# Patient Record
Sex: MALE | Race: WHITE | NOT HISPANIC OR LATINO | URBAN - METROPOLITAN AREA
[De-identification: names, ages, dates, MRNs, and addresses within clinical notes are randomized per-mention and may not be internally consistent; named-entity substitution may affect disease eponyms.]

---

## 2017-02-11 ENCOUNTER — INPATIENT (INPATIENT)
Facility: HOSPITAL | Age: 62
LOS: 3 days | Discharge: HOME | End: 2017-02-15
Attending: INTERNAL MEDICINE

## 2017-03-28 ENCOUNTER — OUTPATIENT (OUTPATIENT)
Dept: OUTPATIENT SERVICES | Facility: HOSPITAL | Age: 62
LOS: 1 days | Discharge: HOME | End: 2017-03-28

## 2017-03-28 ENCOUNTER — APPOINTMENT (OUTPATIENT)
Dept: PULMONOLOGY | Facility: CLINIC | Age: 62
End: 2017-03-28

## 2017-03-28 VITALS — DIASTOLIC BLOOD PRESSURE: 70 MMHG | OXYGEN SATURATION: 94 % | HEART RATE: 100 BPM | SYSTOLIC BLOOD PRESSURE: 140 MMHG

## 2017-03-28 DIAGNOSIS — I10 ESSENTIAL (PRIMARY) HYPERTENSION: ICD-10-CM

## 2017-03-28 DIAGNOSIS — E78.5 HYPERLIPIDEMIA, UNSPECIFIED: ICD-10-CM

## 2017-03-28 DIAGNOSIS — Z86.39 PERSONAL HISTORY OF OTHER ENDOCRINE, NUTRITIONAL AND METABOLIC DISEASE: ICD-10-CM

## 2017-03-28 DIAGNOSIS — I25.10 ATHEROSCLEROTIC HEART DISEASE OF NATIVE CORONARY ARTERY W/OUT ANGINA PECTORIS: ICD-10-CM

## 2017-03-28 DIAGNOSIS — Z95.5 ATHEROSCLEROTIC HEART DISEASE OF NATIVE CORONARY ARTERY W/OUT ANGINA PECTORIS: ICD-10-CM

## 2017-03-28 DIAGNOSIS — F17.200 NICOTINE DEPENDENCE, UNSPECIFIED, UNCOMPLICATED: ICD-10-CM

## 2017-06-27 ENCOUNTER — APPOINTMENT (OUTPATIENT)
Dept: PULMONOLOGY | Facility: CLINIC | Age: 62
End: 2017-06-27

## 2017-06-27 ENCOUNTER — OUTPATIENT (OUTPATIENT)
Dept: OUTPATIENT SERVICES | Facility: HOSPITAL | Age: 62
LOS: 1 days | Discharge: HOME | End: 2017-06-27

## 2017-06-27 VITALS
HEART RATE: 95 BPM | DIASTOLIC BLOOD PRESSURE: 70 MMHG | HEIGHT: 70 IN | BODY MASS INDEX: 26.48 KG/M2 | WEIGHT: 185 LBS | SYSTOLIC BLOOD PRESSURE: 138 MMHG | OXYGEN SATURATION: 96 %

## 2017-06-27 DIAGNOSIS — J18.9 PNEUMONIA, UNSPECIFIED ORGANISM: ICD-10-CM

## 2017-06-27 DIAGNOSIS — R91.8 OTHER NONSPECIFIC ABNORMAL FINDING OF LUNG FIELD: ICD-10-CM

## 2017-06-27 DIAGNOSIS — F17.200 NICOTINE DEPENDENCE, UNSPECIFIED, UNCOMPLICATED: ICD-10-CM

## 2017-06-27 DIAGNOSIS — Z80.1 FAMILY HISTORY OF MALIGNANT NEOPLASM OF TRACHEA, BRONCHUS AND LUNG: ICD-10-CM

## 2017-06-27 DIAGNOSIS — R93.8 ABNORMAL FINDINGS ON DIAGNOSTIC IMAGING OF OTHER SPECIFIED BODY STRUCTURES: ICD-10-CM

## 2017-06-27 DIAGNOSIS — R74.8 ABNORMAL LEVELS OF OTHER SERUM ENZYMES: ICD-10-CM

## 2017-06-27 DIAGNOSIS — Z87.898 PERSONAL HISTORY OF OTHER SPECIFIED CONDITIONS: ICD-10-CM

## 2017-11-24 ENCOUNTER — OUTPATIENT (OUTPATIENT)
Dept: OUTPATIENT SERVICES | Facility: HOSPITAL | Age: 62
LOS: 1 days | Discharge: HOME | End: 2017-11-24

## 2017-11-24 DIAGNOSIS — R74.8 ABNORMAL LEVELS OF OTHER SERUM ENZYMES: ICD-10-CM

## 2017-11-24 DIAGNOSIS — J18.9 PNEUMONIA, UNSPECIFIED ORGANISM: ICD-10-CM

## 2017-11-24 DIAGNOSIS — M54.5 LOW BACK PAIN: ICD-10-CM

## 2018-01-10 ENCOUNTER — INPATIENT (INPATIENT)
Facility: HOSPITAL | Age: 63
LOS: 0 days | Discharge: HOME | End: 2018-01-11
Attending: INTERNAL MEDICINE

## 2018-01-10 DIAGNOSIS — J18.9 PNEUMONIA, UNSPECIFIED ORGANISM: ICD-10-CM

## 2018-01-10 DIAGNOSIS — J11.1 INFLUENZA DUE TO UNIDENTIFIED INFLUENZA VIRUS WITH OTHER RESPIRATORY MANIFESTATIONS: ICD-10-CM

## 2018-01-10 DIAGNOSIS — R74.8 ABNORMAL LEVELS OF OTHER SERUM ENZYMES: ICD-10-CM

## 2018-01-16 DIAGNOSIS — R07.9 CHEST PAIN, UNSPECIFIED: ICD-10-CM

## 2018-01-16 DIAGNOSIS — D64.9 ANEMIA, UNSPECIFIED: ICD-10-CM

## 2018-01-16 DIAGNOSIS — E87.1 HYPO-OSMOLALITY AND HYPONATREMIA: ICD-10-CM

## 2018-01-16 DIAGNOSIS — I25.2 OLD MYOCARDIAL INFARCTION: ICD-10-CM

## 2018-01-16 DIAGNOSIS — M51.36 OTHER INTERVERTEBRAL DISC DEGENERATION, LUMBAR REGION: ICD-10-CM

## 2018-01-16 DIAGNOSIS — E11.9 TYPE 2 DIABETES MELLITUS WITHOUT COMPLICATIONS: ICD-10-CM

## 2018-01-16 DIAGNOSIS — J44.1 CHRONIC OBSTRUCTIVE PULMONARY DISEASE WITH (ACUTE) EXACERBATION: ICD-10-CM

## 2018-01-16 DIAGNOSIS — E78.5 HYPERLIPIDEMIA, UNSPECIFIED: ICD-10-CM

## 2018-01-16 DIAGNOSIS — J44.0 CHRONIC OBSTRUCTIVE PULMONARY DISEASE WITH (ACUTE) LOWER RESPIRATORY INFECTION: ICD-10-CM

## 2018-01-16 DIAGNOSIS — J09.X1 INFLUENZA DUE TO IDENTIFIED NOVEL INFLUENZA A VIRUS WITH PNEUMONIA: ICD-10-CM

## 2018-01-16 DIAGNOSIS — Z95.5 PRESENCE OF CORONARY ANGIOPLASTY IMPLANT AND GRAFT: ICD-10-CM

## 2018-01-16 DIAGNOSIS — I10 ESSENTIAL (PRIMARY) HYPERTENSION: ICD-10-CM

## 2018-01-16 DIAGNOSIS — E78.00 PURE HYPERCHOLESTEROLEMIA, UNSPECIFIED: ICD-10-CM

## 2018-01-16 DIAGNOSIS — E87.3 ALKALOSIS: ICD-10-CM

## 2018-01-16 DIAGNOSIS — F17.210 NICOTINE DEPENDENCE, CIGARETTES, UNCOMPLICATED: ICD-10-CM

## 2018-01-23 ENCOUNTER — APPOINTMENT (OUTPATIENT)
Dept: PULMONOLOGY | Facility: CLINIC | Age: 63
End: 2018-01-23

## 2018-03-06 DIAGNOSIS — R91.8 OTHER NONSPECIFIC ABNORMAL FINDING OF LUNG FIELD: ICD-10-CM

## 2018-03-06 DIAGNOSIS — E11.9 TYPE 2 DIABETES MELLITUS WITHOUT COMPLICATIONS: ICD-10-CM

## 2018-03-06 DIAGNOSIS — J18.9 PNEUMONIA, UNSPECIFIED ORGANISM: ICD-10-CM

## 2018-03-06 DIAGNOSIS — Z91.81 HISTORY OF FALLING: ICD-10-CM

## 2018-03-06 DIAGNOSIS — I25.2 OLD MYOCARDIAL INFARCTION: ICD-10-CM

## 2018-03-06 DIAGNOSIS — A41.9 SEPSIS, UNSPECIFIED ORGANISM: ICD-10-CM

## 2018-03-06 DIAGNOSIS — F17.210 NICOTINE DEPENDENCE, CIGARETTES, UNCOMPLICATED: ICD-10-CM

## 2018-03-06 DIAGNOSIS — I10 ESSENTIAL (PRIMARY) HYPERTENSION: ICD-10-CM

## 2018-03-06 DIAGNOSIS — Z95.5 PRESENCE OF CORONARY ANGIOPLASTY IMPLANT AND GRAFT: ICD-10-CM

## 2018-03-06 DIAGNOSIS — R63.4 ABNORMAL WEIGHT LOSS: ICD-10-CM

## 2018-03-06 DIAGNOSIS — R07.81 PLEURODYNIA: ICD-10-CM

## 2018-03-06 DIAGNOSIS — I25.10 ATHEROSCLEROTIC HEART DISEASE OF NATIVE CORONARY ARTERY WITHOUT ANGINA PECTORIS: ICD-10-CM

## 2018-03-06 DIAGNOSIS — J44.1 CHRONIC OBSTRUCTIVE PULMONARY DISEASE WITH (ACUTE) EXACERBATION: ICD-10-CM

## 2018-03-06 DIAGNOSIS — E87.1 HYPO-OSMOLALITY AND HYPONATREMIA: ICD-10-CM

## 2018-03-12 ENCOUNTER — OUTPATIENT (OUTPATIENT)
Dept: OUTPATIENT SERVICES | Facility: HOSPITAL | Age: 63
LOS: 1 days | Discharge: HOME | End: 2018-03-12

## 2018-03-13 DIAGNOSIS — I10 ESSENTIAL (PRIMARY) HYPERTENSION: ICD-10-CM

## 2018-03-13 DIAGNOSIS — E11.9 TYPE 2 DIABETES MELLITUS WITHOUT COMPLICATIONS: ICD-10-CM

## 2018-03-13 DIAGNOSIS — J44.9 CHRONIC OBSTRUCTIVE PULMONARY DISEASE, UNSPECIFIED: ICD-10-CM

## 2019-10-22 ENCOUNTER — INPATIENT (INPATIENT)
Facility: HOSPITAL | Age: 64
LOS: 6 days | Discharge: HOME HEALTH | End: 2019-10-29
Attending: SURGERY | Admitting: SURGERY
Payer: MEDICARE

## 2019-10-22 VITALS
OXYGEN SATURATION: 97 % | HEART RATE: 95 BPM | RESPIRATION RATE: 18 BRPM | DIASTOLIC BLOOD PRESSURE: 76 MMHG | TEMPERATURE: 96 F | SYSTOLIC BLOOD PRESSURE: 138 MMHG

## 2019-10-22 DIAGNOSIS — Z95.5 PRESENCE OF CORONARY ANGIOPLASTY IMPLANT AND GRAFT: Chronic | ICD-10-CM

## 2019-10-22 LAB
ALBUMIN SERPL ELPH-MCNC: 4.5 G/DL — SIGNIFICANT CHANGE UP (ref 3.5–5.2)
ALP SERPL-CCNC: 85 U/L — SIGNIFICANT CHANGE UP (ref 30–115)
ALT FLD-CCNC: 36 U/L — SIGNIFICANT CHANGE UP (ref 0–41)
ANION GAP SERPL CALC-SCNC: 13 MMOL/L — SIGNIFICANT CHANGE UP (ref 7–14)
APTT BLD: 30.9 SEC — SIGNIFICANT CHANGE UP (ref 27–39.2)
APTT BLD: 92.4 SEC — CRITICAL HIGH (ref 27–39.2)
APTT BLD: 95.9 SEC — CRITICAL HIGH (ref 27–39.2)
AST SERPL-CCNC: 28 U/L — SIGNIFICANT CHANGE UP (ref 0–41)
BASOPHILS # BLD AUTO: 0.09 K/UL — SIGNIFICANT CHANGE UP (ref 0–0.2)
BASOPHILS NFR BLD AUTO: 0.9 % — SIGNIFICANT CHANGE UP (ref 0–1)
BILIRUB SERPL-MCNC: 0.8 MG/DL — SIGNIFICANT CHANGE UP (ref 0.2–1.2)
BUN SERPL-MCNC: 15 MG/DL — SIGNIFICANT CHANGE UP (ref 10–20)
CALCIUM SERPL-MCNC: 9.6 MG/DL — SIGNIFICANT CHANGE UP (ref 8.5–10.1)
CHLORIDE SERPL-SCNC: 94 MMOL/L — LOW (ref 98–110)
CO2 SERPL-SCNC: 29 MMOL/L — SIGNIFICANT CHANGE UP (ref 17–32)
CREAT SERPL-MCNC: 0.7 MG/DL — SIGNIFICANT CHANGE UP (ref 0.7–1.5)
EOSINOPHIL # BLD AUTO: 0.05 K/UL — SIGNIFICANT CHANGE UP (ref 0–0.7)
EOSINOPHIL NFR BLD AUTO: 0.5 % — SIGNIFICANT CHANGE UP (ref 0–8)
GLUCOSE BLDC GLUCOMTR-MCNC: 235 MG/DL — HIGH (ref 70–99)
GLUCOSE BLDC GLUCOMTR-MCNC: 284 MG/DL — HIGH (ref 70–99)
GLUCOSE SERPL-MCNC: 266 MG/DL — HIGH (ref 70–99)
HCT VFR BLD CALC: 41.6 % — LOW (ref 42–52)
HGB BLD-MCNC: 13 G/DL — LOW (ref 14–18)
IMM GRANULOCYTES NFR BLD AUTO: 0.2 % — SIGNIFICANT CHANGE UP (ref 0.1–0.3)
INR BLD: 1.12 RATIO — SIGNIFICANT CHANGE UP (ref 0.65–1.3)
LACTATE SERPL-SCNC: 2.1 MMOL/L — SIGNIFICANT CHANGE UP (ref 0.5–2.2)
LYMPHOCYTES # BLD AUTO: 2.13 K/UL — SIGNIFICANT CHANGE UP (ref 1.2–3.4)
LYMPHOCYTES # BLD AUTO: 22.1 % — SIGNIFICANT CHANGE UP (ref 20.5–51.1)
MCHC RBC-ENTMCNC: 19.9 PG — LOW (ref 27–31)
MCHC RBC-ENTMCNC: 31.3 G/DL — LOW (ref 32–37)
MCV RBC AUTO: 63.7 FL — LOW (ref 80–94)
MONOCYTES # BLD AUTO: 1.13 K/UL — HIGH (ref 0.1–0.6)
MONOCYTES NFR BLD AUTO: 11.7 % — HIGH (ref 1.7–9.3)
NEUTROPHILS # BLD AUTO: 6.22 K/UL — SIGNIFICANT CHANGE UP (ref 1.4–6.5)
NEUTROPHILS NFR BLD AUTO: 64.6 % — SIGNIFICANT CHANGE UP (ref 42.2–75.2)
NRBC # BLD: 0 /100 WBCS — SIGNIFICANT CHANGE UP (ref 0–0)
PLATELET # BLD AUTO: 180 K/UL — SIGNIFICANT CHANGE UP (ref 130–400)
POTASSIUM SERPL-MCNC: 4 MMOL/L — SIGNIFICANT CHANGE UP (ref 3.5–5)
POTASSIUM SERPL-SCNC: 4 MMOL/L — SIGNIFICANT CHANGE UP (ref 3.5–5)
PROT SERPL-MCNC: 7.1 G/DL — SIGNIFICANT CHANGE UP (ref 6–8)
PROTHROM AB SERPL-ACNC: 12.9 SEC — HIGH (ref 9.95–12.87)
RBC # BLD: 6.53 M/UL — HIGH (ref 4.7–6.1)
RBC # FLD: 15.1 % — HIGH (ref 11.5–14.5)
SODIUM SERPL-SCNC: 136 MMOL/L — SIGNIFICANT CHANGE UP (ref 135–146)
WBC # BLD: 9.64 K/UL — SIGNIFICANT CHANGE UP (ref 4.8–10.8)
WBC # FLD AUTO: 9.64 K/UL — SIGNIFICANT CHANGE UP (ref 4.8–10.8)

## 2019-10-22 PROCEDURE — 93971 EXTREMITY STUDY: CPT | Mod: 26,LT

## 2019-10-22 PROCEDURE — 93880 EXTRACRANIAL BILAT STUDY: CPT | Mod: 26

## 2019-10-22 PROCEDURE — 93306 TTE W/DOPPLER COMPLETE: CPT | Mod: 26

## 2019-10-22 PROCEDURE — 93926 LOWER EXTREMITY STUDY: CPT | Mod: 26,LT

## 2019-10-22 PROCEDURE — 75635 CT ANGIO ABDOMINAL ARTERIES: CPT | Mod: 26

## 2019-10-22 PROCEDURE — 99285 EMERGENCY DEPT VISIT HI MDM: CPT

## 2019-10-22 PROCEDURE — 93010 ELECTROCARDIOGRAM REPORT: CPT

## 2019-10-22 PROCEDURE — 99222 1ST HOSP IP/OBS MODERATE 55: CPT

## 2019-10-22 RX ORDER — HEPARIN SODIUM 5000 [USP'U]/ML
INJECTION INTRAVENOUS; SUBCUTANEOUS
Qty: 25000 | Refills: 0 | Status: DISCONTINUED | OUTPATIENT
Start: 2019-10-22 | End: 2019-10-22

## 2019-10-22 RX ORDER — DEXTROSE 50 % IN WATER 50 %
25 SYRINGE (ML) INTRAVENOUS ONCE
Refills: 0 | Status: DISCONTINUED | OUTPATIENT
Start: 2019-10-22 | End: 2019-10-25

## 2019-10-22 RX ORDER — HEPARIN SODIUM 5000 [USP'U]/ML
INJECTION INTRAVENOUS; SUBCUTANEOUS
Qty: 25000 | Refills: 0 | Status: DISCONTINUED | OUTPATIENT
Start: 2019-10-22 | End: 2019-10-23

## 2019-10-22 RX ORDER — HEPARIN SODIUM 5000 [USP'U]/ML
3000 INJECTION INTRAVENOUS; SUBCUTANEOUS EVERY 6 HOURS
Refills: 0 | Status: DISCONTINUED | OUTPATIENT
Start: 2019-10-22 | End: 2019-10-25

## 2019-10-22 RX ORDER — ATORVASTATIN CALCIUM 80 MG/1
80 TABLET, FILM COATED ORAL AT BEDTIME
Refills: 0 | Status: DISCONTINUED | OUTPATIENT
Start: 2019-10-22 | End: 2019-10-25

## 2019-10-22 RX ORDER — GLUCAGON INJECTION, SOLUTION 0.5 MG/.1ML
1 INJECTION, SOLUTION SUBCUTANEOUS ONCE
Refills: 0 | Status: DISCONTINUED | OUTPATIENT
Start: 2019-10-22 | End: 2019-10-25

## 2019-10-22 RX ORDER — ASPIRIN/CALCIUM CARB/MAGNESIUM 324 MG
81 TABLET ORAL DAILY
Refills: 0 | Status: DISCONTINUED | OUTPATIENT
Start: 2019-10-22 | End: 2019-10-25

## 2019-10-22 RX ORDER — HEPARIN SODIUM 5000 [USP'U]/ML
6500 INJECTION INTRAVENOUS; SUBCUTANEOUS EVERY 6 HOURS
Refills: 0 | Status: DISCONTINUED | OUTPATIENT
Start: 2019-10-22 | End: 2019-10-25

## 2019-10-22 RX ORDER — METOPROLOL TARTRATE 50 MG
50 TABLET ORAL
Refills: 0 | Status: DISCONTINUED | OUTPATIENT
Start: 2019-10-22 | End: 2019-10-25

## 2019-10-22 RX ORDER — DEXTROSE 50 % IN WATER 50 %
12.5 SYRINGE (ML) INTRAVENOUS ONCE
Refills: 0 | Status: DISCONTINUED | OUTPATIENT
Start: 2019-10-22 | End: 2019-10-25

## 2019-10-22 RX ORDER — CHLORHEXIDINE GLUCONATE 213 G/1000ML
1 SOLUTION TOPICAL
Refills: 0 | Status: DISCONTINUED | OUTPATIENT
Start: 2019-10-22 | End: 2019-10-25

## 2019-10-22 RX ORDER — HEPARIN SODIUM 5000 [USP'U]/ML
6500 INJECTION INTRAVENOUS; SUBCUTANEOUS ONCE
Refills: 0 | Status: COMPLETED | OUTPATIENT
Start: 2019-10-22 | End: 2019-10-22

## 2019-10-22 RX ORDER — INSULIN LISPRO 100/ML
6 VIAL (ML) SUBCUTANEOUS
Refills: 0 | Status: DISCONTINUED | OUTPATIENT
Start: 2019-10-22 | End: 2019-10-25

## 2019-10-22 RX ORDER — LISINOPRIL 2.5 MG/1
20 TABLET ORAL DAILY
Refills: 0 | Status: DISCONTINUED | OUTPATIENT
Start: 2019-10-22 | End: 2019-10-25

## 2019-10-22 RX ORDER — ATORVASTATIN CALCIUM 80 MG/1
20 TABLET, FILM COATED ORAL AT BEDTIME
Refills: 0 | Status: DISCONTINUED | OUTPATIENT
Start: 2019-10-22 | End: 2019-10-22

## 2019-10-22 RX ORDER — SODIUM CHLORIDE 9 MG/ML
1000 INJECTION, SOLUTION INTRAVENOUS
Refills: 0 | Status: DISCONTINUED | OUTPATIENT
Start: 2019-10-22 | End: 2019-10-25

## 2019-10-22 RX ORDER — INSULIN LISPRO 100/ML
VIAL (ML) SUBCUTANEOUS
Refills: 0 | Status: DISCONTINUED | OUTPATIENT
Start: 2019-10-22 | End: 2019-10-25

## 2019-10-22 RX ORDER — DEXTROSE 50 % IN WATER 50 %
15 SYRINGE (ML) INTRAVENOUS ONCE
Refills: 0 | Status: DISCONTINUED | OUTPATIENT
Start: 2019-10-22 | End: 2019-10-25

## 2019-10-22 RX ORDER — INSULIN GLARGINE 100 [IU]/ML
18 INJECTION, SOLUTION SUBCUTANEOUS AT BEDTIME
Refills: 0 | Status: DISCONTINUED | OUTPATIENT
Start: 2019-10-22 | End: 2019-10-25

## 2019-10-22 RX ADMIN — HEPARIN SODIUM 1500 UNIT(S)/HR: 5000 INJECTION INTRAVENOUS; SUBCUTANEOUS at 12:15

## 2019-10-22 RX ADMIN — HEPARIN SODIUM 6500 UNIT(S): 5000 INJECTION INTRAVENOUS; SUBCUTANEOUS at 12:10

## 2019-10-22 RX ADMIN — ATORVASTATIN CALCIUM 80 MILLIGRAM(S): 80 TABLET, FILM COATED ORAL at 21:39

## 2019-10-22 RX ADMIN — INSULIN GLARGINE 18 UNIT(S): 100 INJECTION, SOLUTION SUBCUTANEOUS at 21:40

## 2019-10-22 NOTE — ED PROVIDER NOTE - NS ED ROS FT
Constitutional: See HPI.  Eyes: No visual changes, eye pain or discharge.  ENMT: No hearing changes, pain, discharge or infections. No neck pain or stiffness.  Cardiac: No chest pain, SOB or edema. No chest pain with exertion.  Respiratory: No cough or respiratory distress.   GI: No nausea, vomiting, diarrhea or abdominal pain.  : No dysuria, frequency or burning.  MS: +left foot numbness and pain; No myalgia, muscle weakness, joint pain or back pain.  Neuro: No headache or weakness. No LOC.  Skin: No skin rash.  Endo: + hx of DM, No thyroid disease  Except as documented in HPI, all other review of systems is negative

## 2019-10-22 NOTE — CONSULT NOTE ADULT - ASSESSMENT
Left lower extremity arterial insufficiency.        Duplex demonstrates occlusion of left superficial femoral artery with diminished flow distally and high grade stenosis of left profunda femoris.     CAD - hx of stents without recurrence of angina.  Pt has been non compliant with medicaitons such as ASA, statins.     Hx of DM,     severe COPD.    - He is on heparin drip   - Carotid duplex     -If pt requires urgent or emergent vascular procedure (endovascular or surgical) for limb salvage may proceed without further cardiac testing at present time and is at elevated risk.  Would benefit from ASA, Statins.   If surgery is elective will consider further testing.  Echo for LV and valve function.  EKG pre- and post procedure.

## 2019-10-22 NOTE — ED PROVIDER NOTE - OBJECTIVE STATEMENT
63 y/o male with pmhx of CAD s/p 2 stents, DM, DLD, HTN presents with left foot numbness. Patient states he has been having these symptoms over the past 4 days, admits to left toes numbness with pain to plantar surface when walking and erythema over toes. Patient denies headache, vision changes, dizziness, sob, chest pain, n/v/d, abdominal pain, weakness, inability to ambulate. + smoker. Admits to having appt with podiatrist on 10/24.

## 2019-10-22 NOTE — CONSULT NOTE ADULT - ASSESSMENT
65 y/o male with pmhx of CAD s/p 2 stents, DM, DLD, HTN presents with worsening left foot numbness and discolouration for the past 3-4 days. He has not had these symptoms before. Pain is intermittent, described as sharp and located on the lateral plantar surface. It is associated with a discolouration of the digits, numbness, and tingling. On exam, left lower extremity pulses are dopplerable but not palpable. Right sided pulses are palpable.     Plan:   - discuss with attending 63 y/o male with pmhx of CAD s/p 2 stents, DM, DLD, HTN presents with worsening left foot numbness and discolouration for the past 3-4 days. He has not had these symptoms before. Pain is intermittent, described as sharp and located on the lateral plantar surface. It is associated with a discolouration of the digits, numbness, and tingling. On exam, left lower extremity pulses are dopplerable but not palpable. Right sided pulses are palpable. Duplex demonstrates occlusion of left superficial femoral artery with diminished flow distally and high grade stenosis of left profunda femoris.     Plan:   - CTA abdomen pelvis with lower extremity run off   - heparin drip   - Carotid duplex   - cardiology   - recommend admission to medicine 63 y/o male with pmhx of CAD s/p 2 stents, DM, DLD, HTN presents with worsening left foot numbness, discolouration, and pain for the past 3-4 days. He has not had these symptoms before. Pain is intermittent, described as sharp and located on the lateral plantar surface. It is associated with a discolouration of the digits, numbness, and tingling. On exam, left lower extremity pulses are dopplerable but not palpable. Right sided pulses are palpable. Duplex demonstrates occlusion of left superficial femoral artery with diminished flow distally and high grade stenosis of left profunda femoris.     Plan:   - CTA abdomen pelvis with lower extremity run off   - heparin drip   - Carotid duplex   - cardiology   - recommend admission to medicine

## 2019-10-22 NOTE — H&P ADULT - NSHPLABSRESULTS_GEN_ALL_CORE
< from: [facility] Duplex Lower Ext Vein Scan, Left (10.22.19 @ 09:10) >    Impression:    No evidence of deep venous thrombosis or superficial thrombophlebitis in   left lower extremity.      < end of copied text >      < from: [facility] Duplex Low Ext Arterial, Ltd, Left (10.22.19 @ 09:08) >    Impression:    Acute occlusion of left superficial femoral artery with severely   diminished flow distally. High-grade stenosis left profunda femoris   artery.      < end of copied text >

## 2019-10-22 NOTE — H&P ADULT - HISTORY OF PRESENT ILLNESS
63 y/o male with pmhx of CAD s/p 2 stents, DM, DLD, HTN presents with worsening left foot numbness, discolouration, and pain for the past 3-4 days. He has not had these symptoms before. Pain is intermittent, described as sharp and located on the lateral plantar surface. It is associated with a discolouration of the digits, numbness, and tingling. The pain is exacerbated by walking and nothing relives it. There is no radiation of pain/numbess. No pain in hip, thighs, or calfs with walking. Currently the patient has no chest pain, shortness of breath, nausea, vomiting, change in bowel habit, or abdominal pain. He has not seen a vascular surgeon in the past. Patient is a current smoker with a 30 pack year history. No family history of thrombosis. 65 y/o male with pmhx of CAD s/p PCI 10yrs ago, DM, DLD, HTN presents with worsening left foot numbness, reddish discoloration, coldness and pain for the past 2 weeks which has been worsening in the last 4days. Pain is intermittent, described as sharp and located on the lateral plantar surface, exacerbated by walking and nothing relieves it. There is no radiation of pain, weakness in lower extremities. No pain in hip, thighs, or calves with walking. No c/o chest pain, shortness of breath, nausea, vomiting, change in bowel habit, or abdominal pain.  No similar complaints in the past. He has not seen a vascular surgeon in the past. Patient is a current smoker with a 40 pack year history.   In ED, /76mm Hg, HR 95, Afebrile, Saturating at 97% on room air. Vascular arterial duplex showed Acute occlusion of left superficial femoral artery with severely diminished flow distally. High-grade stenosis left profunda femoris artery.

## 2019-10-22 NOTE — CONSULT NOTE ADULT - SUBJECTIVE AND OBJECTIVE BOX
Date of Admission:    Evaluated by Dr. Torsten Reyes MD contact no. [phone]    CHIEF COMPLAINT:  Left lower extremity numbness and cold.    HISTORY OF PRESENT ILLNESS:     63 y/o male with hx of CAD s/p PCI 10yrs ago, DM, DLD, HTN presents with worsening left foot numbness, reddish discoloration, coldness and pain for the past 2 weeks which has been worsening in the last 4days. Pain is intermittent, described as sharp and located on the lateral plantar surface, exacerbated by walking and nothing relieves it. There is no radiation of pain, weakness in lower extremities. No pain in hip, thighs, or calves with walking. No c/o chest pain, shortness of breath, nausea, vomiting, change in bowel habit, or abdominal pain.  No similar complaints in the past. He has not seen a vascular surgeon in the past. Patient is a current smoker with a 40 pack year history.   In ED, /76mm Hg, HR 95, Afebrile, Saturating at 97% on room air. Vascular arterial duplex showed Acute occlusion of left superficial femoral artery with severely diminished flow distally. High-grade stenosis left profunda femoris artery. (22 Oct 2019 13:36)  He stopped all cardiac medications because of side effects.  He does not take ASA, STatins.      PAST MEDICAL & SURGICAL HISTORY:  CAD (coronary artery disease)  Dyslipidemia  Diabetes  HTN (hypertension)  History of coronary artery stent placement: x 2 stents      FAMILY HISTORY:    Allergies    No Known Allergies    Intolerances    	  Home Medications:  atorvastatin 20 mg oral tablet: 1 tab(s) orally once a day (22 Oct 2019 14:06)  lisinopril 20 mg oral tablet: 1 tab(s) orally once a day (22 Oct 2019 14:06)  metFORMIN 850 mg oral tablet: orally every 12 hours (22 Oct 2019 14:06)  metoprolol: 50 milligram(s) orally 2 times a day (22 Oct 2019 14:06)    MEDICATIONS  (STANDING):  aspirin enteric coated 81 milliGRAM(s) Oral daily  atorvastatin 80 milliGRAM(s) Oral at bedtime  chlorhexidine 4% Liquid 1 Application(s) Topical <User Schedule>  dextrose 5%. 1000 milliLiter(s) (50 mL/Hr) IV Continuous <Continuous>  dextrose 50% Injectable 12.5 Gram(s) IV Push once  dextrose 50% Injectable 25 Gram(s) IV Push once  dextrose 50% Injectable 25 Gram(s) IV Push once  heparin  Infusion.  Unit(s)/Hr (15 mL/Hr) IV Continuous <Continuous>  insulin glargine Injectable (LANTUS) 18 Unit(s) SubCutaneous at bedtime  insulin lispro (HumaLOG) corrective regimen sliding scale   SubCutaneous three times a day before meals  insulin lispro Injectable (HumaLOG) 6 Unit(s) SubCutaneous three times a day before meals  lisinopril 20 milliGRAM(s) Oral daily  metoprolol tartrate 50 milliGRAM(s) Oral two times a day    MEDICATIONS  (PRN):  dextrose 40% Gel 15 Gram(s) Oral once PRN Blood Glucose LESS THAN 70 milliGRAM(s)/deciliter  glucagon  Injectable 1 milliGRAM(s) IntraMuscular once PRN Glucose LESS THAN 70 milligrams/deciliter  heparin  Injectable 6500 Unit(s) IV Push every 6 hours PRN For aPTT less than 40  heparin  Injectable 3000 Unit(s) IV Push every 6 hours PRN For aPTT between 40 - 57      SOCIAL HISTORY:      [x] current Smoker  [ ] Alcohol    REVIEW OF SYSTEMS:  CONSTITUTIONAL: No fever, chills, or fatigue  CARDIOLOGY: Patient denies chest pain, shortness of breath, palpitations or syncopal episodes.   RESPIRATORY: denies shortness of breath, wheezing.   NEUROLOGICAL: Generalized weakness, no focal deficits to report.  ENDOCRINOLOGICAL: no recent change in diabetic medications.   GI: no melena/hematochezia, no Nausea, Vomiting, diarrhea.    PSYCHIATRY: normal mood and affect  HEENT: no epistaxis, headaches.  SKIN: no ecchymosis, no lesions.  MUSCULOSKELETAL: he does not walk much due to back pain.    PHYSICAL EXAM:  T(C): 36.4 (10-22-19 @ 15:51), Max: 36.4 (10-22-19 @ 15:51)  HR: 90 (10-22-19 @ 15:51) (83 - 95)  BP: 133/76 (10-22-19 @ 15:51) (132/74 - 138/76)  RR: 18 (10-22-19 @ 15:51) (16 - 18)  SpO2: 97% (10-22-19 @ 15:51) (97% - 98%)  Wt(kg): --  I&O's Summary    Daily Height in cm: 177.8 (22 Oct 2019 10:29)    Daily     General Appearance: Well developed, M in no distress	  Cardiovascular:  S1 S2 soft intensity, No JVD, No murmurs or gallops.  No peripheral edema  Respiratory: Lungs with severely diminished air entry, diffuse wheezing.  No rales.  Psychiatry: A & O x 3, Mood & affect appropriate  Gastrointestinal:  Soft, Non-tender, no bruits.  Skin: No rashes, No ecchymoses, No cyanosis	  Neurologic: No focal motor deficits.  Extremities: No clubbing, cyanosis or edema  Left foot cool but not cold.    Vascular: Peripheral pulses palpable 2+ on right.  Absent pulses popliteal, DP, PT    LABS:	 	                          13.0   9.64  )-----------( 180      ( 22 Oct 2019 07:52 )             41.6     10-22    136  |  94<L>  |  15  ----------------------------<  266<H>  4.0   |  29  |  0.7    Ca    9.6      22 Oct 2019 07:52    TPro  7.1  /  Alb  4.5  /  TBili  0.8  /  DBili  x   /  AST  28  /  ALT  36  /  AlkPhos  85  10-22        PT/INR - ( 22 Oct 2019 07:52 )   PT: 12.90 sec;   INR: 1.12 ratio         PTT - ( 22 Oct 2019 16:13 )  PTT:95.9 sec     	    PREVIOUS DIAGNOSTIC TESTING:    [ ] Echocardiogram:  [ ]  Catheterization:  [ ] Stress Test:  	  	  ASSESSMENT/PLAN: Date of Admission:    Evaluated by Dr. Torsten Reyes MD contact no. [phone]    CHIEF COMPLAINT:  Left lower extremity numbness and cold.    HISTORY OF PRESENT ILLNESS:     63 y/o male with hx of CAD s/p PCI 10yrs ago, DM, DLD, HTN presents with worsening left foot numbness, reddish discoloration, coldness and pain for the past 2 weeks which has been worsening in the last 4days. Pain is intermittent, described as sharp and located on the lateral plantar surface, exacerbated by walking and nothing relieves it. There is no radiation of pain, weakness in lower extremities. No pain in hip, thighs, or calves with walking. No c/o chest pain, shortness of breath, nausea, vomiting, change in bowel habit, or abdominal pain.  No similar complaints in the past. He has not seen a vascular surgeon in the past. Patient is a current smoker with a 40 pack year history.   In ED, /76mm Hg, HR 95, Afebrile, Saturating at 97% on room air. Vascular arterial duplex showed Acute occlusion of left superficial femoral artery with severely diminished flow distally. High-grade stenosis left profunda femoris artery. (22 Oct 2019 13:36)  He stopped all cardiac medications because of side effects.  He does not take ASA, STatins.      PAST MEDICAL & SURGICAL HISTORY:  CAD (coronary artery disease)  Dyslipidemia  Diabetes  HTN (hypertension)  History of coronary artery stent placement: x 2 stents      FAMILY HISTORY:    Allergies    No Known Allergies    Intolerances    	  Home Medications:  atorvastatin 20 mg oral tablet: 1 tab(s) orally once a day (22 Oct 2019 14:06)  lisinopril 20 mg oral tablet: 1 tab(s) orally once a day (22 Oct 2019 14:06)  metFORMIN 850 mg oral tablet: orally every 12 hours (22 Oct 2019 14:06)  metoprolol: 50 milligram(s) orally 2 times a day (22 Oct 2019 14:06)    MEDICATIONS  (STANDING):  aspirin enteric coated 81 milliGRAM(s) Oral daily  atorvastatin 80 milliGRAM(s) Oral at bedtime  chlorhexidine 4% Liquid 1 Application(s) Topical <User Schedule>  dextrose 5%. 1000 milliLiter(s) (50 mL/Hr) IV Continuous <Continuous>  dextrose 50% Injectable 12.5 Gram(s) IV Push once  dextrose 50% Injectable 25 Gram(s) IV Push once  dextrose 50% Injectable 25 Gram(s) IV Push once  heparin  Infusion.  Unit(s)/Hr (15 mL/Hr) IV Continuous <Continuous>  insulin glargine Injectable (LANTUS) 18 Unit(s) SubCutaneous at bedtime  insulin lispro (HumaLOG) corrective regimen sliding scale   SubCutaneous three times a day before meals  insulin lispro Injectable (HumaLOG) 6 Unit(s) SubCutaneous three times a day before meals  lisinopril 20 milliGRAM(s) Oral daily  metoprolol tartrate 50 milliGRAM(s) Oral two times a day    MEDICATIONS  (PRN):  dextrose 40% Gel 15 Gram(s) Oral once PRN Blood Glucose LESS THAN 70 milliGRAM(s)/deciliter  glucagon  Injectable 1 milliGRAM(s) IntraMuscular once PRN Glucose LESS THAN 70 milligrams/deciliter  heparin  Injectable 6500 Unit(s) IV Push every 6 hours PRN For aPTT less than 40  heparin  Injectable 3000 Unit(s) IV Push every 6 hours PRN For aPTT between 40 - 57      SOCIAL HISTORY:      [x] current Smoker  [ ] Alcohol    REVIEW OF SYSTEMS:  CONSTITUTIONAL: No fever, chills, or fatigue  CARDIOLOGY: Patient denies chest pain, shortness of breath, palpitations or syncopal episodes.   RESPIRATORY: denies shortness of breath, wheezing.   NEUROLOGICAL: Generalized weakness, no focal deficits to report.  ENDOCRINOLOGICAL: no recent change in diabetic medications.   GI: no melena/hematochezia, no Nausea, Vomiting, diarrhea.    PSYCHIATRY: normal mood and affect  HEENT: no epistaxis, headaches.  SKIN: no ecchymosis, no lesions.  MUSCULOSKELETAL: he does not walk much due to back pain.    PHYSICAL EXAM:  T(C): 36.4 (10-22-19 @ 15:51), Max: 36.4 (10-22-19 @ 15:51)  HR: 90 (10-22-19 @ 15:51) (83 - 95)  BP: 133/76 (10-22-19 @ 15:51) (132/74 - 138/76)  RR: 18 (10-22-19 @ 15:51) (16 - 18)  SpO2: 97% (10-22-19 @ 15:51) (97% - 98%)  Wt(kg): --  I&O's Summary    Daily Height in cm: 177.8 (22 Oct 2019 10:29)    Daily     General Appearance: Well developed, M in no distress	  Cardiovascular:  S1 S2 soft intensity, No JVD, No murmurs or gallops.  No peripheral edema  Respiratory: Lungs with severely diminished air entry, diffuse wheezing.  No rales.  Psychiatry: A & O x 3, Mood & affect appropriate  Gastrointestinal:  Soft, Non-tender, no bruits.  Skin: No rashes, No ecchymoses, No cyanosis	  Neurologic: No focal motor deficits.  Extremities: No clubbing, cyanosis or edema  Left foot cool but not cold.    Vascular: Peripheral pulses palpable 2+ on right.  Absent pulses popliteal, DP, PT    LABS:	 	                          13.0   9.64  )-----------( 180      ( 22 Oct 2019 07:52 )             41.6     10-22    136  |  94<L>  |  15  ----------------------------<  266<H>  4.0   |  29  |  0.7    Ca    9.6      22 Oct 2019 07:52    TPro  7.1  /  Alb  4.5  /  TBili  0.8  /  DBili  x   /  AST  28  /  ALT  36  /  AlkPhos  85  10-22        PT/INR - ( 22 Oct 2019 07:52 )   PT: 12.90 sec;   INR: 1.12 ratio         PTT - ( 22 Oct 2019 16:13 )  PTT:95.9 sec     	    PREVIOUS DIAGNOSTIC TESTING:    [x ] Echocardiogram: normal LV systolic function, LVH with diastolic dysfunction. No sig valve lesions.  [ ]  Catheterization:  [ ] Stress Test:  	  	  ASSESSMENT/PLAN:

## 2019-10-22 NOTE — CONSULT NOTE ADULT - ATTENDING COMMENTS
64 year old with CAD and 3 days of left foot pain and numbness    Pt denies prior history of PVD. There are no ulcerations. On exam he has doppler signals only on the affected limb. Dupex shows proximal SFA and profunda occlusion with collateral flow. This is suggestive of embolic vs thrombotic event. Will obtain CTA for eval.

## 2019-10-22 NOTE — ED ADULT NURSE NOTE - CHPI ED NUR SYMPTOMS NEG
no nausea/no weakness/no change in level of consciousness/no vomiting/no fever/no dizziness/no loss of consciousness/no blurred vision/no confusion

## 2019-10-22 NOTE — ED PROVIDER NOTE - PHYSICAL EXAMINATION
CONSTITUTIONAL: Well-developed; well-nourished; in no acute distress.   SKIN: warm, dry  HEAD: Normocephalic; atraumatic.  EYES: no conj injection  ENT: No nasal discharge; airway clear.  NECK: Supple; non tender.  CARD: S1, S2 normal; no murmurs, gallops, or rubs. Regular rate and rhythm.   RESP: No wheezes, rales or rhonchi.  ABD: soft ntnd  EXT: +left calf tenderness, no swelling or erythema; Normal ROM.  No clubbing, cyanosis or edema.   NEURO: Alert, oriented, grossly unremarkable; 5/5 motor strength left foot; + decreased sensation over dorsal aspect of left 2nd-5th toes; + dopplerable DP and PT left foot; +erythema over left toes; gait normal  PSYCH: Cooperative, appropriate.

## 2019-10-22 NOTE — ED PROVIDER NOTE - CLINICAL SUMMARY MEDICAL DECISION MAKING FREE TEXT BOX
Labs ok, duplex with acute L femoral arterial thrombus. Vascular consulted, rec CTA, carotid duplex, heparin, admit.

## 2019-10-22 NOTE — H&P ADULT - ASSESSMENT
65 y/o male with pmhx of CAD s/p PCI 10yrs ago, DM, DLD, HTN presents with worsening left foot numbness, reddish discoloration, coldness and pain for the past 2 weeks which has been worsening in the last 4days. In ED, vascular arterial duplex showed acute occlusion of left superficial femoral artery with severely diminished flow distally. High-grade stenosis left profunda femoris artery.     ASSESSMENT AND PLAN:    #Peripheral vascular disease Embolic vs thrombotic  -Presented with left lower extremity numbness, coldness   -Pulses not palpable on right side, but present on doppler at bed side as per vascular  -Arterial duplex showed Occlusion of left SFA and profunda femoris on left side with collaterals  -Started the patient on heparin drip. f/u PTT 4pm, 8pm, am. Goal PTT 60-90  -Vascular following  -Ordered CT angio abdomen & pelvis with lower extremity run off  -Occlusion can be embolic vs thrombotic from atherosclerosis considering the history of CAD, chronic smoking  -Carotid duplex, ECHO w/ bubble study ordered  -Started on aspirin and high dose statin    #Diabetes Mellitus  -Started on insulin glargine and lispro  -Monitor FS and adjust the dose accordingly    #Hypertension  -c/w metoprolol and lisinopril    #DLD  -c/w statin    #Diet: NPO currently for possible OR after CT angio  #DVT ppx: heparin gtt  #GI ppx: not indicated  #Dispo: from home. Acute

## 2019-10-22 NOTE — CONSULT NOTE ADULT - SUBJECTIVE AND OBJECTIVE BOX
PORFIRIO HERRERA [id number]  64y Male      HPI:  65 y/o male with pmhx of CAD s/p 2 stents, DM, DLD, HTN presents with worsening left foot numbness and discolouration for the past 3-4 days. He has not had these symptoms before. Pain is intermittent, described as sharp and located on the lateral plantar surface. It is associated with a discolouration of the digits, numbness, and tingling. The pain is exacerbated by walking and nothing relives it. There is no radiation of pain/numbess. Currently the patient has no chest pain, shortenss of breath, nausea, vomiting, change in bowel habit, or abdominal pain. He has not seen a vascular surgeon in the past. Patient is a current smoker with a 30 pack year history.     PAST MEDICAL & SURGICAL HISTORY:  CAD (coronary artery disease)  Dyslipidemia  Diabetes  HTN (hypertension)  History of coronary artery stent placement: x 2 stents        MEDICATIONS  (STANDING):    MEDICATIONS  (PRN):      Allergies    No Known Allergies    Intolerances        REVIEW OF SYSTEMS    [x A ten-point review of systems was otherwise negative except as noted.  [ ] Due to altered mental status/intubation, subjective information were not able to be obtained from the patient. History was obtained, to the extent possible, from review of the chart and collateral sources of information.      Vital Signs Last 24 Hrs  T(C): 35.8 (22 Oct 2019 07:25), Max: 35.8 (22 Oct 2019 07:25)  T(F): 96.4 (22 Oct 2019 07:25), Max: 96.4 (22 Oct 2019 07:25)  HR: 95 (22 Oct 2019 07:25) (95 - 95)  BP: 138/76 (22 Oct 2019 07:25) (138/76 - 138/76)  BP(mean): --  RR: 18 (22 Oct 2019 07:25) (18 - 18)  SpO2: 97% (22 Oct 2019 07:25) (97% - 97%)    PHYSICAL EXAM:  GENERAL: NAD, well-appearing  CHEST/LUNG: Clear to auscultation bilaterally  HEART: Regular rate and rhythm  ABDOMEN: Soft, Nontender, Nondistended;   EXTREMITIES: Left foot erythematous, b/l feet are cool but equal in temperature, good range of motion b/l, sensation is reduced on left  Pulses:   DP - right palpable, left dopplerable   PT - right palpable, left dopplerable   Popliteal: dopplerable b/l     LABS:  Labs:  CAPILLARY BLOOD GLUCOSE                              13.0   9.64  )-----------( 180      ( 22 Oct 2019 07:52 )             41.6       Auto Neutrophil %: 64.6 % (10-22-19 @ 07:52)  Auto Immature Granulocyte %: 0.2 % (10-22-19 @ 07:52)    10-22    136  |  94<L>  |  15  ----------------------------<  266<H>  4.0   |  29  |  0.7      Calcium, Total Serum: 9.6 mg/dL (10-22-19 @ 07:52)      LFTs:             7.1  | 0.8  | 28       ------------------[85      ( 22 Oct 2019 07:52 )  4.5  | x    | 36          Lipase:x      Amylase:x         Lactate, Blood: 2.1 mmol/L (10-22-19 @ 07:52)      Coags:     12.90  ----< 1.12    ( 22 Oct 2019 07:52 )     30.9          RADIOLOGY & ADDITIONAL STUDIES:  f/u read of duplex scans PORFIRIO HERRERA [id number]  64y Male      HPI:  65 y/o male with pmhx of CAD s/p 2 stents, DM, DLD, HTN presents with worsening left foot numbness and discolouration for the past 3-4 days. He has not had these symptoms before. Pain is intermittent, described as sharp and located on the lateral plantar surface. It is associated with a discolouration of the digits, numbness, and tingling. The pain is exacerbated by walking and nothing relives it. There is no radiation of pain/numbess. No pain in hip, thighs, or calfs with walking. Currently the patient has no chest pain, shortness of breath, nausea, vomiting, change in bowel habit, or abdominal pain. He has not seen a vascular surgeon in the past. Patient is a current smoker with a 30 pack year history. No family history of thrombosis.     PAST MEDICAL & SURGICAL HISTORY:  CAD (coronary artery disease)  Dyslipidemia  Diabetes  HTN (hypertension)  History of coronary artery stent placement: x 2 stents        MEDICATIONS  (STANDING):    MEDICATIONS  (PRN):      Allergies    No Known Allergies    Intolerances        REVIEW OF SYSTEMS    [x A ten-point review of systems was otherwise negative except as noted.  [ ] Due to altered mental status/intubation, subjective information were not able to be obtained from the patient. History was obtained, to the extent possible, from review of the chart and collateral sources of information.      Vital Signs Last 24 Hrs  T(C): 35.8 (22 Oct 2019 07:25), Max: 35.8 (22 Oct 2019 07:25)  T(F): 96.4 (22 Oct 2019 07:25), Max: 96.4 (22 Oct 2019 07:25)  HR: 95 (22 Oct 2019 07:25) (95 - 95)  BP: 138/76 (22 Oct 2019 07:25) (138/76 - 138/76)  BP(mean): --  RR: 18 (22 Oct 2019 07:25) (18 - 18)  SpO2: 97% (22 Oct 2019 07:25) (97% - 97%)    PHYSICAL EXAM:  GENERAL: NAD, well-appearing  CHEST/LUNG: Clear to auscultation bilaterally  HEART: Regular rate and rhythm  ABDOMEN: Soft, Nontender, Nondistended;   EXTREMITIES: Left foot erythematous, b/l feet are cool but L is cooler than right in temperature, good range of motion b/l, sensation is reduced on left, inability to feel on left plantar aspect of foot     Pulses:   DP - right palpable, left dopplerable   PT - right palpable, left dopplerable   Popliteal: dopplerable b/l     LABS:  Labs:  CAPILLARY BLOOD GLUCOSE                              13.0   9.64  )-----------( 180      ( 22 Oct 2019 07:52 )             41.6       Auto Neutrophil %: 64.6 % (10-22-19 @ 07:52)  Auto Immature Granulocyte %: 0.2 % (10-22-19 @ 07:52)    10-22    136  |  94<L>  |  15  ----------------------------<  266<H>  4.0   |  29  |  0.7      Calcium, Total Serum: 9.6 mg/dL (10-22-19 @ 07:52)      LFTs:             7.1  | 0.8  | 28       ------------------[85      ( 22 Oct 2019 07:52 )  4.5  | x    | 36          Lipase:x      Amylase:x         Lactate, Blood: 2.1 mmol/L (10-22-19 @ 07:52)      Coags:     12.90  ----< 1.12    ( 22 Oct 2019 07:52 )     30.9          RADIOLOGY & ADDITIONAL STUDIES:    Acute occlusion of left superficial femoral artery with severely diminished   flow distally. High-grade stenosis left profunda femoris artery. PORFIRIO HERRERA [id number]  64y Male      HPI:  65 y/o male with pmhx of CAD s/p 2 stents, DM, DLD, HTN presents with worsening left foot numbness, discolouration, and pain for the past 3-4 days. He has not had these symptoms before. Pain is intermittent, described as sharp and located on the lateral plantar surface. It is associated with a discolouration of the digits, numbness, and tingling. The pain is exacerbated by walking and nothing relives it. There is no radiation of pain/numbess. No pain in hip, thighs, or calfs with walking. Currently the patient has no chest pain, shortness of breath, nausea, vomiting, change in bowel habit, or abdominal pain. He has not seen a vascular surgeon in the past. Patient is a current smoker with a 30 pack year history. No family history of thrombosis.     PAST MEDICAL & SURGICAL HISTORY:  CAD (coronary artery disease)  Dyslipidemia  Diabetes  HTN (hypertension)  History of coronary artery stent placement: x 2 stents        MEDICATIONS  (STANDING):    MEDICATIONS  (PRN):      Allergies    No Known Allergies    Intolerances        REVIEW OF SYSTEMS    [x A ten-point review of systems was otherwise negative except as noted.  [ ] Due to altered mental status/intubation, subjective information were not able to be obtained from the patient. History was obtained, to the extent possible, from review of the chart and collateral sources of information.      Vital Signs Last 24 Hrs  T(C): 35.8 (22 Oct 2019 07:25), Max: 35.8 (22 Oct 2019 07:25)  T(F): 96.4 (22 Oct 2019 07:25), Max: 96.4 (22 Oct 2019 07:25)  HR: 95 (22 Oct 2019 07:25) (95 - 95)  BP: 138/76 (22 Oct 2019 07:25) (138/76 - 138/76)  BP(mean): --  RR: 18 (22 Oct 2019 07:25) (18 - 18)  SpO2: 97% (22 Oct 2019 07:25) (97% - 97%)    PHYSICAL EXAM:  GENERAL: NAD, well-appearing  CHEST/LUNG: Clear to auscultation bilaterally  HEART: Regular rate and rhythm  ABDOMEN: Soft, Nontender, Nondistended;   EXTREMITIES: Left foot erythematous, b/l feet are cool but L is cooler than right in temperature, good range of motion b/l, sensation is reduced on left, inability to feel on left plantar aspect of foot     Pulses:   DP - right palpable, left dopplerable   PT - right palpable, left dopplerable   Popliteal: dopplerable b/l     LABS:  Labs:  CAPILLARY BLOOD GLUCOSE                              13.0   9.64  )-----------( 180      ( 22 Oct 2019 07:52 )             41.6       Auto Neutrophil %: 64.6 % (10-22-19 @ 07:52)  Auto Immature Granulocyte %: 0.2 % (10-22-19 @ 07:52)    10-22    136  |  94<L>  |  15  ----------------------------<  266<H>  4.0   |  29  |  0.7      Calcium, Total Serum: 9.6 mg/dL (10-22-19 @ 07:52)      LFTs:             7.1  | 0.8  | 28       ------------------[85      ( 22 Oct 2019 07:52 )  4.5  | x    | 36          Lipase:x      Amylase:x         Lactate, Blood: 2.1 mmol/L (10-22-19 @ 07:52)      Coags:     12.90  ----< 1.12    ( 22 Oct 2019 07:52 )     30.9          RADIOLOGY & ADDITIONAL STUDIES:    Acute occlusion of left superficial femoral artery with severely diminished   flow distally. High-grade stenosis left profunda femoris artery.

## 2019-10-22 NOTE — ED ADULT NURSE NOTE - PMH
Diabetes    HTN (hypertension) CAD (coronary artery disease)    Diabetes    Dyslipidemia    HTN (hypertension)

## 2019-10-22 NOTE — H&P ADULT - NSICDXPASTMEDICALHX_GEN_ALL_CORE_FT
PAST MEDICAL HISTORY:  CAD (coronary artery disease)     Diabetes     Dyslipidemia     HTN (hypertension)

## 2019-10-22 NOTE — ED PROVIDER NOTE - ATTENDING CONTRIBUTION TO CARE
63yo man h/o CAD s/p 2 stents, not on ASA currently c/o numbness and pain to his L foot x 4 days, progressively worse. Denies prior episodes, chest pain, HA, SOB, nausea, vomiting. On exam he is very well appearing, lungs CTA, CVS1S2 RRR abd soft, NT. LLE hyperemic at the distal toes, decreased pulse (but dopplerable) on the L foot; will do labs and send for arterial duplex to assess for arterial occlusion.

## 2019-10-23 LAB
ALBUMIN SERPL ELPH-MCNC: 4.3 G/DL — SIGNIFICANT CHANGE UP (ref 3.5–5.2)
ALP SERPL-CCNC: 69 U/L — SIGNIFICANT CHANGE UP (ref 30–115)
ALT FLD-CCNC: 35 U/L — SIGNIFICANT CHANGE UP (ref 0–41)
ANION GAP SERPL CALC-SCNC: 12 MMOL/L — SIGNIFICANT CHANGE UP (ref 7–14)
APTT BLD: 101.5 SEC — CRITICAL HIGH (ref 27–39.2)
APTT BLD: 111.3 SEC — CRITICAL HIGH (ref 27–39.2)
APTT BLD: 74.2 SEC — CRITICAL HIGH (ref 27–39.2)
APTT BLD: 77.8 SEC — CRITICAL HIGH (ref 27–39.2)
AST SERPL-CCNC: 27 U/L — SIGNIFICANT CHANGE UP (ref 0–41)
BILIRUB SERPL-MCNC: 0.8 MG/DL — SIGNIFICANT CHANGE UP (ref 0.2–1.2)
BUN SERPL-MCNC: 13 MG/DL — SIGNIFICANT CHANGE UP (ref 10–20)
CALCIUM SERPL-MCNC: 9.3 MG/DL — SIGNIFICANT CHANGE UP (ref 8.5–10.1)
CHLORIDE SERPL-SCNC: 99 MMOL/L — SIGNIFICANT CHANGE UP (ref 98–110)
CHOLEST SERPL-MCNC: 129 MG/DL — SIGNIFICANT CHANGE UP (ref 100–200)
CO2 SERPL-SCNC: 30 MMOL/L — SIGNIFICANT CHANGE UP (ref 17–32)
CREAT SERPL-MCNC: 0.7 MG/DL — SIGNIFICANT CHANGE UP (ref 0.7–1.5)
ESTIMATED AVERAGE GLUCOSE: 200 MG/DL — HIGH (ref 68–114)
GLUCOSE BLDC GLUCOMTR-MCNC: 100 MG/DL — HIGH (ref 70–99)
GLUCOSE BLDC GLUCOMTR-MCNC: 165 MG/DL — HIGH (ref 70–99)
GLUCOSE BLDC GLUCOMTR-MCNC: 231 MG/DL — HIGH (ref 70–99)
GLUCOSE BLDC GLUCOMTR-MCNC: 314 MG/DL — HIGH (ref 70–99)
GLUCOSE SERPL-MCNC: 160 MG/DL — HIGH (ref 70–99)
HBA1C BLD-MCNC: 8.6 % — HIGH (ref 4–5.6)
HCT VFR BLD CALC: 41.6 % — LOW (ref 42–52)
HCV AB S/CO SERPL IA: 0.17 S/CO — SIGNIFICANT CHANGE UP (ref 0–0.99)
HCV AB SERPL-IMP: SIGNIFICANT CHANGE UP
HDLC SERPL-MCNC: 37 MG/DL — LOW
HGB BLD-MCNC: 13 G/DL — LOW (ref 14–18)
LIPID PNL WITH DIRECT LDL SERPL: 82 MG/DL — SIGNIFICANT CHANGE UP (ref 4–129)
MAGNESIUM SERPL-MCNC: 1.9 MG/DL — SIGNIFICANT CHANGE UP (ref 1.8–2.4)
MCHC RBC-ENTMCNC: 20 PG — LOW (ref 27–31)
MCHC RBC-ENTMCNC: 31.3 G/DL — LOW (ref 32–37)
MCV RBC AUTO: 64 FL — LOW (ref 80–94)
NRBC # BLD: 0 /100 WBCS — SIGNIFICANT CHANGE UP (ref 0–0)
PLATELET # BLD AUTO: 154 K/UL — SIGNIFICANT CHANGE UP (ref 130–400)
POTASSIUM SERPL-MCNC: 4.2 MMOL/L — SIGNIFICANT CHANGE UP (ref 3.5–5)
POTASSIUM SERPL-SCNC: 4.2 MMOL/L — SIGNIFICANT CHANGE UP (ref 3.5–5)
PROT SERPL-MCNC: 6.7 G/DL — SIGNIFICANT CHANGE UP (ref 6–8)
RBC # BLD: 6.5 M/UL — HIGH (ref 4.7–6.1)
RBC # FLD: 14.4 % — SIGNIFICANT CHANGE UP (ref 11.5–14.5)
SODIUM SERPL-SCNC: 141 MMOL/L — SIGNIFICANT CHANGE UP (ref 135–146)
TOTAL CHOLESTEROL/HDL RATIO MEASUREMENT: 3.5 RATIO — LOW (ref 4–5.5)
TRIGL SERPL-MCNC: 112 MG/DL — SIGNIFICANT CHANGE UP (ref 10–149)
WBC # BLD: 6.98 K/UL — SIGNIFICANT CHANGE UP (ref 4.8–10.8)
WBC # FLD AUTO: 6.98 K/UL — SIGNIFICANT CHANGE UP (ref 4.8–10.8)

## 2019-10-23 PROCEDURE — 93010 ELECTROCARDIOGRAM REPORT: CPT

## 2019-10-23 RX ORDER — HYDROXYZINE HCL 10 MG
25 TABLET ORAL ONCE
Refills: 0 | Status: COMPLETED | OUTPATIENT
Start: 2019-10-23 | End: 2019-10-23

## 2019-10-23 RX ORDER — HEPARIN SODIUM 5000 [USP'U]/ML
1300 INJECTION INTRAVENOUS; SUBCUTANEOUS
Qty: 25000 | Refills: 0 | Status: DISCONTINUED | OUTPATIENT
Start: 2019-10-23 | End: 2019-10-25

## 2019-10-23 RX ORDER — HEPARIN SODIUM 5000 [USP'U]/ML
1400 INJECTION INTRAVENOUS; SUBCUTANEOUS
Qty: 25000 | Refills: 0 | Status: DISCONTINUED | OUTPATIENT
Start: 2019-10-23 | End: 2019-10-23

## 2019-10-23 RX ORDER — ADENOSINE 3 MG/ML
60 INJECTION INTRAVENOUS ONCE
Refills: 0 | Status: COMPLETED | OUTPATIENT
Start: 2019-10-23 | End: 2019-10-24

## 2019-10-23 RX ADMIN — HEPARIN SODIUM 13 UNIT(S)/HR: 5000 INJECTION INTRAVENOUS; SUBCUTANEOUS at 08:19

## 2019-10-23 RX ADMIN — Medication 50 MILLIGRAM(S): at 18:04

## 2019-10-23 RX ADMIN — LISINOPRIL 20 MILLIGRAM(S): 2.5 TABLET ORAL at 05:22

## 2019-10-23 RX ADMIN — Medication 50 MILLIGRAM(S): at 05:21

## 2019-10-23 RX ADMIN — ATORVASTATIN CALCIUM 80 MILLIGRAM(S): 80 TABLET, FILM COATED ORAL at 22:34

## 2019-10-23 RX ADMIN — Medication 2: at 12:07

## 2019-10-23 RX ADMIN — Medication 81 MILLIGRAM(S): at 12:07

## 2019-10-23 RX ADMIN — Medication 25 MILLIGRAM(S): at 14:56

## 2019-10-23 RX ADMIN — HEPARIN SODIUM 1400 UNIT(S)/HR: 5000 INJECTION INTRAVENOUS; SUBCUTANEOUS at 04:02

## 2019-10-23 RX ADMIN — INSULIN GLARGINE 18 UNIT(S): 100 INJECTION, SOLUTION SUBCUTANEOUS at 22:34

## 2019-10-23 RX ADMIN — HEPARIN SODIUM 1500 UNIT(S)/HR: 5000 INJECTION INTRAVENOUS; SUBCUTANEOUS at 00:00

## 2019-10-23 RX ADMIN — Medication 6 UNIT(S): at 12:07

## 2019-10-23 NOTE — PROGRESS NOTE ADULT - SUBJECTIVE AND OBJECTIVE BOX
Patient's chart reviewed and patient examined by Torsten Reyes MD (contact:[phone])    SUBJ: left foot pain is better.    Events in last 24 hours: on IV Heparin.      MEDICATIONS  (STANDING):  aDENosine Injectable (ADENOSCAN) 60 milliGRAM(s) IV Bolus once  aspirin enteric coated 81 milliGRAM(s) Oral daily  atorvastatin 80 milliGRAM(s) Oral at bedtime  chlorhexidine 4% Liquid 1 Application(s) Topical <User Schedule>  dextrose 5%. 1000 milliLiter(s) (50 mL/Hr) IV Continuous <Continuous>  dextrose 50% Injectable 12.5 Gram(s) IV Push once  dextrose 50% Injectable 25 Gram(s) IV Push once  dextrose 50% Injectable 25 Gram(s) IV Push once  heparin  Infusion 1300 Unit(s)/Hr (13 mL/Hr) IV Continuous <Continuous>  insulin glargine Injectable (LANTUS) 18 Unit(s) SubCutaneous at bedtime  insulin lispro (HumaLOG) corrective regimen sliding scale   SubCutaneous three times a day before meals  insulin lispro Injectable (HumaLOG) 6 Unit(s) SubCutaneous three times a day before meals  lisinopril 20 milliGRAM(s) Oral daily  metoprolol tartrate 50 milliGRAM(s) Oral two times a day    MEDICATIONS  (PRN):  dextrose 40% Gel 15 Gram(s) Oral once PRN Blood Glucose LESS THAN 70 milliGRAM(s)/deciliter  glucagon  Injectable 1 milliGRAM(s) IntraMuscular once PRN Glucose LESS THAN 70 milligrams/deciliter  heparin  Injectable 6500 Unit(s) IV Push every 6 hours PRN For aPTT less than 40  heparin  Injectable 3000 Unit(s) IV Push every 6 hours PRN For aPTT between 40 - 57          Vital Signs Last 24 Hrs  T(C): 36.3 (23 Oct 2019 15:32), Max: 36.3 (23 Oct 2019 00:00)  T(F): 97.4 (23 Oct 2019 15:32), Max: 97.4 (23 Oct 2019 00:00)  HR: 90 (23 Oct 2019 15:32) (66 - 90)  BP: 109/64 (23 Oct 2019 15:32) (109/64 - 154/67)  BP(mean): --  RR: 18 (23 Oct 2019 15:32) (18 - 18)  SpO2: 97% (23 Oct 2019 05:19) (97% - 97%)     REVIEW OF SYSTEMS:  CONSTITUTIONAL: No fever, chills.  CARDIOLOGY: Denies chest pain, shortness of breath or palpitations.   RESPIRATORY: denies cough, shortness of breath, wheezing.   NEUROLOGICAL: Generalized weakness, no focal deficits to report.  GI: no melena/hematochezia, denies nausea, Vomiting or diarrhea.    PSYCHIATRY: normal mood and affect    PHYSICAL EXAM:  · CONSTITUTIONAL:	Well-developed,  M in no distress    ·RESPIRATORY:   breath sounds severely diminished;  diffuse wheezing  · CARDIOVASCULAR	S1 and S2soft intensity, no rub, gallop or murmur,    ABDOMEN: soft, non-tender, NABS  · EXTREMITIES: No cyanosis, clubbing or edema  NEURO: alert and oriented x 3, no focal deficits.  · VASCULAR: 	absent pulses left POp, DP, PT  	    LABS:                        13.0   6.98  )-----------( 154      ( 23 Oct 2019 05:25 )             41.6     10-23    141  |  99  |  13  ----------------------------<  160<H>  4.2   |  30  |  0.7    Ca    9.3      23 Oct 2019 05:25  Mg     1.9     10-23    TPro  6.7  /  Alb  4.3  /  TBili  0.8  /  DBili  x   /  AST  27  /  ALT  35  /  AlkPhos  69  10-23        PT/INR - ( 22 Oct 2019 07:52 )   PT: 12.90 sec;   INR: 1.12 ratio         PTT - ( 23 Oct 2019 11:33 )  PTT:77.8 sec    I&O's Summary    22 Oct 2019 07:01  -  23 Oct 2019 07:00  --------------------------------------------------------  IN: 175 mL / OUT: 0 mL / NET: 175 mL    23 Oct 2019 07:01  -  23 Oct 2019 23:06  --------------------------------------------------------  IN: 144 mL / OUT: 0 mL / NET: 144 mL      BNP  RADIOLOGY & ADDITIONAL STUDIES:    IMPRESSION AND PLAN:

## 2019-10-23 NOTE — CHART NOTE - NSCHARTNOTEFT_GEN_A_CORE
Dx: left leg rest pain, CFA/SFA occlusion with reconstitution    Plan: for left femoral to popliteal artery  bypass Friday  Please, confirm with cardiology risks assessment  echo noted  Pre-op labs, T&S, EKG, CXR tomorrow

## 2019-10-23 NOTE — PROVIDER CONTACT NOTE (OTHER) - BACKGROUND
Pt scheduled for bypass Friday, Cardiology Dr. Reyes has seen pt already and did not mention stress test to pt.

## 2019-10-24 LAB
ANION GAP SERPL CALC-SCNC: 11 MMOL/L — SIGNIFICANT CHANGE UP (ref 7–14)
APTT BLD: 65.8 SEC — HIGH (ref 27–39.2)
APTT BLD: 70.5 SEC — CRITICAL HIGH (ref 27–39.2)
BUN SERPL-MCNC: 12 MG/DL — SIGNIFICANT CHANGE UP (ref 10–20)
CALCIUM SERPL-MCNC: 9.7 MG/DL — SIGNIFICANT CHANGE UP (ref 8.5–10.1)
CHLORIDE SERPL-SCNC: 98 MMOL/L — SIGNIFICANT CHANGE UP (ref 98–110)
CO2 SERPL-SCNC: 31 MMOL/L — SIGNIFICANT CHANGE UP (ref 17–32)
CREAT SERPL-MCNC: 0.6 MG/DL — LOW (ref 0.7–1.5)
GLUCOSE BLDC GLUCOMTR-MCNC: 149 MG/DL — HIGH (ref 70–99)
GLUCOSE BLDC GLUCOMTR-MCNC: 197 MG/DL — HIGH (ref 70–99)
GLUCOSE BLDC GLUCOMTR-MCNC: 204 MG/DL — HIGH (ref 70–99)
GLUCOSE BLDC GLUCOMTR-MCNC: 213 MG/DL — HIGH (ref 70–99)
GLUCOSE BLDC GLUCOMTR-MCNC: 98 MG/DL — SIGNIFICANT CHANGE UP (ref 70–99)
GLUCOSE SERPL-MCNC: 188 MG/DL — HIGH (ref 70–99)
HCT VFR BLD CALC: 42.2 % — SIGNIFICANT CHANGE UP (ref 42–52)
HGB BLD-MCNC: 12.8 G/DL — LOW (ref 14–18)
INR BLD: 1.07 RATIO — SIGNIFICANT CHANGE UP (ref 0.65–1.3)
MCHC RBC-ENTMCNC: 19.5 PG — LOW (ref 27–31)
MCHC RBC-ENTMCNC: 30.3 G/DL — LOW (ref 32–37)
MCV RBC AUTO: 64.4 FL — LOW (ref 80–94)
NRBC # BLD: 0 /100 WBCS — SIGNIFICANT CHANGE UP (ref 0–0)
PLATELET # BLD AUTO: 175 K/UL — SIGNIFICANT CHANGE UP (ref 130–400)
POTASSIUM SERPL-MCNC: 4.1 MMOL/L — SIGNIFICANT CHANGE UP (ref 3.5–5)
POTASSIUM SERPL-SCNC: 4.1 MMOL/L — SIGNIFICANT CHANGE UP (ref 3.5–5)
PROTHROM AB SERPL-ACNC: 12.3 SEC — SIGNIFICANT CHANGE UP (ref 9.95–12.87)
RBC # BLD: 6.55 M/UL — HIGH (ref 4.7–6.1)
RBC # FLD: 15.7 % — HIGH (ref 11.5–14.5)
SODIUM SERPL-SCNC: 140 MMOL/L — SIGNIFICANT CHANGE UP (ref 135–146)
WBC # BLD: 10.31 K/UL — SIGNIFICANT CHANGE UP (ref 4.8–10.8)
WBC # FLD AUTO: 10.31 K/UL — SIGNIFICANT CHANGE UP (ref 4.8–10.8)

## 2019-10-24 PROCEDURE — 78452 HT MUSCLE IMAGE SPECT MULT: CPT | Mod: 26

## 2019-10-24 PROCEDURE — 93018 CV STRESS TEST I&R ONLY: CPT

## 2019-10-24 PROCEDURE — 93016 CV STRESS TEST SUPVJ ONLY: CPT

## 2019-10-24 PROCEDURE — 71045 X-RAY EXAM CHEST 1 VIEW: CPT | Mod: 26

## 2019-10-24 RX ORDER — SODIUM CHLORIDE 9 MG/ML
1000 INJECTION INTRAMUSCULAR; INTRAVENOUS; SUBCUTANEOUS
Refills: 0 | Status: DISCONTINUED | OUTPATIENT
Start: 2019-10-24 | End: 2019-10-25

## 2019-10-24 RX ADMIN — ADENOSINE 600 MILLIGRAM(S): 3 INJECTION INTRAVENOUS at 10:30

## 2019-10-24 RX ADMIN — Medication 81 MILLIGRAM(S): at 12:39

## 2019-10-24 RX ADMIN — HEPARIN SODIUM 13 UNIT(S)/HR: 5000 INJECTION INTRAVENOUS; SUBCUTANEOUS at 16:12

## 2019-10-24 RX ADMIN — Medication 50 MILLIGRAM(S): at 07:18

## 2019-10-24 RX ADMIN — Medication 1: at 12:40

## 2019-10-24 RX ADMIN — LISINOPRIL 20 MILLIGRAM(S): 2.5 TABLET ORAL at 07:18

## 2019-10-24 RX ADMIN — ATORVASTATIN CALCIUM 80 MILLIGRAM(S): 80 TABLET, FILM COATED ORAL at 21:48

## 2019-10-24 RX ADMIN — Medication 50 MILLIGRAM(S): at 17:23

## 2019-10-24 RX ADMIN — Medication 6 UNIT(S): at 12:40

## 2019-10-24 RX ADMIN — INSULIN GLARGINE 18 UNIT(S): 100 INJECTION, SOLUTION SUBCUTANEOUS at 21:49

## 2019-10-24 NOTE — CHART NOTE - NSCHARTNOTEFT_GEN_A_CORE
Patient: PORFIRIO HERRERA  MRN: [id number]  64y Male  Location: [facility] [id number] C  10-24-19 @ 16:08    Vitals:  T(F): 97.6 (10-24-19 @ 15:30), Max: 98 (10-24-19 @ 07:16)  HR: 86 (10-24-19 @ 15:30) (70 - 86)  BP: 120/58 (10-24-19 @ 15:30) (120/58 - 142/70)  RR: 18 (10-24-19 @ 15:30) (18 - 18)  SpO2: 94% (10-24-19 @ 07:00) (94% - 94%)    Procedure: Left femoral to popliteal bypass   Pre-Op Diagnosis: occlusion of SFA  Consent in Chart: [  ] Yes [  ] No  DIET:   Diet, DASH/TLC:   Sodium & Cholesterol Restricted  Consistent Carbohydrate {No Snacks}  Diet, NPO after Midnight:      NPO Start Date: 24-Oct-2019,   NPO Start Time: 23:59  Except Medications  With Ice Chips/Sips of Water    FLUIDS: NS at 100 when NPO       EK Lead ECG:   Ventricular Rate 78 BPM    Atrial Rate 78 BPM    P-R Interval 172 ms    QRS Duration 154 ms    Q-T Interval 428 ms    QTC Calculation(Bezet) 487 ms    P Axis 75 degrees    R Axis -78 degrees    T Axis 12 degrees    Diagnosis Line Sinus rhythm with Premature supraventricular complexes  Left axis deviation  Right bundle branch block  Minimal voltage criteria for LVH, may be normal variant  Septal infarct , age undetermined  Abnormal ECG    Confirmed by Darius Dalton ([id number]) on 10/23/2019 7:09:38 PM  (10-23-19 @ 16:04)    CXR:  Xray Chest 1 View- PORTABLE-Routine:   EXAM:  XR CHEST PORTABLE ROUTINE 1V            PROCEDURE DATE:  10/24/2019        INTERPRETATION:  Clinical History / Reason for exam: Preoperative   evaluation    Comparison : Chest radiograph from 1/10/2018.    Technique/Positioning: Single frontal view of chest.    Findings:    Support devices: None.    Cardiac/mediastinum/hilum: Unremarkable.    Lung parenchyma/Pleura: No focal consolidation, effusion or pneumothorax.    Skeleton/soft tissues: Unremarkable.    Impression:      No radiographic evidence of acute cardiopulmonary disease.        LAMBERTO DOCKERY M.D., RESIDENT RADIOLOGIST  This document has been electronically signed.  MARYANN DUMONT M.D., ATTENDING RADIOLOGIST  This document has been electronically signed. Oct 24 2019  2:22PM             (10-24-19 @ 06:43)    Pre-OP Labs:  CAPILLARY BLOOD GLUCOSE      POCT Blood Glucose.: 197 mg/dL (24 Oct 2019 12:38)  POCT Blood Glucose.: 204 mg/dL (24 Oct 2019 11:24)  POCT Blood Glucose.: 149 mg/dL (24 Oct 2019 08:19)  POCT Blood Glucose.: 314 mg/dL (23 Oct 2019 21:33)  POCT Blood Glucose.: 100 mg/dL (23 Oct 2019 16:59)                          12.8   10. )-----------( 175      ( 24 Oct 2019 12:29 )             42.2     10-24    140  |  98  |  12  ----------------------------<  188<H>  4.1   |  31  |  0.6<L>    Ca    9.7      24 Oct 2019 12:29  Mg     1.9     10-23    TPro  6.7  /  Alb  4.3  /  TBili  0.8  /  DBili  x   /  AST  27  /  ALT  35  /  AlkPhos  69  10-23    PT/INR - ( 24 Oct 2019 12:29 )   PT: 12.30 sec;   INR: 1.07 ratio         PTT - ( 24 Oct 2019 12:29 )  PTT:70.5 sec    Type & Screen: ABO RH Interpretation: O POS (10.24.19 @ 12:29)    Anticoagulation/Antiplatelet:  heparin  Infusion 1300 Unit(s)/Hr IV Continuous <Continuous>  heparin  Injectable 6500 Unit(s) IV Push every 6 hours PRN  heparin  Injectable 3000 Unit(s) IV Push every 6 hours PRN

## 2019-10-24 NOTE — PROGRESS NOTE ADULT - SUBJECTIVE AND OBJECTIVE BOX
Patient was seen and examined. Spoke with RN. Chart reviewed.    No events overnight.  Vital Signs Last 24 Hrs  T(F): 98 (24 Oct 2019 07:16), Max: 98 (24 Oct 2019 07:16)  HR: 78 (24 Oct 2019 07:16) (70 - 90)  BP: 140/66 (24 Oct 2019 07:16) (109/64 - 142/70)  SpO2: 94% (24 Oct 2019 07:00) (94% - 94%)  MEDICATIONS  (STANDING):  aDENosine Injectable (ADENOSCAN) 60 milliGRAM(s) IV Bolus once  aspirin enteric coated 81 milliGRAM(s) Oral daily  atorvastatin 80 milliGRAM(s) Oral at bedtime  chlorhexidine 4% Liquid 1 Application(s) Topical <User Schedule>  dextrose 5%. 1000 milliLiter(s) (50 mL/Hr) IV Continuous <Continuous>  dextrose 50% Injectable 12.5 Gram(s) IV Push once  dextrose 50% Injectable 25 Gram(s) IV Push once  dextrose 50% Injectable 25 Gram(s) IV Push once  heparin  Infusion 1300 Unit(s)/Hr (13 mL/Hr) IV Continuous <Continuous>  insulin glargine Injectable (LANTUS) 18 Unit(s) SubCutaneous at bedtime  insulin lispro (HumaLOG) corrective regimen sliding scale   SubCutaneous three times a day before meals  insulin lispro Injectable (HumaLOG) 6 Unit(s) SubCutaneous three times a day before meals  lisinopril 20 milliGRAM(s) Oral daily  metoprolol tartrate 50 milliGRAM(s) Oral two times a day    MEDICATIONS  (PRN):  dextrose 40% Gel 15 Gram(s) Oral once PRN Blood Glucose LESS THAN 70 milliGRAM(s)/deciliter  glucagon  Injectable 1 milliGRAM(s) IntraMuscular once PRN Glucose LESS THAN 70 milligrams/deciliter  heparin  Injectable 6500 Unit(s) IV Push every 6 hours PRN For aPTT less than 40  heparin  Injectable 3000 Unit(s) IV Push every 6 hours PRN For aPTT between 40 - 57    Labs:                        13.0   6.98  )-----------( 154      ( 23 Oct 2019 05:25 )             41.6     23 Oct 2019 05:25    141    |  99     |  13     ----------------------------<  160    4.2     |  30     |  0.7      Ca    9.3        23 Oct 2019 05:25  Mg     1.9       23 Oct 2019 05:25    TPro  6.7    /  Alb  4.3    /  TBili  0.8    /  DBili  x      /  AST  27     /  ALT  35     /  AlkPhos  69     23 Oct 2019 05:25    PT/INR - ( 24 Oct 2019 12:29 )   PT: 12.30 sec;   INR: 1.07 ratio         PTT - ( 23 Oct 2019 21:29 )  PTT:74.2 sec        Radiology:    General: comfortable, NAD  Neurology: A&Ox3, nonfocal  Head:  Normocephalic, atraumatic  ENT:  Mucosa moist, no ulcerations  Neck:  Supple, no JVD,   Skin: no breakdowns (as per RN)  Resp: CTA B/L  CV: RRR, S1S2,   GI: Soft, NT, bowel sounds  MS: decreased pulses, + peripheral pulses, FROM all 4 extremity

## 2019-10-24 NOTE — PROGRESS NOTE ADULT - SUBJECTIVE AND OBJECTIVE BOX
PORFIRIO HERRERA 64y Male  MRN#: [id number]     SUBJECTIVE  Patient is a 64y old Male who presents with a chief complaint of Left foot numbness and coldness (22 Oct 2019 21:00)  Currently admitted to medicine with the primary diagnosis of Vascular occlusion    Today is hospital day 2d, and this morning he reports numbness on his right lower extremity minimal pain, still the same as yesterday. He is anxious about OR.     OBJECTIVE  PAST MEDICAL & SURGICAL HISTORY  CAD (coronary artery disease)  Dyslipidemia  Diabetes  HTN (hypertension)  History of coronary artery stent placement: x 2 stents    ALLERGIES:  No Known Allergies    MEDICATIONS:  STANDING MEDICATIONS  aspirin enteric coated 81 milliGRAM(s) Oral daily  atorvastatin 80 milliGRAM(s) Oral at bedtime  chlorhexidine 4% Liquid 1 Application(s) Topical <User Schedule>  dextrose 5%. 1000 milliLiter(s) IV Continuous <Continuous>  dextrose 50% Injectable 12.5 Gram(s) IV Push once  dextrose 50% Injectable 25 Gram(s) IV Push once  dextrose 50% Injectable 25 Gram(s) IV Push once  heparin  Infusion. 1400 Unit(s)/Hr IV Continuous <Continuous>  insulin glargine Injectable (LANTUS) 18 Unit(s) SubCutaneous at bedtime  insulin lispro (HumaLOG) corrective regimen sliding scale   SubCutaneous three times a day before meals  insulin lispro Injectable (HumaLOG) 6 Unit(s) SubCutaneous three times a day before meals  lisinopril 20 milliGRAM(s) Oral daily  metoprolol tartrate 50 milliGRAM(s) Oral two times a day    PRN MEDICATIONS  dextrose 40% Gel 15 Gram(s) Oral once PRN  glucagon  Injectable 1 milliGRAM(s) IntraMuscular once PRN  heparin  Injectable 6500 Unit(s) IV Push every 6 hours PRN  heparin  Injectable 3000 Unit(s) IV Push every 6 hours PRN    VITAL SIGNS: Last 24 Hours  Vital Signs Last 24 Hrs  T(C): 36.2 (23 Oct 2019 07:51), Max: 36.4 (22 Oct 2019 15:51)  T(F): 97.1 (23 Oct 2019 07:51), Max: 97.6 (22 Oct 2019 15:51)  HR: 66 (23 Oct 2019 07:51) (66 - 90)  BP: 131/72 (23 Oct 2019 07:51) (129/78 - 154/67)  BP(mean): --  RR: 18 (23 Oct 2019 07:51) (16 - 18)  SpO2: 97% (23 Oct 2019 05:19) (97% - 98%)  LABS:                        13.0   6.98  )-----------( 154      ( 23 Oct 2019 05:25 )             41.6     10-23    141  |  99  |  13  ----------------------------<  160<H>  4.2   |  30  |  0.7    Ca    9.3      23 Oct 2019 05:25  Mg     1.9     10-23    TPro  6.7  /  Alb  4.3  /  TBili  0.8  /  DBili  x   /  AST  27  /  ALT  35  /  AlkPhos  69  10-23    PT/INR - ( 22 Oct 2019 07:52 )   PT: 12.90 sec;   INR: 1.12 ratio      PTT - ( 23 Oct 2019 05:25 )  PTT:101.5 sec    Lactate, Blood: 2.1 mmol/L (10-22-19 @ 07:52)    RADIOLOGY:  < from: CT Angio Abd Aorta w/run-off w/ IV Cont (10.22.19 @ 15:57) >  IMPRESSION:    1.  Mild abdominal aortic atherosclerotic disease. Mild narrowing at the   celiac artery ostium, with severe narrowing of proximal splenic artery.   Bilateral renal arteries are grossly patent.    2.  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 The distal right external iliac artery, common femoral artery,   SFA/profunda femoris arteries, and popliteal artery are patent. Intact   three-vessel runoff to the right ankle and foot.    2.  Left lower extremity: Severe tandem disease of the left internal   iliac artery. The left external iliac and common femoral artery are   patent. There is complete occlusion of the left superficial femoral   artery at the level below the femoral neck, extending to the level of the   mid thigh. There is subsequent reconstitution of flow. The left popliteal   artery is patent, with intact two-vessel runoff to the left ankle and   foot. Severely diseased anterior tibialartery at the level midshin down   to the ankle, dorsalis pedis artery is patent.    4.  Thickened bilateral adrenal glands, with fatty components concerning   for bilateral adrenal adenomas. Nonurgent laboratory workup is   recommended.    PHYSICAL EXAM:    GENERAL: NAD, well-developed, AAOx3  HEENT:  Atraumatic, Normocephalic. EOMI, PERRLA, conjunctiva and sclera clear, No JVD  PULMONARY: Clear to auscultation bilaterally; No wheeze  CARDIOVASCULAR: Regular rate and rhythm; No murmurs, rubs, or gallops  GASTROINTESTINAL: Soft, Nontender, Nondistended; Bowel sounds present  MUSCULOSKELETAL:  no pedal pulses appreciated. Left leg is cold, but no blue-arturo discoloration.   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     Vascular: complete occlusion of left superficial femoral artery (on Duplex and CT angio)  - Patient also has severe PVD on several other vessels.   - being seen by vascular surgery  - will need bypass - planning for Friday  - on Heparin drip with pTT q6hrs  - on aspirin    Cardio: (1) CAD s/p PCI  - on aspirin  - Echo: good ejection fraction, no valvular abnormalities  - following with cardio to consider further pre-op eval.    (2) HTN  - lisinopril and metoprolol   - BP well controlled  (4) DLD  - on statin     Endo: DMII  - on Lantus 18U and lispro 6U    DVT prophylaxis: heparin drip  Diet: DASH and NPO on thursday after midnight  Activity: as tolerated  Dispo: acute PORFIRIO HERRERA 64y Male  MRN#: [id number]     SUBJECTIVE  Patient is a 64y old Male who presents with a chief complaint of Left foot numbness and coldness (22 Oct 2019 21:00)  Currently admitted to medicine with the primary diagnosis of Vascular occlusion    Today is hospital day 2d, and this morning he reports numbness on his right lower extremity minimal pain, still the same as yesterday. He is anxious about OR.     OBJECTIVE  PAST MEDICAL & SURGICAL HISTORY  CAD (coronary artery disease)  Dyslipidemia  Diabetes  HTN (hypertension)  History of coronary artery stent placement: x 2 stents    ALLERGIES:  No Known Allergies    MEDICATIONS:  MEDICATIONS  (STANDING):  aDENosine Injectable (ADENOSCAN) 60 milliGRAM(s) IV Bolus once  aspirin enteric coated 81 milliGRAM(s) Oral daily  atorvastatin 80 milliGRAM(s) Oral at bedtime  chlorhexidine 4% Liquid 1 Application(s) Topical <User Schedule>  dextrose 5%. 1000 milliLiter(s) (50 mL/Hr) IV Continuous <Continuous>  dextrose 50% Injectable 12.5 Gram(s) IV Push once  dextrose 50% Injectable 25 Gram(s) IV Push once  dextrose 50% Injectable 25 Gram(s) IV Push once  heparin  Infusion 1300 Unit(s)/Hr (13 mL/Hr) IV Continuous <Continuous>  insulin glargine Injectable (LANTUS) 18 Unit(s) SubCutaneous at bedtime  insulin lispro (HumaLOG) corrective regimen sliding scale   SubCutaneous three times a day before meals  insulin lispro Injectable (HumaLOG) 6 Unit(s) SubCutaneous three times a day before meals  lisinopril 20 milliGRAM(s) Oral daily  metoprolol tartrate 50 milliGRAM(s) Oral two times a day    MEDICATIONS  (PRN):  dextrose 40% Gel 15 Gram(s) Oral once PRN Blood Glucose LESS THAN 70 milliGRAM(s)/deciliter  glucagon  Injectable 1 milliGRAM(s) IntraMuscular once PRN Glucose LESS THAN 70 milligrams/deciliter  heparin  Injectable 6500 Unit(s) IV Push every 6 hours PRN For aPTT less than 40  heparin  Injectable 3000 Unit(s) IV Push every 6 hours PRN For aPTT between 40 - 57      VITAL SIGNS: Last 24 Hours  Vital Signs Last 24 Hrs  T(C): 36.7 (24 Oct 2019 07:16), Max: 36.7 (24 Oct 2019 07:16)  T(F): 98 (24 Oct 2019 07:16), Max: 98 (24 Oct 2019 07:16)  HR: 78 (24 Oct 2019 07:16) (70 - 90)  BP: 140/66 (24 Oct 2019 07:16) (109/64 - 142/70)  BP(mean): --  RR: 18 (24 Oct 2019 07:16) (18 - 18)  SpO2: 94% (24 Oct 2019 07:00) (94% - 94%)  LABS:  PENDING                 RADIOLOGY:  < from: CT Angio Abd Aorta w/run-off w/ IV Cont (10.22.19 @ 15:57) >  IMPRESSION:    1.  Mild abdominal aortic atherosclerotic disease. Mild narrowing at the   celiac artery ostium, with severe narrowing of proximal splenic artery.   Bilateral renal arteries are grossly patent.    2.  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 The distal right external iliac artery, common femoral artery,   SFA/profunda femoris arteries, and popliteal artery are patent. Intact   three-vessel runoff to the right ankle and foot.    2.  Left lower extremity: Severe tandem disease of the left internal   iliac artery. The left external iliac and common femoral artery are   patent. There is complete occlusion of the left superficial femoral   artery at the level below the femoral neck, extending to the level of the   mid thigh. There is subsequent reconstitution of flow. The left popliteal   artery is patent, with intact two-vessel runoff to the left ankle and   foot. Severely diseased anterior tibialartery at the level midshin down   to the ankle, dorsalis pedis artery is patent.    4.  Thickened bilateral adrenal glands, with fatty components concerning   for bilateral adrenal adenomas. Nonurgent laboratory workup is   recommended.    PHYSICAL EXAM:    GENERAL: NAD, well-developed, AAOx3  HEENT:  Atraumatic, Normocephalic. EOMI, PERRLA, conjunctiva and sclera clear, No JVD  PULMONARY: Clear to auscultation bilaterally; No wheeze  CARDIOVASCULAR: Regular rate and rhythm; No murmurs, rubs, or gallops  GASTROINTESTINAL: Soft, Nontender, Nondistended; Bowel sounds present  MUSCULOSKELETAL:  no pedal pulses appreciated. Left leg is cold, but no blue-arturo discoloration.   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     Vascular: complete occlusion of left superficial femoral artery (on Duplex and CT angio)  - Patient also has severe PVD on several other vessels.   - being seen by vascular surgery  - will need bypass - planning for Friday  - on Heparin drip with pTT q12 hours as patient is currently theraputic for 2 reads.   - on aspirin    Cardio: (1) CAD s/p PCI  - on aspirin  - Echo: good ejection fraction, no valvular abnormalities  - pending results from pharmacological stress test.   (2) HTN  - lisinopril and metoprolol   - BP well controlled  (4) DLD  - on statin     Endo: DMII  - on Lantus 18U and lispro 6U    DVT prophylaxis: heparin drip  Diet: DASH and NPO on thursday after midnight  Activity: as tolerated  Dispo: acute

## 2019-10-24 NOTE — CHART NOTE - NSCHARTNOTEFT_GEN_A_CORE
Informed that nuclear medicine stress test was positive.   I informed cardiology attending.   I informed vascular surgery  Cardiology state that if vascular surgery is NOT limb saving they may consider doing cardiac cath pre-vascular bypass.   If surgery IS limb saving then vascular should proceed with LE bypass surgery.

## 2019-10-24 NOTE — PROGRESS NOTE ADULT - SUBJECTIVE AND OBJECTIVE BOX
GENERAL SURGERY PROGRESS NOTE     CORIN HERRERAO  [address] [place] [address] day :2d  POD:  Procedure:   Surgical Attending: Michael Waldron  Overnight events: no acute events overnight. NPO for stress test in am.    T(F): 97.5 (10-24-19 @ 00:16), Max: 97.5 (10-24-19 @ 00:16)  HR: 70 (10-24-19 @ 00:16) (66 - 90)  BP: 138/67 (10-24-19 @ 00:16) (109/64 - 138/67)  ABP: --  ABP(mean): --  RR: 18 (10-24-19 @ 00:16) (18 - 18)  SpO2: --      10-22-19 @ 07:01  -  10-23-19 @ 07:00  --------------------------------------------------------  IN:    heparin  Infusion.: 175 mL  Total IN: 175 mL    OUT:  Total OUT: 0 mL    Total NET: 175 mL      10-23-19 @ 07:01  -  10-24-19 @ 06:08  --------------------------------------------------------  IN:    heparin  Infusion.: 14 mL    heparin Infusion: 130 mL  Total IN: 144 mL    OUT:  Total OUT: 0 mL    Total NET: 144 mL        DIET/FLUIDS: dextrose 5%. 1000 milliLiter(s) IV Continuous <Continuous>     GI proph:    AC/ proph: aspirin enteric coated 81 milliGRAM(s) Oral daily  heparin  Infusion 1300 Unit(s)/Hr IV Continuous <Continuous>  heparin  Injectable 6500 Unit(s) IV Push every 6 hours PRN  heparin  Injectable 3000 Unit(s) IV Push every 6 hours PRN    ABx:     PHYSICAL EXAM:  GENERAL: NAD, well-appearing  CHEST/LUNG: Clear to auscultation bilaterally  HEART: Regular rate and rhythm  ABDOMEN: Soft, Nontender, Nondistended;   EXTREMITIES:  Pulses:   DP - right palpable, left dopplerable   PT - right palpable, left dopplerable       LABS  Labs:  CAPILLARY BLOOD GLUCOSE      POCT Blood Glucose.: 314 mg/dL (23 Oct 2019 21:33)  POCT Blood Glucose.: 100 mg/dL (23 Oct 2019 16:59)  POCT Blood Glucose.: 231 mg/dL (23 Oct 2019 12:06)  POCT Blood Glucose.: 165 mg/dL (23 Oct 2019 08:10)                          13.0   6.98  )-----------( 154      ( 23 Oct 2019 05:25 )             41.6         10-23    141  |  99  |  13  ----------------------------<  160<H>  4.2   |  30  |  0.7          LFTs:             6.7  | 0.8  | 27       ------------------[69      ( 23 Oct 2019 05:25 )  4.3  | x    | 35          Lipase:x      Amylase:x         Lactate, Blood: 2.1 mmol/L (10-22-19 @ 07:52)      Coags:     x      ----< x       ( 23 Oct 2019 21:29 )     74.2

## 2019-10-25 LAB
ANION GAP SERPL CALC-SCNC: 10 MMOL/L — SIGNIFICANT CHANGE UP (ref 7–14)
ANION GAP SERPL CALC-SCNC: 11 MMOL/L — SIGNIFICANT CHANGE UP (ref 7–14)
APTT BLD: 86.2 SEC — CRITICAL HIGH (ref 27–39.2)
BUN SERPL-MCNC: 12 MG/DL — SIGNIFICANT CHANGE UP (ref 10–20)
BUN SERPL-MCNC: 9 MG/DL — LOW (ref 10–20)
CALCIUM SERPL-MCNC: 8.7 MG/DL — SIGNIFICANT CHANGE UP (ref 8.5–10.1)
CALCIUM SERPL-MCNC: 9 MG/DL — SIGNIFICANT CHANGE UP (ref 8.5–10.1)
CHLORIDE SERPL-SCNC: 100 MMOL/L — SIGNIFICANT CHANGE UP (ref 98–110)
CHLORIDE SERPL-SCNC: 100 MMOL/L — SIGNIFICANT CHANGE UP (ref 98–110)
CK MB CFR SERPL CALC: 1.7 NG/ML — SIGNIFICANT CHANGE UP (ref 0.6–6.3)
CK SERPL-CCNC: 68 U/L — SIGNIFICANT CHANGE UP (ref 0–225)
CO2 SERPL-SCNC: 28 MMOL/L — SIGNIFICANT CHANGE UP (ref 17–32)
CO2 SERPL-SCNC: 29 MMOL/L — SIGNIFICANT CHANGE UP (ref 17–32)
CREAT SERPL-MCNC: 0.6 MG/DL — LOW (ref 0.7–1.5)
CREAT SERPL-MCNC: 0.7 MG/DL — SIGNIFICANT CHANGE UP (ref 0.7–1.5)
GLUCOSE BLDC GLUCOMTR-MCNC: 149 MG/DL — HIGH (ref 70–99)
GLUCOSE BLDC GLUCOMTR-MCNC: 155 MG/DL — HIGH (ref 70–99)
GLUCOSE SERPL-MCNC: 142 MG/DL — HIGH (ref 70–99)
GLUCOSE SERPL-MCNC: 144 MG/DL — HIGH (ref 70–99)
HCT VFR BLD CALC: 36.6 % — LOW (ref 42–52)
HCT VFR BLD CALC: 40 % — LOW (ref 42–52)
HGB BLD-MCNC: 11.5 G/DL — LOW (ref 14–18)
HGB BLD-MCNC: 12.1 G/DL — LOW (ref 14–18)
MAGNESIUM SERPL-MCNC: 1.7 MG/DL — LOW (ref 1.8–2.4)
MCHC RBC-ENTMCNC: 19.5 PG — LOW (ref 27–31)
MCHC RBC-ENTMCNC: 20 PG — LOW (ref 27–31)
MCHC RBC-ENTMCNC: 30.3 G/DL — LOW (ref 32–37)
MCHC RBC-ENTMCNC: 31.4 G/DL — LOW (ref 32–37)
MCV RBC AUTO: 63.8 FL — LOW (ref 80–94)
MCV RBC AUTO: 64.4 FL — LOW (ref 80–94)
NRBC # BLD: 0 /100 WBCS — SIGNIFICANT CHANGE UP (ref 0–0)
NRBC # BLD: 0 /100 WBCS — SIGNIFICANT CHANGE UP (ref 0–0)
PHOSPHATE SERPL-MCNC: 3.2 MG/DL — SIGNIFICANT CHANGE UP (ref 2.1–4.9)
PLATELET # BLD AUTO: 152 K/UL — SIGNIFICANT CHANGE UP (ref 130–400)
PLATELET # BLD AUTO: 159 K/UL — SIGNIFICANT CHANGE UP (ref 130–400)
POTASSIUM SERPL-MCNC: 3.9 MMOL/L — SIGNIFICANT CHANGE UP (ref 3.5–5)
POTASSIUM SERPL-MCNC: 4 MMOL/L — SIGNIFICANT CHANGE UP (ref 3.5–5)
POTASSIUM SERPL-SCNC: 3.9 MMOL/L — SIGNIFICANT CHANGE UP (ref 3.5–5)
POTASSIUM SERPL-SCNC: 4 MMOL/L — SIGNIFICANT CHANGE UP (ref 3.5–5)
RBC # BLD: 5.74 M/UL — SIGNIFICANT CHANGE UP (ref 4.7–6.1)
RBC # BLD: 6.21 M/UL — HIGH (ref 4.7–6.1)
RBC # FLD: 14.3 % — SIGNIFICANT CHANGE UP (ref 11.5–14.5)
RBC # FLD: 14.4 % — SIGNIFICANT CHANGE UP (ref 11.5–14.5)
SODIUM SERPL-SCNC: 138 MMOL/L — SIGNIFICANT CHANGE UP (ref 135–146)
SODIUM SERPL-SCNC: 140 MMOL/L — SIGNIFICANT CHANGE UP (ref 135–146)
TROPONIN T SERPL-MCNC: <0.01 NG/ML — SIGNIFICANT CHANGE UP
WBC # BLD: 7.53 K/UL — SIGNIFICANT CHANGE UP (ref 4.8–10.8)
WBC # BLD: 8.68 K/UL — SIGNIFICANT CHANGE UP (ref 4.8–10.8)
WBC # FLD AUTO: 7.53 K/UL — SIGNIFICANT CHANGE UP (ref 4.8–10.8)
WBC # FLD AUTO: 8.68 K/UL — SIGNIFICANT CHANGE UP (ref 4.8–10.8)

## 2019-10-25 PROCEDURE — 99222 1ST HOSP IP/OBS MODERATE 55: CPT

## 2019-10-25 PROCEDURE — 35656 BPG FEMORAL-POPLITEAL: CPT

## 2019-10-25 PROCEDURE — 71045 X-RAY EXAM CHEST 1 VIEW: CPT | Mod: 26

## 2019-10-25 RX ORDER — PANTOPRAZOLE SODIUM 20 MG/1
40 TABLET, DELAYED RELEASE ORAL
Refills: 0 | Status: DISCONTINUED | OUTPATIENT
Start: 2019-10-25 | End: 2019-10-29

## 2019-10-25 RX ORDER — INSULIN GLARGINE 100 [IU]/ML
18 INJECTION, SOLUTION SUBCUTANEOUS AT BEDTIME
Refills: 0 | Status: DISCONTINUED | OUTPATIENT
Start: 2019-10-25 | End: 2019-10-29

## 2019-10-25 RX ORDER — HEPARIN SODIUM 5000 [USP'U]/ML
5000 INJECTION INTRAVENOUS; SUBCUTANEOUS EVERY 8 HOURS
Refills: 0 | Status: DISCONTINUED | OUTPATIENT
Start: 2019-10-26 | End: 2019-10-29

## 2019-10-25 RX ORDER — ACETAMINOPHEN 500 MG
1000 TABLET ORAL ONCE
Refills: 0 | Status: DISCONTINUED | OUTPATIENT
Start: 2019-10-25 | End: 2019-10-26

## 2019-10-25 RX ORDER — LISINOPRIL 2.5 MG/1
20 TABLET ORAL DAILY
Refills: 0 | Status: DISCONTINUED | OUTPATIENT
Start: 2019-10-25 | End: 2019-10-29

## 2019-10-25 RX ORDER — HYDROMORPHONE HYDROCHLORIDE 2 MG/ML
1 INJECTION INTRAMUSCULAR; INTRAVENOUS; SUBCUTANEOUS
Refills: 0 | Status: DISCONTINUED | OUTPATIENT
Start: 2019-10-25 | End: 2019-10-25

## 2019-10-25 RX ORDER — INSULIN LISPRO 100/ML
VIAL (ML) SUBCUTANEOUS
Refills: 0 | Status: DISCONTINUED | OUTPATIENT
Start: 2019-10-25 | End: 2019-10-29

## 2019-10-25 RX ORDER — SODIUM CHLORIDE 9 MG/ML
1000 INJECTION, SOLUTION INTRAVENOUS
Refills: 0 | Status: DISCONTINUED | OUTPATIENT
Start: 2019-10-25 | End: 2019-10-25

## 2019-10-25 RX ORDER — ONDANSETRON 8 MG/1
4 TABLET, FILM COATED ORAL EVERY 6 HOURS
Refills: 0 | Status: DISCONTINUED | OUTPATIENT
Start: 2019-10-25 | End: 2019-10-29

## 2019-10-25 RX ORDER — CEFAZOLIN SODIUM 1 G
2000 VIAL (EA) INJECTION EVERY 8 HOURS
Refills: 0 | Status: COMPLETED | OUTPATIENT
Start: 2019-10-25 | End: 2019-10-26

## 2019-10-25 RX ORDER — SENNA PLUS 8.6 MG/1
2 TABLET ORAL DAILY
Refills: 0 | Status: DISCONTINUED | OUTPATIENT
Start: 2019-10-25 | End: 2019-10-29

## 2019-10-25 RX ORDER — METOPROLOL TARTRATE 50 MG
50 TABLET ORAL
Refills: 0 | Status: DISCONTINUED | OUTPATIENT
Start: 2019-10-25 | End: 2019-10-29

## 2019-10-25 RX ORDER — SODIUM CHLORIDE 9 MG/ML
1000 INJECTION INTRAMUSCULAR; INTRAVENOUS; SUBCUTANEOUS
Refills: 0 | Status: DISCONTINUED | OUTPATIENT
Start: 2019-10-25 | End: 2019-10-26

## 2019-10-25 RX ORDER — OXYCODONE HYDROCHLORIDE 5 MG/1
10 TABLET ORAL EVERY 6 HOURS
Refills: 0 | Status: DISCONTINUED | OUTPATIENT
Start: 2019-10-25 | End: 2019-10-29

## 2019-10-25 RX ORDER — CHLORHEXIDINE GLUCONATE 213 G/1000ML
1 SOLUTION TOPICAL
Refills: 0 | Status: DISCONTINUED | OUTPATIENT
Start: 2019-10-25 | End: 2019-10-29

## 2019-10-25 RX ORDER — ATORVASTATIN CALCIUM 80 MG/1
80 TABLET, FILM COATED ORAL AT BEDTIME
Refills: 0 | Status: DISCONTINUED | OUTPATIENT
Start: 2019-10-25 | End: 2019-10-29

## 2019-10-25 RX ORDER — SODIUM CHLORIDE 9 MG/ML
3 INJECTION INTRAMUSCULAR; INTRAVENOUS; SUBCUTANEOUS EVERY 8 HOURS
Refills: 0 | Status: DISCONTINUED | OUTPATIENT
Start: 2019-10-25 | End: 2019-10-29

## 2019-10-25 RX ORDER — ACETAMINOPHEN 500 MG
650 TABLET ORAL EVERY 6 HOURS
Refills: 0 | Status: DISCONTINUED | OUTPATIENT
Start: 2019-10-25 | End: 2019-10-26

## 2019-10-25 RX ORDER — OXYCODONE HYDROCHLORIDE 5 MG/1
5 TABLET ORAL EVERY 4 HOURS
Refills: 0 | Status: DISCONTINUED | OUTPATIENT
Start: 2019-10-25 | End: 2019-10-29

## 2019-10-25 RX ORDER — ONDANSETRON 8 MG/1
4 TABLET, FILM COATED ORAL ONCE
Refills: 0 | Status: DISCONTINUED | OUTPATIENT
Start: 2019-10-25 | End: 2019-10-26

## 2019-10-25 RX ORDER — HYDROMORPHONE HYDROCHLORIDE 2 MG/ML
0.5 INJECTION INTRAMUSCULAR; INTRAVENOUS; SUBCUTANEOUS
Refills: 0 | Status: DISCONTINUED | OUTPATIENT
Start: 2019-10-25 | End: 2019-10-25

## 2019-10-25 RX ADMIN — HYDROMORPHONE HYDROCHLORIDE 1 MILLIGRAM(S): 2 INJECTION INTRAMUSCULAR; INTRAVENOUS; SUBCUTANEOUS at 19:00

## 2019-10-25 RX ADMIN — Medication 50 MILLIGRAM(S): at 19:10

## 2019-10-25 RX ADMIN — Medication 50 MILLIGRAM(S): at 05:39

## 2019-10-25 RX ADMIN — HYDROMORPHONE HYDROCHLORIDE 1 MILLIGRAM(S): 2 INJECTION INTRAMUSCULAR; INTRAVENOUS; SUBCUTANEOUS at 18:32

## 2019-10-25 RX ADMIN — OXYCODONE HYDROCHLORIDE 5 MILLIGRAM(S): 5 TABLET ORAL at 20:30

## 2019-10-25 RX ADMIN — LISINOPRIL 20 MILLIGRAM(S): 2.5 TABLET ORAL at 05:39

## 2019-10-25 RX ADMIN — SODIUM CHLORIDE 3 MILLILITER(S): 9 INJECTION INTRAMUSCULAR; INTRAVENOUS; SUBCUTANEOUS at 22:07

## 2019-10-25 RX ADMIN — OXYCODONE HYDROCHLORIDE 5 MILLIGRAM(S): 5 TABLET ORAL at 21:00

## 2019-10-25 RX ADMIN — PANTOPRAZOLE SODIUM 40 MILLIGRAM(S): 20 TABLET, DELAYED RELEASE ORAL at 21:54

## 2019-10-25 RX ADMIN — SODIUM CHLORIDE 100 MILLILITER(S): 9 INJECTION INTRAMUSCULAR; INTRAVENOUS; SUBCUTANEOUS at 00:05

## 2019-10-25 RX ADMIN — ATORVASTATIN CALCIUM 80 MILLIGRAM(S): 80 TABLET, FILM COATED ORAL at 21:54

## 2019-10-25 RX ADMIN — Medication 100 MILLIGRAM(S): at 21:54

## 2019-10-25 RX ADMIN — INSULIN GLARGINE 18 UNIT(S): 100 INJECTION, SOLUTION SUBCUTANEOUS at 21:54

## 2019-10-25 RX ADMIN — SODIUM CHLORIDE 100 MILLILITER(S): 9 INJECTION INTRAMUSCULAR; INTRAVENOUS; SUBCUTANEOUS at 18:37

## 2019-10-25 NOTE — CONSULT NOTE ADULT - ASSESSMENT
Assessment & Plan    64y Male with PMHx/PSHx of CAD/PVD s/p PCI in 2009 x2 stents (f/u with Dr. Bucio), DM, HLD, HTN, COPD, anxiety, and smoker 50 pack years s/p s/p LLE femoral to above knee popliteal artery bypass with PTFE upgraded to SICU for q1 vascular checks.     NEURO:   Pain-controlled with     RESP:   RR: 13 (10-25) (12 - 18)  SpO2: 94% (10-25) (94% - 94%)      CARDS:       GI/NUTR:   Diet, Clear Liquid      /RENAL:   -Cr     HEME/ONC:   -H/H 40.0 % (10-25-19)  12.1 g/dL (10-25-19)      ID:   -Afebrile T(C): 36.9 (10-25 @ 17:35), Max: 36.9 (10-25 @ 17:35)  - WBC Count: 7.53 (10-25 @ 05:24)  WBC Count: 10.31 (10-24 @ 12:29)        ENDO:  -Glu 149 mg/dL (10-25 @ 07:52)  144 mg/dL (10-25 @ 05:24)      LINES/DRAINS: CEASAR, Claudia Luna     DISPO: SICU Assessment & Plan    64y Male with PMHx/PSHx of CAD/PVD s/p PCI in 2009 x2 stents (f/u with Dr. Bucio), DM, HLD, HTN, COPD, anxiety, and smoker 50 pack years s/p s/p LLE femoral to above knee popliteal artery bypass with PTFE upgraded to SICU for q1 vascular checks.     NEURO:   Pain-controlled with Tylenol 1g IV x1, Dilaudid PRN  Nausea: Zofran    RESP:   Hx of COPD, and 50 pack year smoking history  Satting >92% on NC    CARDS:   HTN: On Lisinopril 20 QD and metroprolol 50 BID  HLD: Atorvastatin 80 QHS  F/u CE x3  EKG unchanged from preop    GI/NUTR:   Diet, Clear Liquid  Bowel regimen: Senna  PPI    /RENAL:  NS @ 100 cc/hr (Stop once tolerating CLD)   Taylor in place  Cr 0.7 BUN 12  F/u BMP and lytes, replete as needed  No dx    HEME/ONC:   H/H stable at 12/40 > 11.5/36  HSQ    ID:   Afebrile. No leukocytosis  Periop Ancef 2 g intraop, 2 more doses left    ENDO:  DM on ISS w FS Q AC/HS  POCT 149    LINES/DRAINS: Cheryl MCDONOUGH Foley     DISPO: SICU Assessment & Plan    64y Male with PMHx/PSHx of CAD/PVD s/p PCI in 2009 x2 stents (f/u with Dr. Bucio), DM, HLD, HTN, COPD, anxiety, and smoker 50 pack years s/p s/p LLE femoral to above knee popliteal artery bypass with PTFE upgraded to SICU for q1 vascular checks.     NEURO:   Pain-controlled with Tylenol 1g IV x1, Oxy PRN, Tylenol PRN  Nausea: Zofran    RESP:   Hx of COPD, and 50 pack year smoking history  Satting >92% on NC    CARDS:   HTN: On Lisinopril 20 QD and metroprolol 50 BID  HLD: Atorvastatin 80 QHS  F/u CE x3  EKG unchanged from preop    GI/NUTR:   Diet, Clear Liquid  Bowel regimen: Senna  PPI    /RENAL:  NS @ 100 cc/hr (Stop once tolerating CLD)   Taylor in place  Cr 0.7 BUN 12  F/u BMP and lytes, replete as needed  No dx    HEME/ONC:   H/H stable at 12/40 > 11.5/36  HSQ    ID:   Afebrile. No leukocytosis  Periop Ancef 2 g intraop, 2 more doses left    ENDO:  DM on ISS w FS Q AC/HS  POCT 149    LINES/DRAINS: Cheryl MCDONOUGH Foley     DISPO: SICU

## 2019-10-25 NOTE — CONSULT NOTE ADULT - CONSULT REASON
s/p LLE femoral to above knee popliteal artery bypass with PTFE upgraded to SICU for q1 vascular checks

## 2019-10-25 NOTE — CONSULT NOTE ADULT - ATTENDING COMMENTS
I discussed this patient with the PA and resident over the phone and made all critical care decisions.  The next day I performed my own exam and confirmed the above findings.    the patient is comorbid and is at high risk of cardiovascular deterioration  he has post-operative pain that will be treated with oral agents

## 2019-10-25 NOTE — PROGRESS NOTE ADULT - SUBJECTIVE AND OBJECTIVE BOX
Patient was seen and examined. Spoke with RN. Chart reviewed.    No events overnight.  Vital Signs Last 24 Hrs  T(F): 97.5 (25 Oct 2019 09:46), Max: 97.6 (24 Oct 2019 15:30)  HR: 69 (25 Oct 2019 09:46) (69 - 86)  BP: 132/67 (25 Oct 2019 09:46) (120/58 - 140/69)  SpO2: --  MEDICATIONS  (STANDING):  aspirin enteric coated 81 milliGRAM(s) Oral daily  atorvastatin 80 milliGRAM(s) Oral at bedtime  chlorhexidine 4% Liquid 1 Application(s) Topical <User Schedule>  dextrose 5%. 1000 milliLiter(s) (50 mL/Hr) IV Continuous <Continuous>  dextrose 50% Injectable 12.5 Gram(s) IV Push once  dextrose 50% Injectable 25 Gram(s) IV Push once  dextrose 50% Injectable 25 Gram(s) IV Push once  heparin  Infusion 1300 Unit(s)/Hr (13 mL/Hr) IV Continuous <Continuous>  insulin glargine Injectable (LANTUS) 18 Unit(s) SubCutaneous at bedtime  insulin lispro (HumaLOG) corrective regimen sliding scale   SubCutaneous three times a day before meals  insulin lispro Injectable (HumaLOG) 6 Unit(s) SubCutaneous three times a day before meals  lisinopril 20 milliGRAM(s) Oral daily  metoprolol tartrate 50 milliGRAM(s) Oral two times a day  sodium chloride 0.9%. 1000 milliLiter(s) (100 mL/Hr) IV Continuous <Continuous>    MEDICATIONS  (PRN):  dextrose 40% Gel 15 Gram(s) Oral once PRN Blood Glucose LESS THAN 70 milliGRAM(s)/deciliter  glucagon  Injectable 1 milliGRAM(s) IntraMuscular once PRN Glucose LESS THAN 70 milligrams/deciliter  heparin  Injectable 6500 Unit(s) IV Push every 6 hours PRN For aPTT less than 40  heparin  Injectable 3000 Unit(s) IV Push every 6 hours PRN For aPTT between 40 - 57    Labs:                        12.1   7.53  )-----------( 159      ( 25 Oct 2019 05:24 )             40.0                         12.8   10.31 )-----------( 175      ( 24 Oct 2019 12:29 )             42.2     25 Oct 2019 05:24    140    |  100    |  12     ----------------------------<  144    3.9     |  29     |  0.7    24 Oct 2019 12:29    140    |  98     |  12     ----------------------------<  188    4.1     |  31     |  0.6      Ca    9.0        25 Oct 2019 05:24  Ca    9.7        24 Oct 2019 12:29      PT/INR - ( 24 Oct 2019 12:29 )   PT: 12.30 sec;   INR: 1.07 ratio         PTT - ( 25 Oct 2019 05:24 )  PTT:86.2 sec        Radiology:    General: comfortable, NAD  Neurology: A&Ox3, nonfocal  Head:  Normocephalic, atraumatic  ENT:  Mucosa moist, no ulcerations  Neck:  Supple, no JVD,   Skin: no breakdowns (as per RN)  Resp: CTA B/L  CV: RRR, S1S2,   GI: Soft, NT, bowel sounds  MS: No edema, + peripheral pulses, FROM all 4 extremity

## 2019-10-25 NOTE — PROGRESS NOTE ADULT - SUBJECTIVE AND OBJECTIVE BOX
SUBJ:  Patient seen and examined.  Spoke to vascular team and house staffs in detail regarding stress test results.    MEDICATIONS  (STANDING):  aspirin enteric coated 81 milliGRAM(s) Oral daily  atorvastatin 80 milliGRAM(s) Oral at bedtime  chlorhexidine 4% Liquid 1 Application(s) Topical <User Schedule>  dextrose 5%. 1000 milliLiter(s) (50 mL/Hr) IV Continuous <Continuous>  dextrose 50% Injectable 12.5 Gram(s) IV Push once  dextrose 50% Injectable 25 Gram(s) IV Push once  dextrose 50% Injectable 25 Gram(s) IV Push once  heparin  Infusion 1300 Unit(s)/Hr (13 mL/Hr) IV Continuous <Continuous>  insulin glargine Injectable (LANTUS) 18 Unit(s) SubCutaneous at bedtime  insulin lispro (HumaLOG) corrective regimen sliding scale   SubCutaneous three times a day before meals  insulin lispro Injectable (HumaLOG) 6 Unit(s) SubCutaneous three times a day before meals  lisinopril 20 milliGRAM(s) Oral daily  metoprolol tartrate 50 milliGRAM(s) Oral two times a day  sodium chloride 0.9%. 1000 milliLiter(s) (100 mL/Hr) IV Continuous <Continuous>    MEDICATIONS  (PRN):  dextrose 40% Gel 15 Gram(s) Oral once PRN Blood Glucose LESS THAN 70 milliGRAM(s)/deciliter  glucagon  Injectable 1 milliGRAM(s) IntraMuscular once PRN Glucose LESS THAN 70 milligrams/deciliter  heparin  Injectable 6500 Unit(s) IV Push every 6 hours PRN For aPTT less than 40  heparin  Injectable 3000 Unit(s) IV Push every 6 hours PRN For aPTT between 40 - 57            Vital Signs Last 24 Hrs  T(C): 36.4 (25 Oct 2019 09:46), Max: 36.4 (24 Oct 2019 15:30)  T(F): 97.5 (25 Oct 2019 09:46), Max: 97.6 (24 Oct 2019 15:30)  HR: 69 (25 Oct 2019 09:46) (69 - 86)  BP: 132/67 (25 Oct 2019 09:46) (120/58 - 140/69)  BP(mean): --  RR: 18 (25 Oct 2019 09:46) (18 - 18)  SpO2: --      PHYSICAL EXAM:  · CONSTITUTIONAL:	Well-developed, well nourished    BMI-  ·RESPIRATORY:   airway patent; breath sounds equal; good air movement; respirations non-labored; clear to auscultation bilaterally; no chest wall tenderness; no intercostal retractions; no rales, rhonchi or wheeze  · CARDIOVASCULAR	regular rate and rhythm  no rub  no murmur  normal PMI  · EXTREMITIES: poor distal pulses   · VASCULAR: 	Equal and normal pulses (carotid, femoral, dorsalis pedis)  	  TELEMETRY:    ECG:    TTE:    LABS:                        12.1   7.53  )-----------( 159      ( 25 Oct 2019 05:24 )             40.0     10-25    140  |  100  |  12  ----------------------------<  144<H>  3.9   |  29  |  0.7    Ca    9.0      25 Oct 2019 05:24          PT/INR - ( 24 Oct 2019 12:29 )   PT: 12.30 sec;   INR: 1.07 ratio         PTT - ( 25 Oct 2019 05:24 )  PTT:86.2 sec    I&O's Summary    24 Oct 2019 07:01  -  25 Oct 2019 07:00  --------------------------------------------------------  IN: 843 mL / OUT: 0 mL / NET: 843 mL      BNP  RADIOLOGY & ADDITIONAL STUDIES:    IMPRESSION AND PLAN:    1) Left lower extremity arterial insufficiency.        Duplex demonstrates occlusion of left superficial femoral artery with diminished flow distally and high grade stenosis of left profunda femoris.        F/U vascular regarding heparin drip post -op    2)CAD - hx of stents without recurrence of angina.  Pt has been non compliant with medications such as ASA, statins.      Stress test revealed mild to moderate  ischemia involving the lateral wall, he has chronic  total occlusion of LCX by previous cardiac cath, due to semiurgent   nature  of lower extremity ischemia he can proceed with vascular surgery at elevated risk.     Continue ASA, beta blocker and statin.     Post-op ICU monitoring, if he develop any symptoms obtain EKG, no need for routine troponin measurements post-op.    3)Type 2 DM,      Monitor blood sugar    4) COPD.

## 2019-10-25 NOTE — CHART NOTE - NSCHARTNOTEFT_GEN_A_CORE
PACU ANESTHESIA ADMISSION NOTE      Procedure:   Post op diagnosis:      ____  Intubated  TV:______       Rate: ______      FiO2: ______    __x__  Patent Airway    ___x_  Full return of protective reflexes    ____  Full recovery from anesthesia / back to baseline     Vitals:   T:  98.7F         R: 16                 BP: 129/58                 Sat:95                   P: 93      Mental Status:  __x__ Awake   _____ Alert   _____ Drowsy   _____ Sedated    Nausea/Vomiting:  __x__ NO  ______Yes,   See Post - Op Orders          Pain Scale (0-10):  ___0__    Treatment: ____ None    ____ See Post - Op/PCA Orders    Post - Operative Fluids:   ____ Oral   __x__ See Post - Op Orders    Plan: Discharge:   ____Home       _____Floor     __x___Critical Care    _____  Other:_________________    Comments: Pt awake, VS stable, no anesthesia complications. Report given to ICU TIESHA Albright.

## 2019-10-25 NOTE — PROGRESS NOTE ADULT - SUBJECTIVE AND OBJECTIVE BOX
GENERAL SURGERY PROGRESS NOTE     PORFIRIO HERRERA  64y  Male  Hospital day :3d  POD:  Procedure:   OVERNIGHT EVENTS: no acute events. Patient pre-op'd, heparin drip stopped at 6am. OR today.     T(F): 96.6 (10-24-19 @ 23:00), Max: 97.6 (10-24-19 @ 15:30)  HR: 77 (10-25-19 @ 04:39) (72 - 86)  BP: 140/69 (10-25-19 @ 04:39) (120/58 - 140/69)  ABP: --  ABP(mean): --  RR: 18 (10-25-19 @ 04:39) (18 - 18)  SpO2: --    DIET/FLUIDS: dextrose 5%. 1000 milliLiter(s) IV Continuous <Continuous>  sodium chloride 0.9%. 1000 milliLiter(s) IV Continuous <Continuous>       GI proph:    AC/ proph: aspirin enteric coated 81 milliGRAM(s) Oral daily  heparin  Infusion 1300 Unit(s)/Hr IV Continuous <Continuous>  heparin  Injectable 6500 Unit(s) IV Push every 6 hours PRN  heparin  Injectable 3000 Unit(s) IV Push every 6 hours PRN    ABx:     PHYSICAL EXAM:  GENERAL: NAD, well-appearing  CHEST/LUNG: Clear to auscultation bilaterally  HEART: Regular rate and rhythm  ABDOMEN: Soft, Nontender, Nondistended;   EXTREMITIES:  No clubbing, cyanosis, or edema      LABS  Labs:  CAPILLARY BLOOD GLUCOSE      POCT Blood Glucose.: 149 mg/dL (25 Oct 2019 07:52)  POCT Blood Glucose.: 213 mg/dL (24 Oct 2019 21:10)  POCT Blood Glucose.: 98 mg/dL (24 Oct 2019 17:01)  POCT Blood Glucose.: 197 mg/dL (24 Oct 2019 12:38)  POCT Blood Glucose.: 204 mg/dL (24 Oct 2019 11:24)                          12.1   7.53  )-----------( 159      ( 25 Oct 2019 05:24 )             40.0         10-25    140  |  100  |  12  ----------------------------<  144<H>  3.9   |  29  |  0.7      Calcium, Total Serum: 9.0 mg/dL (10-25-19 @ 05:24)           Coags:     x      ----< x       ( 25 Oct 2019 05:24 )     86.2          RADIOLOGY & ADDITIONAL TESTS:    < from: NM Nuclear Stress Pharmacologic Multiple (10.24.19 @ 11:12) >  Impression:   1. Moderate-size reversible defect in the inferolateral wall of the left   ventricle consistent with ischemia.    2. Normal left ventricular wall motion and wall thickening.    3. Calculated left ventricular ejection fraction of 48 % which is below   the lower limits of normal of 50%. Wall motion analysis suggests ejection   fraction of 50-55%.Left ventricular ejection fraction estimated from   echocardiogram dated 10/22/2019 was 55-60%.    < end of copied text >

## 2019-10-25 NOTE — CONSULT NOTE ADULT - SUBJECTIVE AND OBJECTIVE BOX
SICU Consultation Note  ======  64y Male with PMHx/PSHx of CAD/PVD s/p PCI in 2009 x2 stents (f/u with Dr. Bucio), DM, HLD, HTN, COPD, anxiety, and smoker 50 pack years s/p s/p LLE femoral to above knee popliteal artery bypass with PTFE upgraded to SICU for q1 vascular checks.  He was admitted through the ED 10/22 for worsening left foot numbness, dusky discoloration, coldness and pain x2 weeks which worsened in the last 4 days. He could only walk 1 block. CTA of abd/aorta w/runoff showed complete occlusion of L superficial femoral artery at level below the femoral neck, extending to level of the mid thigh.  The popliteal artery was patent with intact two vessel runoff to the left ankle and foot. There was severely diseased anterior tibial artery at the level midshin down to the ankle although DP artery is patent.  Patient was monitored on the floor between 10/22/ and 10/25 getting pre-op workup, placed on heparin gtt, and found to have positive stress test.     OR time: 3 hr     EBL: 50cc         IV Fluids: 2800      Blood Products:  None              UOP: 250cc    Procedure Findings-  · Operative Findings	Left lower limb Femoral to above knee popliteal artery bypass with PTFE	    < from: CT Angio Abd Aorta w/run-off w/ IV Cont (10.22.19 @ 15:57) >      IMPRESSION:    1.  Mild abdominal aortic atherosclerotic disease. Mild narrowing at the   celiac artery ostium, with severe narrowing of proximal splenic artery.   Bilateral renal arteries are grossly patent.    2.  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 The distal right external iliac artery, common femoral artery,   SFA/profunda femoris arteries, and popliteal artery are patent. Intact   three-vessel runoff to the right ankle and foot.    2.  Left lower extremity: Severe tandem disease of the left internal   iliac artery. The left external iliac and common femoral artery are   patent. There is complete occlusion of the left superficial femoral   artery at the level below the femoral neck, extending to the level of the   mid thigh. There is subsequent reconstitution of flow. The left popliteal   artery is patent, with intact two-vessel runoff to the left ankle and   foot. Severely diseased anterior tibialartery at the level midshin down   to the ankle, dorsalis pedis artery is patent.    4.  Thickened bilateral adrenal glands, with fatty components concerning   for bilateral adrenal adenomas. Nonurgent laboratory workup is   recommended.      Dr. Longsed findings with referring clinicians via telephone at   5:54pm with read-back.    < end of copied text >  shows.  On the floor he was placed on heparin gtt. Had positive stress test        PAST MEDICAL & SURGICAL HISTORY:  CAD (coronary artery disease)  Dyslipidemia  Diabetes  HTN (hypertension)  History of coronary artery stent placement: x 2 stents      Home Meds:   Home Medications:  atorvastatin 20 mg oral tablet: 1 tab(s) orally once a day (22 Oct 2019 14:06)  lisinopril 20 mg oral tablet: 1 tab(s) orally once a day (22 Oct 2019 14:06)  metFORMIN 850 mg oral tablet: orally every 12 hours (22 Oct 2019 14:06)  metoprolol: 50 milligram(s) orally 2 times a day (22 Oct 2019 14:06)      Allergies    No Known Allergies    Intolerances          Advanced Directives: Full Code      CURRENT MEDICATIONS:   --------------------------------------------------------------------------------------  Neurologic Medications  acetaminophen  IVPB .. 1000 milliGRAM(s) IV Intermittent once  HYDROmorphone  Injectable 0.5 milliGRAM(s) IV Push every 10 minutes PRN Moderate Pain (4 - 6)  HYDROmorphone  Injectable 1 milliGRAM(s) IV Push every 10 minutes PRN Severe Pain (7 - 10)  ondansetron Injectable 4 milliGRAM(s) IV Push every 6 hours PRN Nausea  ondansetron Injectable 4 milliGRAM(s) IV Push once PRN Nausea and/or Vomiting    Respiratory Medications    Cardiovascular Medications  lisinopril 20 milliGRAM(s) Oral daily  metoprolol tartrate 50 milliGRAM(s) Oral two times a day    Gastrointestinal Medications  senna 2 Tablet(s) Oral daily PRN Constipation  sodium chloride 0.9% lock flush 3 milliLiter(s) IV Push every 8 hours  sodium chloride 0.9%. 1000 milliLiter(s) IV Continuous <Continuous>    Genitourinary Medications    Hematologic/Oncologic Medications    Antimicrobial/Immunologic Medications  ceFAZolin   IVPB 2000 milliGRAM(s) IV Intermittent every 8 hours    Endocrine/Metabolic Medications  atorvastatin 80 milliGRAM(s) Oral at bedtime  insulin glargine Injectable (LANTUS) 18 Unit(s) SubCutaneous at bedtime  insulin lispro (HumaLOG) corrective regimen sliding scale   SubCutaneous three times a day before meals    Topical/Other Medications  chlorhexidine 4% Liquid 1 Application(s) Topical <User Schedule>          VITAL SIGNS, INS/OUTS (Last 24hours):    ICU Vital Signs Last 24 Hrs  T(C): 36.9 (25 Oct 2019 17:35), Max: 36.9 (25 Oct 2019 17:35)  T(F): 98.5 (25 Oct 2019 17:35), Max: 98.5 (25 Oct 2019 17:35)  HR: 84 (25 Oct 2019 18:05) (69 - 90)  BP: 132/67 (25 Oct 2019 12:59) (124/67 - 140/69)  BP(mean): --  ABP: 156/70 (25 Oct 2019 18:05) (126/62 - 156/70)  ABP(mean): 98 (25 Oct 2019 18:05) (86 - 98)  RR: 16 (25 Oct 2019 18:05) (12 - 18)  SpO2: 94% (25 Oct 2019 17:40) (94% - 94%)    I&O's Summary    24 Oct 2019 07:01  -  25 Oct 2019 07:00  --------------------------------------------------------  IN: 843 mL / OUT: 0 mL / NET: 843 mL          Height (cm): 177.8 (10-25-19)  Weight (kg): 83.9 (10-25-19)  BMI (kg/m2): 26.5 (10-25-19)  BSA (m2): 2.02 (10-25-19)    Physical Exam:  ---------------------------------------------------------------------------------------  NEURO:  Exam: A&Ox3, no focal deficits    RESPIRATORY:  Lungs clear to auscultation b/l, Normal expansion/effort  Barrel chested    CARDIOVASCULAR:  S1/S2.  RRR. Normotensive. Nontachycardic.  No peripheral edema    VASCULAR:  Wound: Left groin dressing c/d/i. No hematoma. Left medial knee dressing c/d/i.  Palpable DP/PT on RLE. Palpable PT on LLE, strongly dopplerable DP on LLE.     GASTROINTESTINAL:  Abdomen soft, non-tender, non-distended, no wounds or discoloration    MUSCULOSKELETAL:  Extremities warm, perfused.   Palpable DP/PT on RLE. Palpable PT on LLE, dopplerable DP on LLE.     DERM:  No skin breakdown     :   Taylor catheter in place.       Tubes/Lines/Drains   ----------------------------------------------------------------------------------------------------------  [x] Peripheral IV  [x] Arterial Line		   L         Radial    Date Placed: 10/25/19  [x] Urinary Catheter Taylor                                             Date Placed: 10/25/19    LABS  --------------------------------------------------------------------------------------  PT/INR - ( 24 Oct 2019 12:29 )   PT: 12.30 sec;   INR: 1.07 ratio         PTT - ( 25 Oct 2019 05:24 )  PTT:86.2 sec    CAPILLARY BLOOD GLUCOSE    POCT Blood Glucose.: 149 mg/dL (25 Oct 2019 07:52)  POCT Blood Glucose.: 213 mg/dL (24 Oct 2019 21:10)    CT/XRAY/ECHO/TCD/EEG  ----------------------------------------------------------------------------------------------  --------------------------------------------------------------------------------------  Admit Diagnosis: VASCULAR OCCLUSION      Critical Care Diagnoses: SICU Consultation Note  ======  64y Male with PMHx/PSHx of CAD/PVD s/p PCI in 2009 x2 stents (f/u with Dr. Bucio), DM, HLD, HTN, COPD, anxiety, and smoker 50 pack years s/p s/p LLE femoral to above knee popliteal artery bypass with PTFE upgraded to SICU for q1 vascular checks.  As as side note, patient had cervical herniated disc that he received cortisone injections for, with last dose in September. He was admitted through the ED 10/22 for worsening left foot numbness, dusky discoloration, coldness and pain x2 weeks which worsened in the last 4 days. He could only walk 1 block. CTA of abd/aorta w/runoff showed complete occlusion of L superficial femoral artery at level below the femoral neck, extending to level of the mid thigh.  The popliteal artery was patent with intact two vessel runoff to the left ankle and foot. There was severely diseased anterior tibial artery at the level midshin down to the ankle although DP artery is patent.  Patient was monitored on the floor between 10/22/ and 10/25 getting pre-op workup, placed on heparin gtt, and found to have positive stress test. Intraop, patient was given 400 mcg Fentanyl, 5000 u Heparin, not reversed and 2 g of Ancef, 4 of Decadron.  He was hypotensive intraop due to sedation and had a 1hr course of deshaun post induction. Patient was seen and evaluated at bedside in the SICU and found to be normotensive, nontachycardic, in NAD.     OR time: 3 hr     EBL: 50cc         IV Fluids: 2800      Blood Products:  None              UOP: 250cc    Procedure Findings-  · Operative Findings	Left lower limb Femoral to above knee popliteal artery bypass with PTFE	    < from: CT Angio Abd Aorta w/run-off w/ IV Cont (10.22.19 @ 15:57) >      IMPRESSION:    1.  Mild abdominal aortic atherosclerotic disease. Mild narrowing at the   celiac artery ostium, with severe narrowing of proximal splenic artery.   Bilateral renal arteries are grossly patent.    2.  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 The distal right external iliac artery, common femoral artery,   SFA/profunda femoris arteries, and popliteal artery are patent. Intact   three-vessel runoff to the right ankle and foot.    2.  Left lower extremity: Severe tandem disease of the left internal   iliac artery. The left external iliac and common femoral artery are   patent. There is complete occlusion of the left superficial femoral   artery at the level below the femoral neck, extending to the level of the   mid thigh. There is subsequent reconstitution of flow. The left popliteal   artery is patent, with intact two-vessel runoff to the left ankle and   foot. Severely diseased anterior tibialartery at the level midshin down   to the ankle, dorsalis pedis artery is patent.    4.  Thickened bilateral adrenal glands, with fatty components concerning   for bilateral adrenal adenomas. Nonurgent laboratory workup is   recommended.      Dr. Henrycussed findings with referring clinicians via telephone at   5:54pm with read-back.    < end of copied text >  shows.  On the floor he was placed on heparin gtt. Had positive stress test        PAST MEDICAL & SURGICAL HISTORY:  CAD (coronary artery disease)  Dyslipidemia  Diabetes  HTN (hypertension)  History of coronary artery stent placement: x 2 stents      Home Meds:   Home Medications:  atorvastatin 20 mg oral tablet: 1 tab(s) orally once a day (22 Oct 2019 14:06)  lisinopril 20 mg oral tablet: 1 tab(s) orally once a day (22 Oct 2019 14:06)  metFORMIN 850 mg oral tablet: orally every 12 hours (22 Oct 2019 14:06)  metoprolol: 50 milligram(s) orally 2 times a day (22 Oct 2019 14:06)    Allergies    No Known Allergies    Intolerances    Advanced Directives: Full Code    CURRENT MEDICATIONS:   --------------------------------------------------------------------------------------  Neurologic Medications  acetaminophen  IVPB .. 1000 milliGRAM(s) IV Intermittent once  HYDROmorphone  Injectable 0.5 milliGRAM(s) IV Push every 10 minutes PRN Moderate Pain (4 - 6)  HYDROmorphone  Injectable 1 milliGRAM(s) IV Push every 10 minutes PRN Severe Pain (7 - 10)  ondansetron Injectable 4 milliGRAM(s) IV Push every 6 hours PRN Nausea  ondansetron Injectable 4 milliGRAM(s) IV Push once PRN Nausea and/or Vomiting    Respiratory Medications    Cardiovascular Medications  lisinopril 20 milliGRAM(s) Oral daily  metoprolol tartrate 50 milliGRAM(s) Oral two times a day    Gastrointestinal Medications  senna 2 Tablet(s) Oral daily PRN Constipation  sodium chloride 0.9% lock flush 3 milliLiter(s) IV Push every 8 hours  sodium chloride 0.9%. 1000 milliLiter(s) IV Continuous <Continuous>    Genitourinary Medications    Hematologic/Oncologic Medications    Antimicrobial/Immunologic Medications  ceFAZolin   IVPB 2000 milliGRAM(s) IV Intermittent every 8 hours    Endocrine/Metabolic Medications  atorvastatin 80 milliGRAM(s) Oral at bedtime  insulin glargine Injectable (LANTUS) 18 Unit(s) SubCutaneous at bedtime  insulin lispro (HumaLOG) corrective regimen sliding scale   SubCutaneous three times a day before meals    Topical/Other Medications  chlorhexidine 4% Liquid 1 Application(s) Topical <User Schedule>    VITAL SIGNS, INS/OUTS (Last 24hours):    ICU Vital Signs Last 24 Hrs  T(C): 36.9 (25 Oct 2019 17:35), Max: 36.9 (25 Oct 2019 17:35)  T(F): 98.5 (25 Oct 2019 17:35), Max: 98.5 (25 Oct 2019 17:35)  HR: 84 (25 Oct 2019 18:05) (69 - 90)  BP: 132/67 (25 Oct 2019 12:59) (124/67 - 140/69)  BP(mean): --  ABP: 156/70 (25 Oct 2019 18:05) (126/62 - 156/70)  ABP(mean): 98 (25 Oct 2019 18:05) (86 - 98)  RR: 16 (25 Oct 2019 18:05) (12 - 18)  SpO2: 94% (25 Oct 2019 17:40) (94% - 94%)    I&O's Summary    24 Oct 2019 07:01  -  25 Oct 2019 07:00  --------------------------------------------------------  IN: 843 mL / OUT: 0 mL / NET: 843 mL    Height (cm): 177.8 (10-25-19)  Weight (kg): 83.9 (10-25-19)  BMI (kg/m2): 26.5 (10-25-19)  BSA (m2): 2.02 (10-25-19)    Physical Exam:  ---------------------------------------------------------------------------------------  NEURO:  Exam: A&Ox3, no focal deficits    RESPIRATORY:  Lungs clear to auscultation b/l, Normal expansion/effort  Barrel chested    CARDIOVASCULAR:  S1/S2.  RRR. Normotensive. Nontachycardic.  No peripheral edema    VASCULAR:  Wound: Left groin dressing c/d/i. No hematoma. Left medial knee dressing c/d/i.  Palpable DP/PT on RLE. Palpable PT on LLE, strongly dopplerable DP on LLE.     GASTROINTESTINAL:  Abdomen soft, non-tender, non-distended, no wounds or discoloration    MUSCULOSKELETAL:  Extremities warm, perfused.   Palpable DP/PT on RLE. Palpable PT on LLE, dopplerable DP on LLE.     DERM:  No skin breakdown     :   Taylor catheter in place.     Tubes/Lines/Drains   ----------------------------------------------------------------------------------------------------------  [x] Peripheral IV  [x] Arterial Line		   L         Radial    Date Placed: 10/25/19  [x] Urinary Catheter Taylor                                             Date Placed: 10/25/19    LABS  --------------------------------------------------------------------------------------  PT/INR - ( 24 Oct 2019 12:29 )   PT: 12.30 sec;   INR: 1.07 ratio       PTT - ( 25 Oct 2019 05:24 )  PTT:86.2 sec    CAPILLARY BLOOD GLUCOSE    POCT Blood Glucose.: 149 mg/dL (25 Oct 2019 07:52)  POCT Blood Glucose.: 213 mg/dL (24 Oct 2019 21:10)    CT/XRAY/ECHO/TCD/EEG  ----------------------------------------------------------------------------------------------  --------------------------------------------------------------------------------------  Admit Diagnosis: VASCULAR OCCLUSION      Critical Care Diagnoses:

## 2019-10-25 NOTE — PROGRESS NOTE ADULT - SUBJECTIVE AND OBJECTIVE BOX
PORFIRIO HERRERA 64y Male  MRN#: [id number]     SUBJECTIVE  Patient is a 64y old Male who presents with a chief complaint of Left foot numbness and coldness (22 Oct 2019 21:00)  Currently admitted to medicine with the primary diagnosis of Vascular occlusion    Today is hospital day 3d, and this morning he is still in pain in his left lower extremity. Planned for OR today    OBJECTIVE  PAST MEDICAL & SURGICAL HISTORY  CAD (coronary artery disease)  Dyslipidemia  Diabetes  HTN (hypertension)  History of coronary artery stent placement: x 2 stents    ALLERGIES:  No Known Allergies    MEDICATIONS:  MEDICATIONS  (STANDING):  aspirin enteric coated 81 milliGRAM(s) Oral daily  atorvastatin 80 milliGRAM(s) Oral at bedtime  chlorhexidine 4% Liquid 1 Application(s) Topical <User Schedule>  dextrose 5%. 1000 milliLiter(s) (50 mL/Hr) IV Continuous <Continuous>  dextrose 50% Injectable 12.5 Gram(s) IV Push once  dextrose 50% Injectable 25 Gram(s) IV Push once  dextrose 50% Injectable 25 Gram(s) IV Push once  heparin  Infusion 1300 Unit(s)/Hr (13 mL/Hr) IV Continuous <Continuous>  insulin glargine Injectable (LANTUS) 18 Unit(s) SubCutaneous at bedtime  insulin lispro (HumaLOG) corrective regimen sliding scale   SubCutaneous three times a day before meals  insulin lispro Injectable (HumaLOG) 6 Unit(s) SubCutaneous three times a day before meals  lisinopril 20 milliGRAM(s) Oral daily  metoprolol tartrate 50 milliGRAM(s) Oral two times a day  sodium chloride 0.9%. 1000 milliLiter(s) (100 mL/Hr) IV Continuous <Continuous>    MEDICATIONS  (PRN):  dextrose 40% Gel 15 Gram(s) Oral once PRN Blood Glucose LESS THAN 70 milliGRAM(s)/deciliter  glucagon  Injectable 1 milliGRAM(s) IntraMuscular once PRN Glucose LESS THAN 70 milligrams/deciliter  heparin  Injectable 6500 Unit(s) IV Push every 6 hours PRN For aPTT less than 40  heparin  Injectable 3000 Unit(s) IV Push every 6 hours PRN For aPTT between 40 - 57    VITAL SIGNS: Last 24 Hours  Vital Signs Last 24 Hrs  T(C): 36.4 (25 Oct 2019 12:59), Max: 36.4 (24 Oct 2019 15:30)  T(F): 97.5 (25 Oct 2019 09:46), Max: 97.6 (24 Oct 2019 15:30)  HR: 69 (25 Oct 2019 12:59) (69 - 86)  BP: 132/67 (25 Oct 2019 12:59) (120/58 - 140/69)  BP(mean): --  RR: 18 (25 Oct 2019 12:59) (18 - 18)  SpO2: --    LABS:                        12.1   7.53  )-----------( 159      ( 25 Oct 2019 05:24 )             40.0   10-25    140  |  100  |  12  ----------------------------<  144<H>  3.9   |  29  |  0.7    Ca    9.0      25 Oct 2019 05:24                 RADIOLOGY:  < from: CT Angio Abd Aorta w/run-off w/ IV Cont (10.22.19 @ 15:57) >  IMPRESSION:    1.  Mild abdominal aortic atherosclerotic disease. Mild narrowing at the   celiac artery ostium, with severe narrowing of proximal splenic artery.   Bilateral renal arteries are grossly patent.    2.  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 The distal right external iliac artery, common femoral artery,   SFA/profunda femoris arteries, and popliteal artery are patent. Intact   three-vessel runoff to the right ankle and foot.    2.  Left lower extremity: Severe tandem disease of the left internal   iliac artery. The left external iliac and common femoral artery are   patent. There is complete occlusion of the left superficial femoral   artery at the level below the femoral neck, extending to the level of the   mid thigh. There is subsequent reconstitution of flow. The left popliteal   artery is patent, with intact two-vessel runoff to the left ankle and   foot. Severely diseased anterior tibialartery at the level midshin down   to the ankle, dorsalis pedis artery is patent.    4.  Thickened bilateral adrenal glands, with fatty components concerning   for bilateral adrenal adenomas. Nonurgent laboratory workup is   recommended.    PHYSICAL EXAM:    GENERAL: NAD, well-developed, AAOx3  HEENT:  Atraumatic, Normocephalic. EOMI, PERRLA, conjunctiva and sclera clear, No JVD  PULMONARY: Clear to auscultation bilaterally; No wheeze  CARDIOVASCULAR: Regular rate and rhythm; No murmurs, rubs, or gallops  GASTROINTESTINAL: Soft, Nontender, Nondistended; Bowel sounds present  MUSCULOSKELETAL:  no pedal pulses appreciated. Left leg is cold, but no blue-arturo discoloration.   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     Vascular: complete occlusion of left superficial femoral artery (on Duplex and CT angio)  - Patient also has severe PVD on several other vessels.   - on heparin drip  - planned for OR today  - continue aspirin post OR. f/u with vascular for possible DAPT?    Cardio: (1) CAD s/p PCI  - on aspirin  - stress test positive. Cardio do not want to cath the patient  - no need to resume heparin drip post-OR from cardio prespective  - Echo: good ejection fraction, no valvular abnormalities  (2) HTN  - lisinopril and metoprolol   - BP well controlled  (4) DLD  - on statin     Endo: DMII  - on Lantus 18U and lispro 6U    DVT prophylaxis: heparin drip for now. Patient is ambulating well. Consider Lovenox post-OR  Diet: DASH post OR  Activity: as tolerated  Dispo: acute

## 2019-10-26 LAB
ANION GAP SERPL CALC-SCNC: 11 MMOL/L — SIGNIFICANT CHANGE UP (ref 7–14)
APTT BLD: 29.8 SEC — SIGNIFICANT CHANGE UP (ref 27–39.2)
BUN SERPL-MCNC: 8 MG/DL — LOW (ref 10–20)
CALCIUM SERPL-MCNC: 8.6 MG/DL — SIGNIFICANT CHANGE UP (ref 8.5–10.1)
CHLORIDE SERPL-SCNC: 97 MMOL/L — LOW (ref 98–110)
CK MB CFR SERPL CALC: 1.7 NG/ML — SIGNIFICANT CHANGE UP (ref 0.6–6.3)
CK MB CFR SERPL CALC: 1.7 NG/ML — SIGNIFICANT CHANGE UP (ref 0.6–6.3)
CK SERPL-CCNC: 86 U/L — SIGNIFICANT CHANGE UP (ref 0–225)
CO2 SERPL-SCNC: 27 MMOL/L — SIGNIFICANT CHANGE UP (ref 17–32)
CREAT SERPL-MCNC: 0.6 MG/DL — LOW (ref 0.7–1.5)
GLUCOSE BLDC GLUCOMTR-MCNC: 131 MG/DL — HIGH (ref 70–99)
GLUCOSE BLDC GLUCOMTR-MCNC: 192 MG/DL — HIGH (ref 70–99)
GLUCOSE BLDC GLUCOMTR-MCNC: 246 MG/DL — HIGH (ref 70–99)
GLUCOSE BLDC GLUCOMTR-MCNC: 253 MG/DL — HIGH (ref 70–99)
GLUCOSE SERPL-MCNC: 159 MG/DL — HIGH (ref 70–99)
HCT VFR BLD CALC: 35.4 % — LOW (ref 42–52)
HGB BLD-MCNC: 11 G/DL — LOW (ref 14–18)
INR BLD: 1.21 RATIO — SIGNIFICANT CHANGE UP (ref 0.65–1.3)
MAGNESIUM SERPL-MCNC: 1.6 MG/DL — LOW (ref 1.8–2.4)
MCHC RBC-ENTMCNC: 19.7 PG — LOW (ref 27–31)
MCHC RBC-ENTMCNC: 31.1 G/DL — LOW (ref 32–37)
MCV RBC AUTO: 63.4 FL — LOW (ref 80–94)
NRBC # BLD: 0 /100 WBCS — SIGNIFICANT CHANGE UP (ref 0–0)
PHOSPHATE SERPL-MCNC: 4 MG/DL — SIGNIFICANT CHANGE UP (ref 2.1–4.9)
PLATELET # BLD AUTO: 149 K/UL — SIGNIFICANT CHANGE UP (ref 130–400)
POTASSIUM SERPL-MCNC: 4 MMOL/L — SIGNIFICANT CHANGE UP (ref 3.5–5)
POTASSIUM SERPL-SCNC: 4 MMOL/L — SIGNIFICANT CHANGE UP (ref 3.5–5)
PROTHROM AB SERPL-ACNC: 13.9 SEC — HIGH (ref 9.95–12.87)
RBC # BLD: 5.58 M/UL — SIGNIFICANT CHANGE UP (ref 4.7–6.1)
RBC # FLD: 14.3 % — SIGNIFICANT CHANGE UP (ref 11.5–14.5)
SODIUM SERPL-SCNC: 135 MMOL/L — SIGNIFICANT CHANGE UP (ref 135–146)
TROPONIN T SERPL-MCNC: <0.01 NG/ML — SIGNIFICANT CHANGE UP
TROPONIN T SERPL-MCNC: <0.01 NG/ML — SIGNIFICANT CHANGE UP
WBC # BLD: 13.69 K/UL — HIGH (ref 4.8–10.8)
WBC # FLD AUTO: 13.69 K/UL — HIGH (ref 4.8–10.8)

## 2019-10-26 PROCEDURE — 71045 X-RAY EXAM CHEST 1 VIEW: CPT | Mod: 26

## 2019-10-26 PROCEDURE — 99231 SBSQ HOSP IP/OBS SF/LOW 25: CPT

## 2019-10-26 RX ORDER — MAGNESIUM SULFATE 500 MG/ML
2 VIAL (ML) INJECTION ONCE
Refills: 0 | Status: COMPLETED | OUTPATIENT
Start: 2019-10-26 | End: 2019-10-26

## 2019-10-26 RX ORDER — ACETAMINOPHEN 500 MG
650 TABLET ORAL EVERY 6 HOURS
Refills: 0 | Status: DISCONTINUED | OUTPATIENT
Start: 2019-10-26 | End: 2019-10-29

## 2019-10-26 RX ADMIN — OXYCODONE HYDROCHLORIDE 10 MILLIGRAM(S): 5 TABLET ORAL at 11:36

## 2019-10-26 RX ADMIN — PANTOPRAZOLE SODIUM 40 MILLIGRAM(S): 20 TABLET, DELAYED RELEASE ORAL at 06:26

## 2019-10-26 RX ADMIN — OXYCODONE HYDROCHLORIDE 5 MILLIGRAM(S): 5 TABLET ORAL at 08:55

## 2019-10-26 RX ADMIN — OXYCODONE HYDROCHLORIDE 5 MILLIGRAM(S): 5 TABLET ORAL at 08:25

## 2019-10-26 RX ADMIN — OXYCODONE HYDROCHLORIDE 5 MILLIGRAM(S): 5 TABLET ORAL at 02:39

## 2019-10-26 RX ADMIN — SODIUM CHLORIDE 3 MILLILITER(S): 9 INJECTION INTRAMUSCULAR; INTRAVENOUS; SUBCUTANEOUS at 06:26

## 2019-10-26 RX ADMIN — HEPARIN SODIUM 5000 UNIT(S): 5000 INJECTION INTRAVENOUS; SUBCUTANEOUS at 14:02

## 2019-10-26 RX ADMIN — Medication 100 MILLIGRAM(S): at 05:53

## 2019-10-26 RX ADMIN — SODIUM CHLORIDE 3 MILLILITER(S): 9 INJECTION INTRAMUSCULAR; INTRAVENOUS; SUBCUTANEOUS at 14:24

## 2019-10-26 RX ADMIN — OXYCODONE HYDROCHLORIDE 10 MILLIGRAM(S): 5 TABLET ORAL at 12:06

## 2019-10-26 RX ADMIN — Medication 25 GRAM(S): at 02:55

## 2019-10-26 RX ADMIN — Medication 50 MILLIGRAM(S): at 05:52

## 2019-10-26 RX ADMIN — HEPARIN SODIUM 5000 UNIT(S): 5000 INJECTION INTRAVENOUS; SUBCUTANEOUS at 05:53

## 2019-10-26 RX ADMIN — ATORVASTATIN CALCIUM 80 MILLIGRAM(S): 80 TABLET, FILM COATED ORAL at 22:08

## 2019-10-26 RX ADMIN — Medication 2: at 11:40

## 2019-10-26 RX ADMIN — OXYCODONE HYDROCHLORIDE 10 MILLIGRAM(S): 5 TABLET ORAL at 17:36

## 2019-10-26 RX ADMIN — Medication 650 MILLIGRAM(S): at 18:00

## 2019-10-26 RX ADMIN — Medication 50 MILLIGRAM(S): at 17:37

## 2019-10-26 RX ADMIN — Medication 100 MILLIGRAM(S): at 14:01

## 2019-10-26 RX ADMIN — LISINOPRIL 20 MILLIGRAM(S): 2.5 TABLET ORAL at 05:52

## 2019-10-26 NOTE — PROGRESS NOTE ADULT - SUBJECTIVE AND OBJECTIVE BOX
GENERAL SURGERY PROGRESS NOTE     PORFIRIO HERRERA  64y  Male  Hospital day :4d  POD:  Procedure: Femoral-popliteal bypass graft with non-vein    OVERNIGHT EVENTS:  POD1 from LLE femoral to popliteal bypass with PTFE. Was hypotensive intraoperatively requiring neomycin. Admitted to SICU for Q1H vascular checks.   Endorses severe pain in L graft site. Knee and groin dressing c/d/i. Palpable pulses b/l in DP and PT. Dopplerable popliteal pulses. No N/V. Wong in place.    T(F): 97.7 (10-25-19 @ 22:00), Max: 98.5 (10-25-19 @ 17:35)  HR: 61 (10-25-19 @ 23:00) (60 - 90)  BP: 136/65 (10-25-19 @ 22:00) (132/67 - 140/69)  ABP: 137/56 (10-25-19 @ 23:00) (126/62 - 160/70)  ABP(mean): 84 (10-25-19 @ 23:00) (77 - 98)  RR: 12 (10-25-19 @ 23:00) (12 - 19)  SpO2: 97% (10-25-19 @ 23:00) (94% - 98%)    DIET/FLUIDS: sodium chloride 0.9% lock flush 3 milliLiter(s) IV Push every 8 hours  sodium chloride 0.9%. 1000 milliLiter(s) IV Continuous <Continuous>    URINE:    Indwelling Urethral Catheter:     Connect To:  Straight Drainage/[place]    Indication:  Perioperative use for Selected Surgical Procedures    Additional Instructions:  REMOVE at 48 hours (10-25-19 @ 18:12)    GI proph:  pantoprazole    Tablet 40 milliGRAM(s) Oral before breakfast  AC/ proph: heparin  Injectable 5000 Unit(s) SubCutaneous every 8 hours  ABx: ceFAZolin   IVPB 2000 milliGRAM(s) IV Intermittent every 8 hours    PHYSICAL EXAM:  GENERAL: NAD, well-appearing  CHEST/LUNG: Clear to auscultation bilaterally  HEART: Regular rate and rhythm  ABDOMEN: Soft, Nontender, Nondistended; wong in place, L groin dressing c/d/i, no erythema, swelling, or pain on palpation  EXTREMITIES: L medial knee dressing c/d/i, TTP, no erythema/swelling    LABS  Labs:  CAPILLARY BLOOD GLUCOSE    POCT Blood Glucose.: 155 mg/dL (25 Oct 2019 21:34)  POCT Blood Glucose.: 149 mg/dL (25 Oct 2019 07:52)                        11.5   8.68  )-----------( 152      ( 25 Oct 2019 18:30 )             36.6       10-25    138  |  100  |  9<L>  ----------------------------<  142<H>  4.0   |  28  |  0.6<L>    Calcium, Total Serum: 8.7 mg/dL (10-25-19 @ 18:30)    Coags:  x      ----< x       ( 25 Oct 2019 05:24 )     86.2      CARDIAC MARKERS ( 25 Oct 2019 18:30 )  x     / <0.01 ng/mL / 68 U/L / x     / 1.7 ng/mL

## 2019-10-26 NOTE — PROGRESS NOTE ADULT - SUBJECTIVE AND OBJECTIVE BOX
Patient's chart reviewed and patient examined by Torsten Reyes MD (contact:[phone])    SUBJ: c/o Left leg kong 3/10    Events in last 24 hours: s/p Left Fem pop bypass surgery. No chest pain.    MEDICATIONS  (STANDING):  atorvastatin 80 milliGRAM(s) Oral at bedtime  chlorhexidine 4% Liquid 1 Application(s) Topical <User Schedule>  heparin  Injectable 5000 Unit(s) SubCutaneous every 8 hours  insulin glargine Injectable (LANTUS) 18 Unit(s) SubCutaneous at bedtime  insulin lispro (HumaLOG) corrective regimen sliding scale   SubCutaneous three times a day before meals  lisinopril 20 milliGRAM(s) Oral daily  metoprolol tartrate 50 milliGRAM(s) Oral two times a day  pantoprazole    Tablet 40 milliGRAM(s) Oral before breakfast  sodium chloride 0.9% lock flush 3 milliLiter(s) IV Push every 8 hours    MEDICATIONS  (PRN):  acetaminophen   Tablet .. 650 milliGRAM(s) Oral every 6 hours PRN Temp greater or equal to 38C (100.4F), Mild Pain (1 - 3)  ondansetron Injectable 4 milliGRAM(s) IV Push every 6 hours PRN Nausea  oxyCODONE    IR 5 milliGRAM(s) Oral every 4 hours PRN Moderate Pain (4 - 6)  oxyCODONE    IR 10 milliGRAM(s) Oral every 6 hours PRN Severe Pain (7 - 10)  senna 2 Tablet(s) Oral daily PRN Constipation          Vital Signs Last 24 Hrs  T(C): 38.1 (26 Oct 2019 17:50), Max: 38.1 (26 Oct 2019 17:50)  T(F): 100.6 (26 Oct 2019 17:50), Max: 100.6 (26 Oct 2019 17:50)  HR: 86 (26 Oct 2019 16:39) (60 - 86)  BP: 146/61 (26 Oct 2019 16:39) (100/50 - 146/61)  BP(mean): 78 (26 Oct 2019 10:45) (74 - 78)  RR: 18 (26 Oct 2019 16:39) (13 - 22)  SpO2: 96% (26 Oct 2019 10:45) (93% - 98%)     REVIEW OF SYSTEMS:  CONSTITUTIONAL: No fever, chills.  CARDIOLOGY: Denies chest pain, shortness of breath or palpitations.   RESPIRATORY: denies cough, shortness of breath, wheezing.   NEUROLOGICAL: Generalized weakness, no focal deficits to report.  GI: no melena/hematochezia, denies nausea, Vomiting or diarrhea.    PSYCHIATRY: normal mood and affect    PHYSICAL EXAM:  · CONSTITUTIONAL:	Well-developed, well nourished M in no distress    ·RESPIRATORY:   breath sounds diminished bilaterlly;   no rales or wheeze  · CARDIOVASCULAR	S1 and S2 soft intensity, no rub, gallop or murmur,    ABDOMEN: soft, non-tender, NABS  · EXTREMITIES: No cyanosis, clubbing or edema  NEURO: alert and oriented x 3, no focal deficits.  · VASCULAR: 	Left PT 2+  	      LABS:                        11.0   13.69 )-----------( 149      ( 26 Oct 2019 00:00 )             35.4     10-26    135  |  97<L>  |  8<L>  ----------------------------<  159<H>  4.0   |  27  |  0.6<L>    Ca    8.6      26 Oct 2019 00:00  Phos  4.0     10-26  Mg     1.6     10-26      CARDIAC MARKERS ( 26 Oct 2019 06:27 )  x     / <0.01 ng/mL / 86 U/L / x     / 1.7 ng/mL  CARDIAC MARKERS ( 26 Oct 2019 00:00 )  x     / <0.01 ng/mL / 85 U/L / x     / 1.7 ng/mL  CARDIAC MARKERS ( 25 Oct 2019 18:30 )  x     / <0.01 ng/mL / 68 U/L / x     / 1.7 ng/mL      PT/INR - ( 26 Oct 2019 00:00 )   PT: 13.90 sec;   INR: 1.21 ratio         PTT - ( 26 Oct 2019 00:00 )  PTT:29.8 sec    I&O's Summary    25 Oct 2019 07:01  -  26 Oct 2019 07:00  --------------------------------------------------------  IN: 1470 mL / OUT: 1225 mL / NET: 245 mL    26 Oct 2019 07:01  -  26 Oct 2019 23:11  --------------------------------------------------------  IN: 440 mL / OUT: 1160 mL / NET: -720 mL      BNP  RADIOLOGY & ADDITIONAL STUDIES:    IMPRESSION AND PLAN:

## 2019-10-26 NOTE — CHART NOTE - NSCHARTNOTEFT_GEN_A_CORE
SICU Transfer Note    PORFIRIO HERRERA  64y (1955)  [id number]      Transfer from: SICU  Transfer to: Surgery-Vascular      SICU COURSE:  64y Male HD# 4d    s/p LLE femoral to above knee popliteal artery bypass with PTFE upgraded to SICU for q1 vascular checks.   Overnight no acute events. Pulse exam 10/26 am b/l signals PT/DP. Patient cleared to be downgraded, d/w dr. joyner and dr. baker      PAST MEDICAL & SURGICAL HISTORY:  CAD (coronary artery disease)  Dyslipidemia  Diabetes  HTN (hypertension)  History of coronary artery stent placement: x 2 stents    Allergies    No Known Allergies    Intolerances      MEDICATIONS  (STANDING):  atorvastatin 80 milliGRAM(s) Oral at bedtime  ceFAZolin   IVPB 2000 milliGRAM(s) IV Intermittent every 8 hours  chlorhexidine 4% Liquid 1 Application(s) Topical <User Schedule>  heparin  Injectable 5000 Unit(s) SubCutaneous every 8 hours  insulin glargine Injectable (LANTUS) 18 Unit(s) SubCutaneous at bedtime  insulin lispro (HumaLOG) corrective regimen sliding scale   SubCutaneous three times a day before meals  lisinopril 20 milliGRAM(s) Oral daily  metoprolol tartrate 50 milliGRAM(s) Oral two times a day  pantoprazole    Tablet 40 milliGRAM(s) Oral before breakfast  sodium chloride 0.9% lock flush 3 milliLiter(s) IV Push every 8 hours    MEDICATIONS  (PRN):  acetaminophen   Tablet .. 650 milliGRAM(s) Oral every 6 hours PRN Mild Pain (1 - 3)  ondansetron Injectable 4 milliGRAM(s) IV Push every 6 hours PRN Nausea  oxyCODONE    IR 5 milliGRAM(s) Oral every 4 hours PRN Moderate Pain (4 - 6)  oxyCODONE    IR 10 milliGRAM(s) Oral every 6 hours PRN Severe Pain (7 - 10)  senna 2 Tablet(s) Oral daily PRN Constipation      Vital Signs Last 24 Hrs  T(C): 35.7 (26 Oct 2019 11:23), Max: 36.9 (25 Oct 2019 17:35)  T(F): 96.3 (26 Oct 2019 11:23), Max: 98.5 (25 Oct 2019 17:35)  HR: 77 (26 Oct 2019 11:23) (60 - 90)  BP: 102/52 (26 Oct 2019 11:23) (100/50 - 136/65)  BP(mean): 78 (26 Oct 2019 10:45) (74 - 78)  RR: 19 (26 Oct 2019 11:23) (12 - 22)  SpO2: 96% (26 Oct 2019 10:45) (93% - 98%)  I&O's Summary    25 Oct 2019 07:01  -  26 Oct 2019 07:00  --------------------------------------------------------  IN: 1470 mL / OUT: 1225 mL / NET: 245 mL    26 Oct 2019 07:01  -  26 Oct 2019 12:24  --------------------------------------------------------  IN: 440 mL / OUT: 585 mL / NET: -145 mL        LABS  LABS:  CAPILLARY BLOOD GLUCOSE      POCT Blood Glucose.: 246 mg/dL (26 Oct 2019 11:31)  POCT Blood Glucose.: 131 mg/dL (26 Oct 2019 06:18)  POCT Blood Glucose.: 155 mg/dL (25 Oct 2019 21:34)                          11.0   13.69 )-----------( 149      ( 26 Oct 2019 00:00 )             35.4       10-26    135  |  97<L>  |  8<L>  ----------------------------<  159<H>  4.0   |  27  |  0.6<L>    Ca    8.6      26 Oct 2019 00:00  Phos  4.0     10-26  Mg     1.6     10-26        PT/INR - ( 26 Oct 2019 00:00 )   PT: 13.90 sec;   INR: 1.21 ratio         PTT - ( 26 Oct 2019 00:00 )  PTT:29.8 sec  CARDIAC MARKERS ( 26 Oct 2019 06:27 )  x     / <0.01 ng/mL / 86 U/L / x     / 1.7 ng/mL  CARDIAC MARKERS ( 26 Oct 2019 00:00 )  x     / <0.01 ng/mL / 85 U/L / x     / 1.7 ng/mL  CARDIAC MARKERS ( 25 Oct 2019 18:30 )  x     / <0.01 ng/mL / 68 U/L / x     / 1.7 ng/mL                Assessment & Plan:    64y Male with PMHx/PSHx of CAD/PVD s/p PCI in 2009 x2 stents (f/u with Dr. Bucio), DM, HLD, HTN, COPD, anxiety, and smoker 50 pack years s/p s/p LLE femoral to above knee popliteal artery bypass with PTFE upgraded to SICU for q1 vascular checks.     NEURO:   Pain-controlled  Oxy PRN, Tylenol PRN  Nausea: Zofran    RESP:   Hx of COPD, and 50 pack year smoking history  Satting >92% on RA  OOBTC    CARDS:   HTN: On Lisinopril 20 QD and metroprolol 50 BID  HLD: Atorvastatin 80 QHS  EKG unchanged from preop    GI/NUTR:   Diet,-CCD  Bowel regimen: Senna  PPI    /RENAL:  IVL  Taylor-TOV at 1800 on 10/26    HEME/ONC:   H/H stable  HSQ    ID:   Afebrile. No leukocytosis  Periop Ancef 2 g stop after 3 doses    ENDO:  DM on ISS      Follow Up:  -2330 labs  -TOV at 1800  -Increase activity as per primary team      Signed out to: Dr. Shah  Date: 10/26/19  Time: 1225 attempt 1x, will recall SICU Transfer Note    PORFIRIO HERRERA  64y (1955)  [id number]      Transfer from: SICU  Transfer to: Surgery-Vascular      SICU COURSE:  64y Male HD# 4d    s/p LLE femoral to above knee popliteal artery bypass with PTFE upgraded to SICU for q1 vascular checks.   Overnight no acute events. Pulse exam 10/26 am b/l signals PT/DP. Patient cleared to be downgraded, d/w dr. joyner and dr. baker      PAST MEDICAL & SURGICAL HISTORY:  CAD (coronary artery disease)  Dyslipidemia  Diabetes  HTN (hypertension)  History of coronary artery stent placement: x 2 stents    Allergies    No Known Allergies    Intolerances      MEDICATIONS  (STANDING):  atorvastatin 80 milliGRAM(s) Oral at bedtime  ceFAZolin   IVPB 2000 milliGRAM(s) IV Intermittent every 8 hours  chlorhexidine 4% Liquid 1 Application(s) Topical <User Schedule>  heparin  Injectable 5000 Unit(s) SubCutaneous every 8 hours  insulin glargine Injectable (LANTUS) 18 Unit(s) SubCutaneous at bedtime  insulin lispro (HumaLOG) corrective regimen sliding scale   SubCutaneous three times a day before meals  lisinopril 20 milliGRAM(s) Oral daily  metoprolol tartrate 50 milliGRAM(s) Oral two times a day  pantoprazole    Tablet 40 milliGRAM(s) Oral before breakfast  sodium chloride 0.9% lock flush 3 milliLiter(s) IV Push every 8 hours    MEDICATIONS  (PRN):  acetaminophen   Tablet .. 650 milliGRAM(s) Oral every 6 hours PRN Mild Pain (1 - 3)  ondansetron Injectable 4 milliGRAM(s) IV Push every 6 hours PRN Nausea  oxyCODONE    IR 5 milliGRAM(s) Oral every 4 hours PRN Moderate Pain (4 - 6)  oxyCODONE    IR 10 milliGRAM(s) Oral every 6 hours PRN Severe Pain (7 - 10)  senna 2 Tablet(s) Oral daily PRN Constipation      Vital Signs Last 24 Hrs  T(C): 35.7 (26 Oct 2019 11:23), Max: 36.9 (25 Oct 2019 17:35)  T(F): 96.3 (26 Oct 2019 11:23), Max: 98.5 (25 Oct 2019 17:35)  HR: 77 (26 Oct 2019 11:23) (60 - 90)  BP: 102/52 (26 Oct 2019 11:23) (100/50 - 136/65)  BP(mean): 78 (26 Oct 2019 10:45) (74 - 78)  RR: 19 (26 Oct 2019 11:23) (12 - 22)  SpO2: 96% (26 Oct 2019 10:45) (93% - 98%)  I&O's Summary    25 Oct 2019 07:01  -  26 Oct 2019 07:00  --------------------------------------------------------  IN: 1470 mL / OUT: 1225 mL / NET: 245 mL    26 Oct 2019 07:01  -  26 Oct 2019 12:24  --------------------------------------------------------  IN: 440 mL / OUT: 585 mL / NET: -145 mL        LABS  LABS:  CAPILLARY BLOOD GLUCOSE      POCT Blood Glucose.: 246 mg/dL (26 Oct 2019 11:31)  POCT Blood Glucose.: 131 mg/dL (26 Oct 2019 06:18)  POCT Blood Glucose.: 155 mg/dL (25 Oct 2019 21:34)                          11.0   13.69 )-----------( 149      ( 26 Oct 2019 00:00 )             35.4       10-26    135  |  97<L>  |  8<L>  ----------------------------<  159<H>  4.0   |  27  |  0.6<L>    Ca    8.6      26 Oct 2019 00:00  Phos  4.0     10-26  Mg     1.6     10-26        PT/INR - ( 26 Oct 2019 00:00 )   PT: 13.90 sec;   INR: 1.21 ratio         PTT - ( 26 Oct 2019 00:00 )  PTT:29.8 sec  CARDIAC MARKERS ( 26 Oct 2019 06:27 )  x     / <0.01 ng/mL / 86 U/L / x     / 1.7 ng/mL  CARDIAC MARKERS ( 26 Oct 2019 00:00 )  x     / <0.01 ng/mL / 85 U/L / x     / 1.7 ng/mL  CARDIAC MARKERS ( 25 Oct 2019 18:30 )  x     / <0.01 ng/mL / 68 U/L / x     / 1.7 ng/mL                Assessment & Plan:    64y Male with PMHx/PSHx of CAD/PVD s/p PCI in 2009 x2 stents (f/u with Dr. Bucio), DM, HLD, HTN, COPD, anxiety, and smoker 50 pack years s/p s/p LLE femoral to above knee popliteal artery bypass with PTFE upgraded to SICU for q1 vascular checks.     NEURO:   Pain-controlled  Oxy PRN, Tylenol PRN  Nausea: Zofran    RESP:   Hx of COPD, and 50 pack year smoking history  Satting >92% on RA  OOBTC    CARDS:   HTN: On Lisinopril 20 QD and metroprolol 50 BID  HLD: Atorvastatin 80 QHS  EKG unchanged from preop    GI/NUTR:   Diet,-CCD  Bowel regimen: Senna  PPI    /RENAL:  IVL  Taylor-TOV at 1800 on 10/26    HEME/ONC:   H/H stable  HSQ    ID:   Afebrile. No leukocytosis  Periop Ancef 2 g stop after 3 doses    ENDO:  DM on ISS      Follow Up:  -2330 labs  -TOV at 1800  -Increase activity as per primary team      Signed out to: Dr. Shah  Date: 10/26/19  Time: 1225 attempt 1x, will recall  recall at 1411,not available  will recall SICU Transfer Note    PORFIRIO HERRERA  64y (1955)  [id number]      Transfer from: SICU  Transfer to: Surgery-Vascular      SICU COURSE:  64y Male HD# 4d    s/p LLE femoral to above knee popliteal artery bypass with PTFE upgraded to SICU for q1 vascular checks.   Overnight no acute events. Pulse exam 10/26 am b/l signals PT/DP. Patient cleared to be downgraded, d/w dr. joyner and dr. baker      PAST MEDICAL & SURGICAL HISTORY:  CAD (coronary artery disease)  Dyslipidemia  Diabetes  HTN (hypertension)  History of coronary artery stent placement: x 2 stents    Allergies    No Known Allergies    Intolerances      MEDICATIONS  (STANDING):  atorvastatin 80 milliGRAM(s) Oral at bedtime  ceFAZolin   IVPB 2000 milliGRAM(s) IV Intermittent every 8 hours  chlorhexidine 4% Liquid 1 Application(s) Topical <User Schedule>  heparin  Injectable 5000 Unit(s) SubCutaneous every 8 hours  insulin glargine Injectable (LANTUS) 18 Unit(s) SubCutaneous at bedtime  insulin lispro (HumaLOG) corrective regimen sliding scale   SubCutaneous three times a day before meals  lisinopril 20 milliGRAM(s) Oral daily  metoprolol tartrate 50 milliGRAM(s) Oral two times a day  pantoprazole    Tablet 40 milliGRAM(s) Oral before breakfast  sodium chloride 0.9% lock flush 3 milliLiter(s) IV Push every 8 hours    MEDICATIONS  (PRN):  acetaminophen   Tablet .. 650 milliGRAM(s) Oral every 6 hours PRN Mild Pain (1 - 3)  ondansetron Injectable 4 milliGRAM(s) IV Push every 6 hours PRN Nausea  oxyCODONE    IR 5 milliGRAM(s) Oral every 4 hours PRN Moderate Pain (4 - 6)  oxyCODONE    IR 10 milliGRAM(s) Oral every 6 hours PRN Severe Pain (7 - 10)  senna 2 Tablet(s) Oral daily PRN Constipation      Vital Signs Last 24 Hrs  T(C): 35.7 (26 Oct 2019 11:23), Max: 36.9 (25 Oct 2019 17:35)  T(F): 96.3 (26 Oct 2019 11:23), Max: 98.5 (25 Oct 2019 17:35)  HR: 77 (26 Oct 2019 11:23) (60 - 90)  BP: 102/52 (26 Oct 2019 11:23) (100/50 - 136/65)  BP(mean): 78 (26 Oct 2019 10:45) (74 - 78)  RR: 19 (26 Oct 2019 11:23) (12 - 22)  SpO2: 96% (26 Oct 2019 10:45) (93% - 98%)  I&O's Summary    25 Oct 2019 07:01  -  26 Oct 2019 07:00  --------------------------------------------------------  IN: 1470 mL / OUT: 1225 mL / NET: 245 mL    26 Oct 2019 07:01  -  26 Oct 2019 12:24  --------------------------------------------------------  IN: 440 mL / OUT: 585 mL / NET: -145 mL        LABS  LABS:  CAPILLARY BLOOD GLUCOSE      POCT Blood Glucose.: 246 mg/dL (26 Oct 2019 11:31)  POCT Blood Glucose.: 131 mg/dL (26 Oct 2019 06:18)  POCT Blood Glucose.: 155 mg/dL (25 Oct 2019 21:34)                          11.0   13.69 )-----------( 149      ( 26 Oct 2019 00:00 )             35.4       10-26    135  |  97<L>  |  8<L>  ----------------------------<  159<H>  4.0   |  27  |  0.6<L>    Ca    8.6      26 Oct 2019 00:00  Phos  4.0     10-26  Mg     1.6     10-26        PT/INR - ( 26 Oct 2019 00:00 )   PT: 13.90 sec;   INR: 1.21 ratio         PTT - ( 26 Oct 2019 00:00 )  PTT:29.8 sec  CARDIAC MARKERS ( 26 Oct 2019 06:27 )  x     / <0.01 ng/mL / 86 U/L / x     / 1.7 ng/mL  CARDIAC MARKERS ( 26 Oct 2019 00:00 )  x     / <0.01 ng/mL / 85 U/L / x     / 1.7 ng/mL  CARDIAC MARKERS ( 25 Oct 2019 18:30 )  x     / <0.01 ng/mL / 68 U/L / x     / 1.7 ng/mL                Assessment & Plan:    64y Male with PMHx/PSHx of CAD/PVD s/p PCI in 2009 x2 stents (f/u with Dr. Bucio), DM, HLD, HTN, COPD, anxiety, and smoker 50 pack years s/p s/p LLE femoral to above knee popliteal artery bypass with PTFE upgraded to SICU for q1 vascular checks.     NEURO:   Pain-controlled  Oxy PRN, Tylenol PRN  Nausea: Zofran    RESP:   Hx of COPD, and 50 pack year smoking history  Satting >92% on RA  OOBTC    CARDS:   HTN: On Lisinopril 20 QD and metroprolol 50 BID  HLD: Atorvastatin 80 QHS  EKG unchanged from preop    GI/NUTR:   Diet,-CCD  Bowel regimen: Senna  PPI    /RENAL:  IVL  Taylor-TOV at 1800 on 10/26    HEME/ONC:   H/H stable  HSQ    ID:   Afebrile. No leukocytosis  Periop Ancef 2 g stop after 3 doses    ENDO:  DM on ISS      Follow Up:  -2330 labs  -TOV at 1800  -Increase activity as per primary team      Signed out to: Dr. Shah  Date: 10/26/19  Time: 1225 attempt 1x, will recall  recall at 1411,not available  will recall  s/o at 1414

## 2019-10-26 NOTE — PROGRESS NOTE ADULT - SUBJECTIVE AND OBJECTIVE BOX
PORFIRIO HERRERA  [id number]  64y Male    Indication for ICU admission: S/p Femoral to above knee popliteal artery bypass with PTFE  Admit Date:10-22-19  ICU Date: 10/25/19  OR Date: 10/25/19    No Known Allergies    PAST MEDICAL & SURGICAL HISTORY:  CAD (coronary artery disease)  Dyslipidemia  Diabetes  HTN (hypertension)  History of coronary artery stent placement: x 2 stents    Home Medications:  atorvastatin 20 mg oral tablet: 1 tab(s) orally once a day (22 Oct 2019 14:06)  lisinopril 20 mg oral tablet: 1 tab(s) orally once a day (22 Oct 2019 14:06)  metFORMIN 850 mg oral tablet: orally every 12 hours (22 Oct 2019 14:06)  metoprolol: 50 milligram(s) orally 2 times a day (22 Oct 2019 14:06)    24HRS EVENT: No acute overnight events, no active complaints    NEURO:   Pain-controlled with Tylenol 1g IV x1, Oxy PRN, Tylenol PRN  Nausea: Zofran    RESP:   Hx of COPD, and 50 pack year smoking history  Satting >92% on NC    CARDS:   HTN: On Lisinopril 20 QD and metroprolol 50 BID  HLD: Atorvastatin 80 QHS  CE neg x 1, 2 sets pending  EKG unchanged from preop    GI/NUTR:   Diet, Clear Liquid  Bowel regimen: Senna  PPI    /RENAL:  NS @ 100 cc/hr (Stop once tolerating CLD)   Taylor in place  Cr 0.7 BUN 12  F/u BMP and lytes, replete as needed    HEME/ONC:   H/H stable  HSQ    ID:   Afebrile. No leukocytosis  Periop Ancef 2 g intraop, 2 more doses left    ENDO:  DM on ISS w FS Q AC/HS  POCT 149    DVT PTX: HSQ  GI PTX:pantoprazole    Tablet 40 milliGRAM(s) Oral before breakfast  senna 2 Tablet(s) Oral daily PRN PORFIRIO HERRERA  [id number]  64y Male    Indication for ICU admission: S/p Femoral to above knee popliteal artery bypass with PTFE  Admit Date:10-22-19  ICU Date: 10/25/19  OR Date: 10/25/19    No Known Allergies    PAST MEDICAL & SURGICAL HISTORY:  CAD (coronary artery disease)  Dyslipidemia  Diabetes  HTN (hypertension)  History of coronary artery stent placement: x 2 stents    Home Medications:  atorvastatin 20 mg oral tablet: 1 tab(s) orally once a day (22 Oct 2019 14:06)  lisinopril 20 mg oral tablet: 1 tab(s) orally once a day (22 Oct 2019 14:06)  metFORMIN 850 mg oral tablet: orally every 12 hours (22 Oct 2019 14:06)  metoprolol: 50 milligram(s) orally 2 times a day (22 Oct 2019 14:06)    24HRS EVENT: No acute overnight events, no active complaints    NEURO:   Pain-controlled with Tylenol 1g IV x1, Oxy PRN, Tylenol PRN  Nausea: Zofran    RESP:   Hx of COPD, and 50 pack year smoking history  Satting >92% on NC    CARDS:   HTN: On Lisinopril 20 QD and metroprolol 50 BID  HLD: Atorvastatin 80 QHS  CE negx3  EKG unchanged from preop    GI/NUTR:   Diet, Clear Liquid  Bowel regimen: Senna  PPI    /RENAL:  NS @ 100 cc/hr (Stop once tolerating CLD)   Taylor in place  Cr 0.7 BUN 12  F/u BMP and lytes, replete as needed  Lytes-Na 135 // K 4.0 // Phos 4.0 //  Mag 1.6    HEME/ONC:   H/H stable- 11  HSQ    ID:   Afebrile. No leukocytosis. WBC 13 (8)  Periop Ancef 2 g intraop, 2 more doses left    ENDO:  DM on ISS w FS Q AC/HS- POCT 131,155,149    DVT PTX: HSQ  GI PTX:pantoprazole    Tablet 40 milliGRAM(s) Oral before breakfast  senna 2 Tablet(s) Oral daily PRN

## 2019-10-27 LAB
GLUCOSE BLDC GLUCOMTR-MCNC: 137 MG/DL — HIGH (ref 70–99)
GLUCOSE BLDC GLUCOMTR-MCNC: 168 MG/DL — HIGH (ref 70–99)
GLUCOSE BLDC GLUCOMTR-MCNC: 183 MG/DL — HIGH (ref 70–99)
GLUCOSE BLDC GLUCOMTR-MCNC: 223 MG/DL — HIGH (ref 70–99)

## 2019-10-27 PROCEDURE — 71045 X-RAY EXAM CHEST 1 VIEW: CPT | Mod: 26

## 2019-10-27 RX ORDER — DEXTROSE 50 % IN WATER 50 %
25 SYRINGE (ML) INTRAVENOUS ONCE
Refills: 0 | Status: DISCONTINUED | OUTPATIENT
Start: 2019-10-27 | End: 2019-10-29

## 2019-10-27 RX ORDER — DEXTROSE 50 % IN WATER 50 %
15 SYRINGE (ML) INTRAVENOUS ONCE
Refills: 0 | Status: DISCONTINUED | OUTPATIENT
Start: 2019-10-27 | End: 2019-10-29

## 2019-10-27 RX ORDER — SODIUM CHLORIDE 9 MG/ML
1000 INJECTION, SOLUTION INTRAVENOUS
Refills: 0 | Status: DISCONTINUED | OUTPATIENT
Start: 2019-10-27 | End: 2019-10-29

## 2019-10-27 RX ORDER — GLUCAGON INJECTION, SOLUTION 0.5 MG/.1ML
1 INJECTION, SOLUTION SUBCUTANEOUS ONCE
Refills: 0 | Status: DISCONTINUED | OUTPATIENT
Start: 2019-10-27 | End: 2019-10-29

## 2019-10-27 RX ORDER — INSULIN LISPRO 100/ML
8 VIAL (ML) SUBCUTANEOUS
Refills: 0 | Status: DISCONTINUED | OUTPATIENT
Start: 2019-10-27 | End: 2019-10-29

## 2019-10-27 RX ORDER — DEXTROSE 50 % IN WATER 50 %
12.5 SYRINGE (ML) INTRAVENOUS ONCE
Refills: 0 | Status: DISCONTINUED | OUTPATIENT
Start: 2019-10-27 | End: 2019-10-29

## 2019-10-27 RX ADMIN — Medication 8 UNIT(S): at 12:08

## 2019-10-27 RX ADMIN — Medication 650 MILLIGRAM(S): at 05:52

## 2019-10-27 RX ADMIN — OXYCODONE HYDROCHLORIDE 10 MILLIGRAM(S): 5 TABLET ORAL at 01:45

## 2019-10-27 RX ADMIN — Medication 2: at 08:35

## 2019-10-27 RX ADMIN — SODIUM CHLORIDE 3 MILLILITER(S): 9 INJECTION INTRAMUSCULAR; INTRAVENOUS; SUBCUTANEOUS at 05:45

## 2019-10-27 RX ADMIN — OXYCODONE HYDROCHLORIDE 10 MILLIGRAM(S): 5 TABLET ORAL at 07:41

## 2019-10-27 RX ADMIN — Medication 1: at 12:09

## 2019-10-27 RX ADMIN — OXYCODONE HYDROCHLORIDE 10 MILLIGRAM(S): 5 TABLET ORAL at 14:32

## 2019-10-27 RX ADMIN — HEPARIN SODIUM 5000 UNIT(S): 5000 INJECTION INTRAVENOUS; SUBCUTANEOUS at 14:32

## 2019-10-27 RX ADMIN — PANTOPRAZOLE SODIUM 40 MILLIGRAM(S): 20 TABLET, DELAYED RELEASE ORAL at 05:44

## 2019-10-27 RX ADMIN — ATORVASTATIN CALCIUM 80 MILLIGRAM(S): 80 TABLET, FILM COATED ORAL at 21:45

## 2019-10-27 RX ADMIN — Medication 1: at 17:12

## 2019-10-27 RX ADMIN — Medication 8 UNIT(S): at 17:12

## 2019-10-27 RX ADMIN — Medication 650 MILLIGRAM(S): at 01:12

## 2019-10-27 RX ADMIN — OXYCODONE HYDROCHLORIDE 10 MILLIGRAM(S): 5 TABLET ORAL at 21:45

## 2019-10-27 RX ADMIN — Medication 50 MILLIGRAM(S): at 05:44

## 2019-10-27 RX ADMIN — HEPARIN SODIUM 5000 UNIT(S): 5000 INJECTION INTRAVENOUS; SUBCUTANEOUS at 05:44

## 2019-10-27 RX ADMIN — INSULIN GLARGINE 18 UNIT(S): 100 INJECTION, SOLUTION SUBCUTANEOUS at 21:43

## 2019-10-27 RX ADMIN — LISINOPRIL 20 MILLIGRAM(S): 2.5 TABLET ORAL at 05:44

## 2019-10-27 RX ADMIN — OXYCODONE HYDROCHLORIDE 10 MILLIGRAM(S): 5 TABLET ORAL at 22:38

## 2019-10-27 RX ADMIN — SODIUM CHLORIDE 3 MILLILITER(S): 9 INJECTION INTRAMUSCULAR; INTRAVENOUS; SUBCUTANEOUS at 22:41

## 2019-10-27 RX ADMIN — OXYCODONE HYDROCHLORIDE 10 MILLIGRAM(S): 5 TABLET ORAL at 01:12

## 2019-10-27 RX ADMIN — Medication 50 MILLIGRAM(S): at 17:12

## 2019-10-27 RX ADMIN — SODIUM CHLORIDE 3 MILLILITER(S): 9 INJECTION INTRAMUSCULAR; INTRAVENOUS; SUBCUTANEOUS at 14:33

## 2019-10-27 NOTE — PROGRESS NOTE ADULT - SUBJECTIVE AND OBJECTIVE BOX
PORFIRIO HERRERA 64y Male  MRN#: [id number]     SUBJECTIVE  Patient is a 64y old Male who presents with a chief complaint of Left foot numbness and coldness   Currently admitted to medicine with the primary diagnosis of Vascular occlusion    Today is hospital day 5d, and this morning     OBJECTIVE  PAST MEDICAL & SURGICAL HISTORY  CAD (coronary artery disease)  Dyslipidemia  Diabetes  HTN (hypertension)  History of coronary artery stent placement: x 2 stents    ALLERGIES:  No Known Allergies    MEDICATIONS:  MEDICATIONS  (STANDING):  atorvastatin 80 milliGRAM(s) Oral at bedtime  chlorhexidine 4% Liquid 1 Application(s) Topical <User Schedule>  heparin  Injectable 5000 Unit(s) SubCutaneous every 8 hours  insulin glargine Injectable (LANTUS) 18 Unit(s) SubCutaneous at bedtime  insulin lispro (HumaLOG) corrective regimen sliding scale   SubCutaneous three times a day before meals  lisinopril 20 milliGRAM(s) Oral daily  metoprolol tartrate 50 milliGRAM(s) Oral two times a day  pantoprazole    Tablet 40 milliGRAM(s) Oral before breakfast  sodium chloride 0.9% lock flush 3 milliLiter(s) IV Push every 8 hours    MEDICATIONS  (PRN):  acetaminophen   Tablet .. 650 milliGRAM(s) Oral every 6 hours PRN Temp greater or equal to 38C (100.4F), Mild Pain (1 - 3)  ondansetron Injectable 4 milliGRAM(s) IV Push every 6 hours PRN Nausea  oxyCODONE    IR 5 milliGRAM(s) Oral every 4 hours PRN Moderate Pain (4 - 6)  oxyCODONE    IR 10 milliGRAM(s) Oral every 6 hours PRN Severe Pain (7 - 10)  senna 2 Tablet(s) Oral daily PRN Constipation      VITAL SIGNS: Last 24 Hours  Vital Signs Last 24 Hrs  T(C): 38.2 (27 Oct 2019 00:00), Max: 38.2 (27 Oct 2019 00:00)  T(F): 100.7 (27 Oct 2019 00:00), Max: 100.7 (27 Oct 2019 00:00)  HR: 95 (27 Oct 2019 00:00) (60 - 95)  BP: 135/60 (27 Oct 2019 00:00) (100/50 - 146/61)  BP(mean): 78 (26 Oct 2019 10:45) (74 - 78)  RR: 18 (27 Oct 2019 00:00) (16 - 19)  SpO2: 96% (26 Oct 2019 10:45) (93% - 96%)    LABS:                              PHYSICAL EXAM:    GENERAL: NAD, well-developed, AAOx3  HEENT:  Atraumatic, Normocephalic. EOMI, PERRLA, conjunctiva and sclera clear, No JVD  PULMONARY: Clear to auscultation bilaterally; No wheeze  CARDIOVASCULAR: Regular rate and rhythm; No murmurs, rubs, or gallops  GASTROINTESTINAL: Soft, Nontender, Nondistended; Bowel sounds present  MUSCULOSKELETAL:  no pedal pulses appreciated. Left leg is cold, but no blue-arturo discoloration.   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     Vascular: complete occlusion of left superficial femoral artery (on Duplex and CT angio)  - Patient also has severe PVD on several other vessels.   - planned for OR today  - continue aspirin post OR. f/u with vascular for possible DAPT?    Cardio: (1) CAD s/p PCI  - on aspirin  - stress test positive. Cardio do not want to cath the patient  - no need to resume heparin drip post-OR from cardio prespective  - Echo: good ejection fraction, no valvular abnormalities  (2) HTN  - lisinopril and metoprolol   - BP well controlled  (4) DLD  - on statin     Endo: DMII  - on Lantus 18U and lispro 6U    DVT prophylaxis:   Diet: DASH post OR  Activity: as tolerated  Dispo: acute PORFIRIO HERRERA 64y Male  MRN#: [id number]     SUBJECTIVE  Patient is a 64y old Male who presents with a chief complaint of Left foot numbness and coldness   Currently admitted to medicine with the primary diagnosis of Vascular occlusion    Today is hospital day 5d, and this morning he is well. He reports pain on his left leg post OR, but otherwise no complaints. He is post-op day 2    OBJECTIVE  PAST MEDICAL & SURGICAL HISTORY  CAD (coronary artery disease)  Dyslipidemia  Diabetes  HTN (hypertension)  History of coronary artery stent placement: x 2 stents    ALLERGIES:  No Known Allergies    MEDICATIONS:  MEDICATIONS  (STANDING):  atorvastatin 80 milliGRAM(s) Oral at bedtime  chlorhexidine 4% Liquid 1 Application(s) Topical <User Schedule>  heparin  Injectable 5000 Unit(s) SubCutaneous every 8 hours  insulin glargine Injectable (LANTUS) 18 Unit(s) SubCutaneous at bedtime  insulin lispro (HumaLOG) corrective regimen sliding scale   SubCutaneous three times a day before meals  lisinopril 20 milliGRAM(s) Oral daily  metoprolol tartrate 50 milliGRAM(s) Oral two times a day  pantoprazole    Tablet 40 milliGRAM(s) Oral before breakfast  sodium chloride 0.9% lock flush 3 milliLiter(s) IV Push every 8 hours    MEDICATIONS  (PRN):  acetaminophen   Tablet .. 650 milliGRAM(s) Oral every 6 hours PRN Temp greater or equal to 38C (100.4F), Mild Pain (1 - 3)  ondansetron Injectable 4 milliGRAM(s) IV Push every 6 hours PRN Nausea  oxyCODONE    IR 5 milliGRAM(s) Oral every 4 hours PRN Moderate Pain (4 - 6)  oxyCODONE    IR 10 milliGRAM(s) Oral every 6 hours PRN Severe Pain (7 - 10)  senna 2 Tablet(s) Oral daily PRN Constipation      VITAL SIGNS: Last 24 Hours  Vital Signs Last 24 Hrs  T(C): 38.2 (27 Oct 2019 00:00), Max: 38.2 (27 Oct 2019 00:00)  T(F): 100.7 (27 Oct 2019 00:00), Max: 100.7 (27 Oct 2019 00:00)  HR: 95 (27 Oct 2019 00:00) (60 - 95)  BP: 135/60 (27 Oct 2019 00:00) (100/50 - 146/61)  BP(mean): 78 (26 Oct 2019 10:45) (74 - 78)  RR: 18 (27 Oct 2019 00:00) (16 - 19)  SpO2: 96% (26 Oct 2019 10:45) (93% - 96%)    LABS:  PENDING                 PHYSICAL EXAM:    GENERAL: NAD, well-developed, AAOx3  HEENT:  Atraumatic, Normocephalic. EOMI, PERRLA, conjunctiva and sclera clear, No JVD  PULMONARY: Clear to auscultation bilaterally; No wheeze  CARDIOVASCULAR: Regular rate and rhythm; No murmurs, rubs, or gallops  GASTROINTESTINAL: Soft, Nontender, Nondistended; Bowel sounds present  MUSCULOSKELETAL:  equal LE pedal pulses. No discoloration. 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     Vascular: complete occlusion of left superficial femoral artery (on Duplex and CT angio): post-op day 2 for LLE bypass surgery  - Patient also has severe PVD on several other vessels.   - continue aspirin. f/u with vascular for possible DAPT?    Cardio: (1) CAD s/p PCI  - on aspirin  - stress test positive. Cardio do not want to cath the patient  - Echo: good ejection fraction, no valvular abnormalities  (2) HTN  - lisinopril and metoprolol   - BP well controlled  (4) DLD  - on statin     Endo: DMII, poorly controlled - Dot6r=7.6%  - on Lantus 18U and lispro 6U, FS poorly controlled. Will increase lispro to 8U and monitor for 24 hours    DVT prophylaxis:   Diet: DASH   Activity: as tolerated  Dispo: home

## 2019-10-27 NOTE — PROGRESS NOTE ADULT - SUBJECTIVE AND OBJECTIVE BOX
Patient was seen and examined, addressed all concerns, answered all questions. Spoke with RN. Chart reviewed.  No events overnight.  Vital Signs Last 24 Hrs  T(F): 96.9 (27 Oct 2019 07:30), Max: 100.7 (27 Oct 2019 00:00)  HR: 87 (27 Oct 2019 07:30) (62 - 95)  BP: 99/59 (27 Oct 2019 07:30) (99/59 - 146/61)  SpO2: 96% (26 Oct 2019 10:45) (93% - 96%)  MEDICATIONS  (STANDING):  atorvastatin 80 milliGRAM(s) Oral at bedtime  chlorhexidine 4% Liquid 1 Application(s) Topical <User Schedule>  dextrose 5%. 1000 milliLiter(s) (50 mL/Hr) IV Continuous <Continuous>  dextrose 50% Injectable 12.5 Gram(s) IV Push once  dextrose 50% Injectable 25 Gram(s) IV Push once  dextrose 50% Injectable 25 Gram(s) IV Push once  heparin  Injectable 5000 Unit(s) SubCutaneous every 8 hours  insulin glargine Injectable (LANTUS) 18 Unit(s) SubCutaneous at bedtime  insulin lispro (HumaLOG) corrective regimen sliding scale   SubCutaneous three times a day before meals  insulin lispro Injectable (HumaLOG) 8 Unit(s) SubCutaneous three times a day before meals  lisinopril 20 milliGRAM(s) Oral daily  metoprolol tartrate 50 milliGRAM(s) Oral two times a day  pantoprazole    Tablet 40 milliGRAM(s) Oral before breakfast  sodium chloride 0.9% lock flush 3 milliLiter(s) IV Push every 8 hours    MEDICATIONS  (PRN):  acetaminophen   Tablet .. 650 milliGRAM(s) Oral every 6 hours PRN Temp greater or equal to 38C (100.4F), Mild Pain (1 - 3)  dextrose 40% Gel 15 Gram(s) Oral once PRN Blood Glucose LESS THAN 70 milliGRAM(s)/deciliter  glucagon  Injectable 1 milliGRAM(s) IntraMuscular once PRN Glucose LESS THAN 70 milligrams/deciliter  ondansetron Injectable 4 milliGRAM(s) IV Push every 6 hours PRN Nausea  oxyCODONE    IR 5 milliGRAM(s) Oral every 4 hours PRN Moderate Pain (4 - 6)  oxyCODONE    IR 10 milliGRAM(s) Oral every 6 hours PRN Severe Pain (7 - 10)  senna 2 Tablet(s) Oral daily PRN Constipation    Labs:                        11.0   13.69 )-----------( 149      ( 26 Oct 2019 00:00 )             35.4                         11.5   8.68  )-----------( 152      ( 25 Oct 2019 18:30 )             36.6     26 Oct 2019 00:00    135    |  97     |  8      ----------------------------<  159    4.0     |  27     |  0.6    25 Oct 2019 18:30    138    |  100    |  9      ----------------------------<  142    4.0     |  28     |  0.6      Ca    8.6        26 Oct 2019 00:00  Ca    8.7        25 Oct 2019 18:30  Phos  4.0       26 Oct 2019 00:00  Phos  3.2       25 Oct 2019 18:30  Mg     1.6       26 Oct 2019 00:00  Mg     1.7       25 Oct 2019 18:30      PT/INR - ( 26 Oct 2019 00:00 )   PT: 13.90 sec;   INR: 1.21 ratio         PTT - ( 26 Oct 2019 00:00 )  PTT:29.8 sec      General: comfortable, NAD  Neurology: A&Ox3, nonfocal  Head:  Normocephalic, atraumatic  ENT:  Mucosa moist, no ulcerations  Neck:  Supple, no JVD,   Skin: no breakdowns (as per RN)  Resp: CTA B/L  CV: RRR, S1S2,   GI: Soft, NT, bowel sounds        A/P:  64M with PMH of CAD s/p 2 stents, DM, DLD, and HTN who presented with worsening left foot numbness, discoloration, and pain found to have complete occlusion of L superficial femoral artery, now s/p femoral-popliteal bypass with PTFE.    add neurontin to meds for pain control    analgesia    OOB    cardio f/u - [facility] outpt workup    plan as per vascular  DVT prophylaxis  Decubitus prevention- all measures as per RN protocol  Please call or text me with any questions or updates

## 2019-10-27 NOTE — PROGRESS NOTE ADULT - SUBJECTIVE AND OBJECTIVE BOX
Patient's chart reviewed and patient examined by Torsten Reyes MD (contact:[phone])    SUBJ: left leg pain is better    Events in last 24 hours: no chest pain, palp.      MEDICATIONS  (STANDING):  atorvastatin 80 milliGRAM(s) Oral at bedtime  chlorhexidine 4% Liquid 1 Application(s) Topical <User Schedule>  dextrose 5%. 1000 milliLiter(s) (50 mL/Hr) IV Continuous <Continuous>  dextrose 50% Injectable 12.5 Gram(s) IV Push once  dextrose 50% Injectable 25 Gram(s) IV Push once  dextrose 50% Injectable 25 Gram(s) IV Push once  heparin  Injectable 5000 Unit(s) SubCutaneous every 8 hours  insulin glargine Injectable (LANTUS) 18 Unit(s) SubCutaneous at bedtime  insulin lispro (HumaLOG) corrective regimen sliding scale   SubCutaneous three times a day before meals  insulin lispro Injectable (HumaLOG) 8 Unit(s) SubCutaneous three times a day before meals  lisinopril 20 milliGRAM(s) Oral daily  metoprolol tartrate 50 milliGRAM(s) Oral two times a day  pantoprazole    Tablet 40 milliGRAM(s) Oral before breakfast  sodium chloride 0.9% lock flush 3 milliLiter(s) IV Push every 8 hours    MEDICATIONS  (PRN):  acetaminophen   Tablet .. 650 milliGRAM(s) Oral every 6 hours PRN Temp greater or equal to 38C (100.4F), Mild Pain (1 - 3)  dextrose 40% Gel 15 Gram(s) Oral once PRN Blood Glucose LESS THAN 70 milliGRAM(s)/deciliter  glucagon  Injectable 1 milliGRAM(s) IntraMuscular once PRN Glucose LESS THAN 70 milligrams/deciliter  ondansetron Injectable 4 milliGRAM(s) IV Push every 6 hours PRN Nausea  oxyCODONE    IR 5 milliGRAM(s) Oral every 4 hours PRN Moderate Pain (4 - 6)  oxyCODONE    IR 10 milliGRAM(s) Oral every 6 hours PRN Severe Pain (7 - 10)  senna 2 Tablet(s) Oral daily PRN Constipation    Vital Signs Last 24 Hrs  T(C): 36.1 (27 Oct 2019 07:30), Max: 38.2 (27 Oct 2019 00:00)  T(F): 96.9 (27 Oct 2019 07:30), Max: 100.7 (27 Oct 2019 00:00)  HR: 87 (27 Oct 2019 07:30) (86 - 95)  BP: 99/59 (27 Oct 2019 07:30) (99/59 - 146/61)  BP(mean): --  RR: 18 (27 Oct 2019 07:30) (18 - 18)  SpO2: --     REVIEW OF SYSTEMS:  CONSTITUTIONAL: No fever, chills.  CARDIOLOGY: Denies chest pain, shortness of breath or palpitations.   RESPIRATORY: denies cough, shortness of breath, has mild wheezing.   NEUROLOGICAL: Generalized weakness, no focal deficits to report.  GI: no melena/hematochezia.      PHYSICAL EXAM:  · CONSTITUTIONAL:	Well-developed, well nourished M in no distress    ·RESPIRATORY:   breath sounds diminished diffusely;  mild wheeze  · CARDIOVASCULAR	S1 and S2 soft intensity, no rub, gallop or murmur,    ABDOMEN: soft, non-tender, NABS  · EXTREMITIES: No cyanosis, clubbing or edema  NEURO: alert and oriented x 3, no focal deficits.  · VASCULAR: 	Left DP/PT 2+  	  LABS:                        11.0   13.69 )-----------( 149      ( 26 Oct 2019 00:00 )             35.4     10-26    135  |  97<L>  |  8<L>  ----------------------------<  159<H>  4.0   |  27  |  0.6<L>    Ca    8.6      26 Oct 2019 00:00  Phos  4.0     10-26  Mg     1.6     10-26      CARDIAC MARKERS ( 26 Oct 2019 06:27 )  x     / <0.01 ng/mL / 86 U/L / x     / 1.7 ng/mL  CARDIAC MARKERS ( 26 Oct 2019 00:00 )  x     / <0.01 ng/mL / 85 U/L / x     / 1.7 ng/mL  CARDIAC MARKERS ( 25 Oct 2019 18:30 )  x     / <0.01 ng/mL / 68 U/L / x     / 1.7 ng/mL      PT/INR - ( 26 Oct 2019 00:00 )   PT: 13.90 sec;   INR: 1.21 ratio         PTT - ( 26 Oct 2019 00:00 )  PTT:29.8 sec    I&O's Summary    26 Oct 2019 07:01  -  27 Oct 2019 07:00  --------------------------------------------------------  IN: 440 mL / OUT: 1460 mL / NET: -1020 mL    27 Oct 2019 07:01  -  27 Oct 2019 14:07  --------------------------------------------------------  IN: 840 mL / OUT: 400 mL / NET: 440 mL      BNP  RADIOLOGY & ADDITIONAL STUDIES:    IMPRESSION AND PLAN:

## 2019-10-27 NOTE — PROGRESS NOTE ADULT - SUBJECTIVE AND OBJECTIVE BOX
VASCULAR SURGERY PROGRESS NOTE     CORIN HERRERAO  [address] [place] [address] day :5d  POD:  Procedure: Femoral-popliteal bypass graft with non-vein    Surgical Attending: Michale Waldron  Overnight events: Pt doing well post-operatively. Downgraded overnight. Has been OOB. Taylor removed and passed TOV.    T(F): 100.7 (10-27-19 @ 00:00), Max: 100.7 (10-27-19 @ 00:00)  HR: 95 (10-27-19 @ 00:00) (60 - 95)  BP: 135/60 (10-27-19 @ 00:00) (100/50 - 146/61)  ABP: 110/48 (10-26-19 @ 10:00) (110/48 - 142/62)  ABP(mean): 70 (10-26-19 @ 10:00) (70 - 90)  RR: 18 (10-27-19 @ 00:00) (13 - 22)  SpO2: 96% (10-26-19 @ 10:45) (93% - 97%)      10-25-19 @ 07:01  -  10-26-19 @ 07:00  --------------------------------------------------------  IN:    IV PiggyBack: 150 mL    Oral Fluid: 120 mL    sodium chloride 0.9%: 1200 mL  Total IN: 1470 mL    OUT:    Indwelling Catheter - Urethral: 1225 mL  Total OUT: 1225 mL    Total NET: 245 mL      10-26-19 @ 07:01  -  10-27-19 @ 03:49  --------------------------------------------------------  IN:    Oral Fluid: 240 mL    sodium chloride 0.9%: 200 mL  Total IN: 440 mL    OUT:    Indwelling Catheter - Urethral: 585 mL    Voided: 875 mL  Total OUT: 1460 mL    Total NET: -1020 mL        DIET/FLUIDS: sodium chloride 0.9% lock flush 3 milliLiter(s) IV Push every 8 hours    URINE:   10-25-19 @ 07:01  -  10-26-19 @ 07:00  --------------------------------------------------------  OUT: 1225 mL       GI proph:  pantoprazole    Tablet 40 milliGRAM(s) Oral before breakfast    AC/ proph: heparin  Injectable 5000 Unit(s) SubCutaneous every 8 hours    ABx:     PHYSICAL EXAM:  GENERAL: NAD, well-appearing  CHEST/LUNG: Clear to auscultation bilaterally  HEART: Regular rate and rhythm  ABDOMEN: Soft, Nontender, Nondistended;  L groin dressing c/d/i, no erythema, swelling, or pain on palpation  EXTREMITIES: L medial knee dressing c/d/i, TTP, no erythema/swelling    LABS  Labs:  CAPILLARY BLOOD GLUCOSE      POCT Blood Glucose.: 253 mg/dL (26 Oct 2019 20:34)  POCT Blood Glucose.: 192 mg/dL (26 Oct 2019 17:03)  POCT Blood Glucose.: 246 mg/dL (26 Oct 2019 11:31)  POCT Blood Glucose.: 131 mg/dL (26 Oct 2019 06:18)                          11.0   13.69 )-----------( 149      ( 26 Oct 2019 00:00 )             35.4         10-26    135  |  97<L>  |  8<L>  ----------------------------<  159<H>  4.0   |  27  |  0.6<L>  Coags:     13.90  ----< 1.21    ( 26 Oct 2019 00:00 )     29.8        CARDIAC MARKERS ( 26 Oct 2019 06:27 )  x     / <0.01 ng/mL / 86 U/L / x     / 1.7 ng/mL  CARDIAC MARKERS ( 26 Oct 2019 00:00 )  x     / <0.01 ng/mL / 85 U/L / x     / 1.7 ng/mL  CARDIAC MARKERS ( 25 Oct 2019 18:30 )  x     / <0.01 ng/mL / 68 U/L / x     / 1.7 ng/mL

## 2019-10-28 ENCOUNTER — TRANSCRIPTION ENCOUNTER (OUTPATIENT)
Age: 64
End: 2019-10-28

## 2019-10-28 LAB
ANION GAP SERPL CALC-SCNC: 11 MMOL/L — SIGNIFICANT CHANGE UP (ref 7–14)
BUN SERPL-MCNC: 8 MG/DL — LOW (ref 10–20)
CALCIUM SERPL-MCNC: 8.7 MG/DL — SIGNIFICANT CHANGE UP (ref 8.5–10.1)
CHLORIDE SERPL-SCNC: 97 MMOL/L — LOW (ref 98–110)
CO2 SERPL-SCNC: 30 MMOL/L — SIGNIFICANT CHANGE UP (ref 17–32)
CREAT SERPL-MCNC: 0.7 MG/DL — SIGNIFICANT CHANGE UP (ref 0.7–1.5)
GLUCOSE BLDC GLUCOMTR-MCNC: 124 MG/DL — HIGH (ref 70–99)
GLUCOSE BLDC GLUCOMTR-MCNC: 156 MG/DL — HIGH (ref 70–99)
GLUCOSE BLDC GLUCOMTR-MCNC: 215 MG/DL — HIGH (ref 70–99)
GLUCOSE BLDC GLUCOMTR-MCNC: 221 MG/DL — HIGH (ref 70–99)
GLUCOSE SERPL-MCNC: 101 MG/DL — HIGH (ref 70–99)
HCT VFR BLD CALC: 36.1 % — LOW (ref 42–52)
HGB BLD-MCNC: 11.2 G/DL — LOW (ref 14–18)
MCHC RBC-ENTMCNC: 20 PG — LOW (ref 27–31)
MCHC RBC-ENTMCNC: 31 G/DL — LOW (ref 32–37)
MCV RBC AUTO: 64.3 FL — LOW (ref 80–94)
NRBC # BLD: 0 /100 WBCS — SIGNIFICANT CHANGE UP (ref 0–0)
PLATELET # BLD AUTO: 113 K/UL — LOW (ref 130–400)
POTASSIUM SERPL-MCNC: 4.4 MMOL/L — SIGNIFICANT CHANGE UP (ref 3.5–5)
POTASSIUM SERPL-SCNC: 4.4 MMOL/L — SIGNIFICANT CHANGE UP (ref 3.5–5)
RBC # BLD: 5.61 M/UL — SIGNIFICANT CHANGE UP (ref 4.7–6.1)
RBC # FLD: 14.3 % — SIGNIFICANT CHANGE UP (ref 11.5–14.5)
SODIUM SERPL-SCNC: 138 MMOL/L — SIGNIFICANT CHANGE UP (ref 135–146)
WBC # BLD: 11.82 K/UL — HIGH (ref 4.8–10.8)
WBC # FLD AUTO: 11.82 K/UL — HIGH (ref 4.8–10.8)

## 2019-10-28 RX ADMIN — LISINOPRIL 20 MILLIGRAM(S): 2.5 TABLET ORAL at 06:43

## 2019-10-28 RX ADMIN — SODIUM CHLORIDE 3 MILLILITER(S): 9 INJECTION INTRAMUSCULAR; INTRAVENOUS; SUBCUTANEOUS at 06:41

## 2019-10-28 RX ADMIN — PANTOPRAZOLE SODIUM 40 MILLIGRAM(S): 20 TABLET, DELAYED RELEASE ORAL at 07:49

## 2019-10-28 RX ADMIN — HEPARIN SODIUM 5000 UNIT(S): 5000 INJECTION INTRAVENOUS; SUBCUTANEOUS at 13:12

## 2019-10-28 RX ADMIN — Medication 2: at 11:59

## 2019-10-28 RX ADMIN — SODIUM CHLORIDE 3 MILLILITER(S): 9 INJECTION INTRAMUSCULAR; INTRAVENOUS; SUBCUTANEOUS at 20:57

## 2019-10-28 RX ADMIN — SODIUM CHLORIDE 3 MILLILITER(S): 9 INJECTION INTRAMUSCULAR; INTRAVENOUS; SUBCUTANEOUS at 13:11

## 2019-10-28 RX ADMIN — OXYCODONE HYDROCHLORIDE 10 MILLIGRAM(S): 5 TABLET ORAL at 07:49

## 2019-10-28 RX ADMIN — HEPARIN SODIUM 5000 UNIT(S): 5000 INJECTION INTRAVENOUS; SUBCUTANEOUS at 06:48

## 2019-10-28 RX ADMIN — OXYCODONE HYDROCHLORIDE 10 MILLIGRAM(S): 5 TABLET ORAL at 20:59

## 2019-10-28 RX ADMIN — Medication 650 MILLIGRAM(S): at 16:19

## 2019-10-28 RX ADMIN — Medication 50 MILLIGRAM(S): at 06:43

## 2019-10-28 RX ADMIN — ATORVASTATIN CALCIUM 80 MILLIGRAM(S): 80 TABLET, FILM COATED ORAL at 20:59

## 2019-10-28 RX ADMIN — OXYCODONE HYDROCHLORIDE 10 MILLIGRAM(S): 5 TABLET ORAL at 08:19

## 2019-10-28 RX ADMIN — OXYCODONE HYDROCHLORIDE 10 MILLIGRAM(S): 5 TABLET ORAL at 21:22

## 2019-10-28 RX ADMIN — OXYCODONE HYDROCHLORIDE 10 MILLIGRAM(S): 5 TABLET ORAL at 14:38

## 2019-10-28 RX ADMIN — OXYCODONE HYDROCHLORIDE 10 MILLIGRAM(S): 5 TABLET ORAL at 14:08

## 2019-10-28 RX ADMIN — CHLORHEXIDINE GLUCONATE 1 APPLICATION(S): 213 SOLUTION TOPICAL at 06:48

## 2019-10-28 RX ADMIN — Medication 8 UNIT(S): at 11:59

## 2019-10-28 NOTE — PROGRESS NOTE ADULT - ATTENDING COMMENTS
64 year old s/p fem-pop    OOB  PT  pain control
64 year old with PVD    plan for OR today
64 year old with PVd and left rest pain    Will plan for OR on Friday  pre-op  NPO
s/p fem pop    OOB  PT  pain control
s/p fem pop bypass    doing well  pain control  palpable PT pulse  d/c wong and a line  tx to floor
I personally examined this patient with the PA and resident and discussed my plan with them.    the patient had no post-operative complications  he is ntntc, pain is controlled, on home meds  he has a leukocytosis that is likely reactive with no other evidence of infectious process  he has diabetes with hyperglycemia that is treated with iss, glucose under control <180  he can have regular diet and be downgraded to the floor

## 2019-10-28 NOTE — DISCHARGE NOTE NURSING/CASE MANAGEMENT/SOCIAL WORK - PATIENT PORTAL LINK FT
You can access the FollowMyHealth Patient Portal offered by [facility] by registering at the following website: [email or web address]. By joining [facility]’s FollowMyHealth portal, you will also be able to view your health information using other applications (apps) compatible with our system.

## 2019-10-28 NOTE — PHYSICAL THERAPY INITIAL EVALUATION ADULT - GENERAL OBSERVATIONS, REHAB EVAL
9:30-9:55; Pt encountered in b/s chair; agreeable to PT with encouragement. Pt with c/o pain; FARRUKH joseph.

## 2019-10-28 NOTE — CONSULT NOTE ADULT - ASSESSMENT
IMPRESSION: Rehab of   gait abnl, PAD, S/P left fem pop bypass.     PRECAUTIONS: [  ] Cardiac  [  ] Respiratory  [  ] Seizures [  ] Contact Isolation  [  ] Droplet Isolation  [  ] Other    Weight Bearing Status:   WBAT LLE    RECOMMENDATION:  Quitting smoking, compliance with diabetic meds advised.    Out of Bed to Chair     DVT/Decubiti Prophylaxis    REHAB PLAN:     [ xx  ] Bedside P/T 3-5 times a week   [   ]   Bedside O/T  2-3 times a week             [   ] No Rehab Therapy Indicated                   [   ]  Speech Therapy   Conditioning/ROM                                    ADL  Bed Mobility                                               Conditioning/ROM  Transfers                                                     Bed Mobility  Sitting /Standing Balance                         Transfers                                        Gait Training                                               Sitting/Standing Balance  Stair Training [   ]Applicable                    Home equipment Eval                                                                        Splinting  [   ] Only      GOALS:   ADL   [ x  ]   Independent                    Transfers  [ x  ] Independent                          Ambulation  [ x  ] Independent     [ x   ] With device                            [   ]  CG                                                         [   ]  CG                                                                  [   ] CG                            [    ] Min A                                                   [   ] Min A                                                              [   ] Min  A          DISCHARGE PLAN:   [   ]  Good candidate for Intensive Rehabilitation/Hospital based-4A [facility]                                             Will tolerate 3hrs Intensive Rehab Daily                                       [    ]  Short Term Rehab in Skilled Nursing Facility                                       [ xx   ]  Home with Outpatient or VN services in 1-2 days id continues to improve. Home with home PT and a rolling walker.                                         [    ]  Possible Candidate for Intensive Hospital based Rehab

## 2019-10-28 NOTE — CONSULT NOTE ADULT - SUBJECTIVE AND OBJECTIVE BOX
HPI:  65 y/o male with pmhx of CAD s/p PCI 10yrs ago, DM, DLD, HTN presents with worsening left foot numbness, reddish discoloration, coldness and pain for the past 2 weeks which has been worsening in the last 4days. Pain is intermittent, described as sharp and located on the lateral plantar surface, exacerbated by walking and nothing relieves it. There is no radiation of pain, weakness in lower extremities. No pain in hip, thighs, or calves with walking. No c/o chest pain, shortness of breath, nausea, vomiting, change in bowel habit, or abdominal pain.  No similar complaints in the past. He has not seen a vascular surgeon in the past. Patient is a current smoker with a 40 pack year history.   In ED, /76mm Hg, HR 95, Afebrile, Saturating at 97% on room air. Vascular arterial duplex showed Acute occlusion of left superficial femoral artery with severely diminished flow distally. High-grade stenosis left profunda femoris artery. (22 Oct 2019 13:36)      PAST MEDICAL & SURGICAL HISTORY:  CAD (coronary artery disease)  Dyslipidemia  Diabetes  HTN (hypertension)  History of coronary artery stent placement: x 2 stents      Hospital Course:  He admits to only taking Metformin once a day, not tasking ASA and smoking. hospitalized with left LE PAD. He is s/p left fem-pop bypass. Amb 10 ft with RW with sup.   TODAY'S SUBJECTIVE & REVIEW OF SYMPTOMS:     Constitutional WNL   Cardio WNL   Resp WNL   GI WNL  Heme WNL  Endo WNL  Skin WNL  MSK WNL  Neuro WNL  Cognitive WNL  Psych WNL      MEDICATIONS  (STANDING):  atorvastatin 80 milliGRAM(s) Oral at bedtime  chlorhexidine 4% Liquid 1 Application(s) Topical <User Schedule>  dextrose 5%. 1000 milliLiter(s) (50 mL/Hr) IV Continuous <Continuous>  dextrose 50% Injectable 12.5 Gram(s) IV Push once  dextrose 50% Injectable 25 Gram(s) IV Push once  dextrose 50% Injectable 25 Gram(s) IV Push once  heparin  Injectable 5000 Unit(s) SubCutaneous every 8 hours  insulin glargine Injectable (LANTUS) 18 Unit(s) SubCutaneous at bedtime  insulin lispro (HumaLOG) corrective regimen sliding scale   SubCutaneous three times a day before meals  insulin lispro Injectable (HumaLOG) 8 Unit(s) SubCutaneous three times a day before meals  lisinopril 20 milliGRAM(s) Oral daily  metoprolol tartrate 50 milliGRAM(s) Oral two times a day  pantoprazole    Tablet 40 milliGRAM(s) Oral before breakfast  sodium chloride 0.9% lock flush 3 milliLiter(s) IV Push every 8 hours    MEDICATIONS  (PRN):  acetaminophen   Tablet .. 650 milliGRAM(s) Oral every 6 hours PRN Temp greater or equal to 38C (100.4F), Mild Pain (1 - 3)  dextrose 40% Gel 15 Gram(s) Oral once PRN Blood Glucose LESS THAN 70 milliGRAM(s)/deciliter  glucagon  Injectable 1 milliGRAM(s) IntraMuscular once PRN Glucose LESS THAN 70 milligrams/deciliter  ondansetron Injectable 4 milliGRAM(s) IV Push every 6 hours PRN Nausea  oxyCODONE    IR 5 milliGRAM(s) Oral every 4 hours PRN Moderate Pain (4 - 6)  oxyCODONE    IR 10 milliGRAM(s) Oral every 6 hours PRN Severe Pain (7 - 10)  senna 2 Tablet(s) Oral daily PRN Constipation      FAMILY HISTORY:      Allergies    No Known Allergies    Intolerances        SOCIAL HISTORY:    [  ] Etoh  [  ] Smoking  [  ] Substance abuse     Home Environment:  [  ] Home Alone  [x  ] Lives with Family  [  ] Home Health Aid    Dwelling:  [  ] Apartment  [  ] Private House  [  ] Adult Home  [  ] Skilled Nursing Facility      [  ] Short Term  [  ] Long Term  [  ] Stairs       Elevator [  ]    FUNCTIONAL STATUS PTA: (Check all that apply)  Ambulation: [   ]Independent    [  ] Dependent     [  ] Non-Ambulatory  Assistive Device: [  ] SA Cane  [  ]  Q Cane  [  ] Walker  [  ]  Wheelchair  ADL : [  ] Independent  [  ]  Dependent       Vital Signs Last 24 Hrs  T(C): 37.6 (28 Oct 2019 07:30), Max: 38 (27 Oct 2019 16:10)  T(F): 99.6 (28 Oct 2019 07:30), Max: 100.4 (27 Oct 2019 16:10)  HR: 84 (28 Oct 2019 07:30) (84 - 90)  BP: 133/63 (28 Oct 2019 07:30) (102/53 - 141/58)  BP(mean): --  RR: 18 (28 Oct 2019 07:30) (18 - 19)  SpO2: --      PHYSICAL EXAM: Alert & Oriented X3  GENERAL: NAD, well-groomed, well-developed  HEAD:  Atraumatic, Normocephalic  EYES: EOMI, PERRLA, conjunctiva and sclera clear  NECK: Supple, No JVD, Normal thyroid  CHEST/LUNG: Clear to percussion bilaterally; No rales, rhonchi, wheezing, or rubs  HEART: Regular rate and rhythm; No murmurs, rubs, or gallops  ABDOMEN: Soft, Nontender, Nondistended; Bowel sounds present  EXTREMITIES: left foot nicely warm   NERVOUS SYSTEM:  Cranial Nerves 2-12 intact [  ] Abnormal  [  ]  ROM: WFL all extremities [  ]  Abnormal [  ]  Motor Strength: WFL all extremities  [  ]  Abnormal [ x ] LLE 4/5 with pain[  Sensation: intact to light touch [  ] Abnormal [  ]  Reflexes: Symmetric [  ]  Abnormal [  ]    FUNCTIONAL STATUS:  Bed Mobility: Independent [  ]  Supervision [  ]  Needs Assistance [  ]  N/A [  ]  Transfers: Independent [  ]  Supervision [  ]  Needs Assistance [  ]  N/A [  ]   Ambulation: Independent [  ]  Supervision [  ]  Needs Assistance [  ]  N/A [  ]  ADL: Independent [  ] Requires Assistance [  ] N/A [  ]      LABS:                        11.2   11.82 )-----------( 113      ( 28 Oct 2019 04:32 )             36.1     10-28    138  |  97<L>  |  8<L>  ----------------------------<  101<H>  4.4   |  30  |  0.7    Ca    8.7      28 Oct 2019 04:32            RADIOLOGY & ADDITIONAL STUDIES:    Assesment:

## 2019-10-28 NOTE — PROGRESS NOTE ADULT - SUBJECTIVE AND OBJECTIVE BOX
Patient was seen and examined. Spoke with RN. Chart reviewed.  No events overnight.  Vital Signs Last 24 Hrs  T(F): 99.6 (28 Oct 2019 07:30), Max: 100.4 (27 Oct 2019 16:10)  HR: 84 (28 Oct 2019 07:30) (84 - 90)  BP: 133/63 (28 Oct 2019 07:30) (102/53 - 141/58)  SpO2: --  MEDICATIONS  (STANDING):  atorvastatin 80 milliGRAM(s) Oral at bedtime  chlorhexidine 4% Liquid 1 Application(s) Topical <User Schedule>  dextrose 5%. 1000 milliLiter(s) (50 mL/Hr) IV Continuous <Continuous>  dextrose 50% Injectable 12.5 Gram(s) IV Push once  dextrose 50% Injectable 25 Gram(s) IV Push once  dextrose 50% Injectable 25 Gram(s) IV Push once  heparin  Injectable 5000 Unit(s) SubCutaneous every 8 hours  insulin glargine Injectable (LANTUS) 18 Unit(s) SubCutaneous at bedtime  insulin lispro (HumaLOG) corrective regimen sliding scale   SubCutaneous three times a day before meals  insulin lispro Injectable (HumaLOG) 8 Unit(s) SubCutaneous three times a day before meals  lisinopril 20 milliGRAM(s) Oral daily  metoprolol tartrate 50 milliGRAM(s) Oral two times a day  pantoprazole    Tablet 40 milliGRAM(s) Oral before breakfast  sodium chloride 0.9% lock flush 3 milliLiter(s) IV Push every 8 hours    MEDICATIONS  (PRN):  acetaminophen   Tablet .. 650 milliGRAM(s) Oral every 6 hours PRN Temp greater or equal to 38C (100.4F), Mild Pain (1 - 3)  dextrose 40% Gel 15 Gram(s) Oral once PRN Blood Glucose LESS THAN 70 milliGRAM(s)/deciliter  glucagon  Injectable 1 milliGRAM(s) IntraMuscular once PRN Glucose LESS THAN 70 milligrams/deciliter  ondansetron Injectable 4 milliGRAM(s) IV Push every 6 hours PRN Nausea  oxyCODONE    IR 5 milliGRAM(s) Oral every 4 hours PRN Moderate Pain (4 - 6)  oxyCODONE    IR 10 milliGRAM(s) Oral every 6 hours PRN Severe Pain (7 - 10)  senna 2 Tablet(s) Oral daily PRN Constipation    Labs:                        11.2   11.82 )-----------( 113      ( 28 Oct 2019 04:32 )             36.1     28 Oct 2019 04:32    138    |  97     |  8      ----------------------------<  101    4.4     |  30     |  0.7      Ca    8.7        28 Oct 2019 04:32            General: comfortable, NAD  Neurology: A&Ox3, nonfocal  Head:  Normocephalic, atraumatic  ENT:  Mucosa moist, no ulcerations  Neck:  Supple, no JVD,   Skin: no breakdowns (as per RN)  Resp: CTA B/L  CV: RRR, S1S2,   GI: Soft, NT, bowel sounds        A/P:  64M with PMH of CAD s/p 2 stents, DM, DLD, and HTN who presented with worsening left foot numbness, discoloration, and pain found to have complete occlusion of L superficial femoral artery, now s/p femoral-popliteal bypass with PTFE.    add neurontin to meds for pain control    analgesia prn    PT/rehab    OOB    cardio f/u - priscila outpt workup    plan as per vascular    DC planning    DVT prophylaxis  Decubitus prevention- all measures as per RN protocol  Please call or text me with any questions or updates

## 2019-10-28 NOTE — PROGRESS NOTE ADULT - SUBJECTIVE AND OBJECTIVE BOX
GENERAL SURGERY PROGRESS NOTE     PORFIRIO HERRERA  64y  Male  Hospital day :6d  POD:  Procedure: Femoral-popliteal bypass graft with non-vein    OVERNIGHT EVENTS: No acute events. Patient ambulating to washroom. Complains of pain with movement.     T(F): 99.5 (10-28-19 @ 00:00), Max: 100.4 (10-27-19 @ 16:10)  HR: 87 (10-28-19 @ 00:00) (87 - 90)  BP: 102/53 (10-28-19 @ 00:00) (99/59 - 123/58)  ABP: --  ABP(mean): --  RR: 18 (10-28-19 @ 00:00) (18 - 18)  SpO2: --    DIET/FLUIDS: dextrose 5%. 1000 milliLiter(s) IV Continuous <Continuous>  sodium chloride 0.9% lock flush 3 milliLiter(s) IV Push every 8 hours    URINE:   10-26-19 @ 07:01  -  10-27-19 @ 07:00  --------------------------------------------------------  OUT: 585 mL       GI proph:  pantoprazole    Tablet 40 milliGRAM(s) Oral before breakfast    AC/ proph: heparin  Injectable 5000 Unit(s) SubCutaneous every 8 hours    ABx:     PHYSICAL EXAM:  GENERAL: NAD, well-appearing  CHEST/LUNG: Clear to auscultation bilaterally  HEART: Regular rate and rhythm  ABDOMEN: Soft, Nontender, Nondistended;  dressing in left groin is dry and intact, no hematoma or swelling,   EXTREMITIES: L medial knee dressing dry and intact, TTP, no erythema/swelling    LABS  Labs:  CAPILLARY BLOOD GLUCOSE      POCT Blood Glucose.: 137 mg/dL (27 Oct 2019 21:35)  POCT Blood Glucose.: 183 mg/dL (27 Oct 2019 16:38)  POCT Blood Glucose.: 168 mg/dL (27 Oct 2019 12:07)  POCT Blood Glucose.: 223 mg/dL (27 Oct 2019 08:25)        CARDIAC MARKERS ( 26 Oct 2019 06:27 )  x     / <0.01 ng/mL / 86 U/L / x     / 1.7 ng/mL        RADIOLOGY & ADDITIONAL TESTS:  No new studies

## 2019-10-29 ENCOUNTER — TRANSCRIPTION ENCOUNTER (OUTPATIENT)
Age: 64
End: 2019-10-29

## 2019-10-29 VITALS
TEMPERATURE: 100 F | SYSTOLIC BLOOD PRESSURE: 117 MMHG | DIASTOLIC BLOOD PRESSURE: 56 MMHG | HEART RATE: 88 BPM | RESPIRATION RATE: 18 BRPM

## 2019-10-29 LAB
GLUCOSE BLDC GLUCOMTR-MCNC: 206 MG/DL — HIGH (ref 70–99)
GLUCOSE BLDC GLUCOMTR-MCNC: 230 MG/DL — HIGH (ref 70–99)

## 2019-10-29 RX ORDER — OXYCODONE HYDROCHLORIDE 5 MG/1
1 TABLET ORAL
Qty: 15 | Refills: 0
Start: 2019-10-29

## 2019-10-29 RX ORDER — SENNA PLUS 8.6 MG/1
2 TABLET ORAL
Qty: 0 | Refills: 0 | DISCHARGE
Start: 2019-10-29

## 2019-10-29 RX ADMIN — OXYCODONE HYDROCHLORIDE 10 MILLIGRAM(S): 5 TABLET ORAL at 13:05

## 2019-10-29 RX ADMIN — LISINOPRIL 20 MILLIGRAM(S): 2.5 TABLET ORAL at 06:39

## 2019-10-29 RX ADMIN — PANTOPRAZOLE SODIUM 40 MILLIGRAM(S): 20 TABLET, DELAYED RELEASE ORAL at 06:39

## 2019-10-29 RX ADMIN — OXYCODONE HYDROCHLORIDE 10 MILLIGRAM(S): 5 TABLET ORAL at 06:38

## 2019-10-29 RX ADMIN — SODIUM CHLORIDE 3 MILLILITER(S): 9 INJECTION INTRAMUSCULAR; INTRAVENOUS; SUBCUTANEOUS at 06:39

## 2019-10-29 RX ADMIN — Medication 50 MILLIGRAM(S): at 06:38

## 2019-10-29 NOTE — PROGRESS NOTE ADULT - ASSESSMENT
Acute Left leg arterial insufficiency with severe tandem disease of the left internal iliac artery, complete occlusion of the left superficial femoral artery at the level below the femoral neck, extending to the level of the mid thigh.     -being scheduled for arterial bypass - for Friday 10/25/19.  continue Heparin and Aspirin    CAD - stable.    COPD    HTN controlled.  continue Lisinopril and metoprolol     Hyperlipidemia  - continue Atorvastatin     Pt is at elevated risk for doroteo-op cardiac complication and continue ASA, Metoprolol, Statin.
63 y/o male with pmhx of CAD s/p 2 stents, DM, DLD, HTN presents with worsening left foot numbness, discolouration, and pain. He has not had these symptoms before. Duplex demonstrates occlusion of left superficial femoral artery with diminished flow distally and high grade stenosis of left profunda femoris.     Plan:   -left femoral to popliteal artery  by pass Friday  -pre-op   - stress test this am  -NPO at [facility]
64M with PMH of CAD s/p 2 stents, DM, DLD, and HTN who presented with worsening left foot numbness, discoloration, and pain found to have complete occlusion of L superficial femoral artery, now s/p femoral-popliteal bypass with PTFE.    Plan:   - care per SICU  - monitor BP: has been normotensive and normocardic  - monitor left groin and medial knee sites for hematoma, etc  - Q1H vascular checks
64M with PMH of CAD s/p 2 stents, DM, DLD, and HTN who presented with worsening left foot numbness, discoloration, and pain found to have complete occlusion of L superficial femoral artery, now s/p femoral-popliteal bypass with PTFE.    Plan:   - remove dressing 10/28   - pain control   - PT
64M with PMH of CAD s/p 2 stents, DM, DLD, and HTN who presented with worsening left foot numbness, discoloration, and pain found to have complete occlusion of L superficial femoral artery, now s/p femoral-popliteal bypass with PTFE.    Plan:  pain control  oob as tolerated  Regular diet  dvt ppx  IS use 10x/hr
65 y/o male with pmhx of CAD s/p 2 stents, DM, DLD, HTN presents with worsening left foot numbness, discolouration, and pain. He has not had these symptoms before. Duplex demonstrates occlusion of left superficial femoral artery with diminished flow distally and high grade stenosis of left profunda femoris. Pre-op'd for OR today.     Plan:   - Cards: NTD for + stress test  - heparin drip off since 6am   - OR today for left femoral popliteal bypass
Assessment & Plan    64y Male with PMHx/PSHx of CAD/PVD s/p PCI in 2009 x2 stents (f/u with Dr. Bucio), DM, HLD, HTN, COPD, anxiety, and smoker 50 pack years s/p s/p LLE femoral to above knee popliteal artery bypass with PTFE upgraded to SICU for q1 vascular checks.     NEURO:   Pain-controlled with Tylenol 1g IV x1, Oxy PRN, Tylenol PRN  Nausea: Zofran    RESP:   Hx of COPD, and 50 pack year smoking history  Satting >92% on NC    CARDS:   HTN: On Lisinopril 20 QD and metroprolol 50 BID  HLD: Atorvastatin 80 QHS  EKG unchanged from preop    GI/NUTR:   Diet, Clear Liquid  Bowel regimen: Senna  PPI    /RENAL:  NS @ 100 cc/hr  Taylor    HEME/ONC:   H/H stable  HSQ    ID:   Afebrile. No leukocytosis  Periop Ancef 2 g    ENDO:  DM on ISS
Left lower extremity arterial insufficiency.        s/p Left Fem POP bypass, doing well.    CAD - hx of stents       discussed importance of smoking cessation, taking medications regularly.     Continue ASA, beta blocker and statin.      Type 2 DM,      Monitor blood sugar    severe COPD.    Will follow prn
Left lower extremity arterial insufficiency.        s/p Left Fem POP bypass.    CAD - hx of stents without recurrence of angina.  Pt has been non compliant with medications such as ASA, statins.      Stress test revealed mild to moderate  ischemia involving the lateral wall, he has chronic  total occlusion of LCX by previous cardiac cath,      Continue ASA, beta blocker and statin.  No post op angina or signs of ischemia.    Type 2 DM,      Monitor blood sugar    severe COPD.
left supf fem artery insuff  stenosis of left profunda femoris  cad/pci/stents  dm  dyslip  htn    stress test positive  cardio apprec  vasc fu  discussed with ho  high risk for high risk procedure
left supf fem artery insuff  stenosis of left profunda femoris  cad/pci/stents  dm  dyslip  htn    stress test positive  cardio dr byers  vaslio fu  discussed with ho    pt may need preop cath    high risk for high risk procedure
pain management  dvt gi prophylaxis  regular diet   trend fever curve   encourage ambulation   incentive spirometry  dispo planning

## 2019-10-29 NOTE — DIETITIAN INITIAL EVALUATION ADULT. - OTHER INFO
P/w: Left foot numbness and coldness. Pt. found to have complete occlusion of L superficial femoral artery, now s/p femoral-popliteal bypass with PTFE. OOB to chair. Pain control.

## 2019-10-29 NOTE — DISCHARGE NOTE PROVIDER - CARE PROVIDER_API CALL
Jasvir Moya)  Surgery; Vascular Surgery  [address] [place] [address]  Phone: [phone]  Fax: [phone]  Follow Up Time:

## 2019-10-29 NOTE — DIETITIAN INITIAL EVALUATION ADULT. - ENERGY NEEDS
calorie 1799-1962kcal (MSJ x 1.1-1.2 AF) for overweight pt.  protein 84-92g (1-1.1g/kg CBW) as above  fluid 1ml/kcal or per LIP

## 2019-10-29 NOTE — DIETITIAN INITIAL EVALUATION ADULT. - DIET TYPE
Pt. reports regular diet, good appetite PTP. NKFA. Takes MVI daily. Stable weight trends. No cultural/Christian dietary restr.

## 2019-10-29 NOTE — PROGRESS NOTE ADULT - REASON FOR ADMISSION
Left foot numbness and coldness
Left foot acute arterial insufficiency
Left foot numbness and coldness

## 2019-10-29 NOTE — DISCHARGE NOTE PROVIDER - NSDCCPCAREPLAN_GEN_ALL_CORE_FT
PRINCIPAL DISCHARGE DIAGNOSIS  Diagnosis: Vascular occlusion  Assessment and Plan of Treatment: rest pain, SFA occlusion

## 2019-10-29 NOTE — DIETITIAN INITIAL EVALUATION ADULT. - CONTINUE CURRENT NUTRITION CARE PLAN
Continue consistent carb w/snack diet order. Pt. to continue to consume % PO at meals over the next 7 days./yes

## 2019-10-29 NOTE — PROGRESS NOTE ADULT - PROVIDER SPECIALTY LIST ADULT
Cardiology
Internal Medicine
SICU
Vascular Surgery
Cardiology

## 2019-10-29 NOTE — DISCHARGE NOTE PROVIDER - NSDCFUADDINST_GEN_ALL_CORE_FT
Go to ED with fevers, chills, oozing, cold extremity, swelling.   Call Dr. Moya's office with any questions

## 2019-10-29 NOTE — PROGRESS NOTE ADULT - SUBJECTIVE AND OBJECTIVE BOX
Procedure: Femoral-popliteal bypass graft with non-vein  Patient is a 64y old  Male who presents with a chief complaint of Left foot numbness and coldness (28 Oct 2019 11:39)    PAST MEDICAL & SURGICAL HISTORY:  CAD (coronary artery disease)  Dyslipidemia  Diabetes  HTN (hypertension)  History of coronary artery stent placement: x 2 stents      Events of the Last 24h:none  Vital Signs Last 24 Hrs  T(C): 36.8 (28 Oct 2019 23:47), Max: 38 (28 Oct 2019 15:55)  T(F): 98.3 (28 Oct 2019 23:47), Max: 100.4 (28 Oct 2019 15:55)  HR: 86 (28 Oct 2019 23:47) (84 - 94)  BP: 121/63 (28 Oct 2019 23:47) (94/54 - 141/58)  BP(mean): --  RR: 18 (28 Oct 2019 23:47) (18 - 19)  SpO2: --        Diet, Regular:   Consistent Carbohydrate Evening Snack (10-26-19 @ 08:32)      I&O's Summary    27 Oct 2019 07:01  -  28 Oct 2019 07:00  --------------------------------------------------------  IN: 840 mL / OUT: 1100 mL / NET: -260 mL    28 Oct 2019 07:01  -  29 Oct 2019 01:50  --------------------------------------------------------  IN: 0 mL / OUT: 975 mL / NET: -975 mL     I&O's Detail    27 Oct 2019 07:01  -  28 Oct 2019 07:00  --------------------------------------------------------  IN:    Oral Fluid: 840 mL  Total IN: 840 mL    OUT:    Voided: 1100 mL  Total OUT: 1100 mL    Total NET: -260 mL      28 Oct 2019 07:01  -  29 Oct 2019 01:50  --------------------------------------------------------  IN:  Total IN: 0 mL    OUT:    Voided: 975 mL  Total OUT: 975 mL    Total NET: -975 mL          MEDICATIONS  (STANDING):  atorvastatin 80 milliGRAM(s) Oral at bedtime  chlorhexidine 4% Liquid 1 Application(s) Topical <User Schedule>  dextrose 5%. 1000 milliLiter(s) (50 mL/Hr) IV Continuous <Continuous>  dextrose 50% Injectable 12.5 Gram(s) IV Push once  dextrose 50% Injectable 25 Gram(s) IV Push once  dextrose 50% Injectable 25 Gram(s) IV Push once  heparin  Injectable 5000 Unit(s) SubCutaneous every 8 hours  insulin glargine Injectable (LANTUS) 18 Unit(s) SubCutaneous at bedtime  insulin lispro (HumaLOG) corrective regimen sliding scale   SubCutaneous three times a day before meals  insulin lispro Injectable (HumaLOG) 8 Unit(s) SubCutaneous three times a day before meals  lisinopril 20 milliGRAM(s) Oral daily  metoprolol tartrate 50 milliGRAM(s) Oral two times a day  pantoprazole    Tablet 40 milliGRAM(s) Oral before breakfast  sodium chloride 0.9% lock flush 3 milliLiter(s) IV Push every 8 hours    MEDICATIONS  (PRN):  acetaminophen   Tablet .. 650 milliGRAM(s) Oral every 6 hours PRN Temp greater or equal to 38C (100.4F), Mild Pain (1 - 3)  dextrose 40% Gel 15 Gram(s) Oral once PRN Blood Glucose LESS THAN 70 milliGRAM(s)/deciliter  glucagon  Injectable 1 milliGRAM(s) IntraMuscular once PRN Glucose LESS THAN 70 milligrams/deciliter  ondansetron Injectable 4 milliGRAM(s) IV Push every 6 hours PRN Nausea  oxyCODONE    IR 5 milliGRAM(s) Oral every 4 hours PRN Moderate Pain (4 - 6)  oxyCODONE    IR 10 milliGRAM(s) Oral every 6 hours PRN Severe Pain (7 - 10)  senna 2 Tablet(s) Oral daily PRN Constipation      PHYSICAL EXAM:    GENERAL: NAD    HEENT: NCAT    CHEST/LUNGS: CTAB    HEART: RRR,  No murmurs, rubs, or gallops    ABDOMEN: SNTND +BS    EXTREMITIES:  FROM, No clubbing, cyanosis, or edema, palpable pulse    NEURO: No focal neurological deficits    SKIN: No rashes or lesions    INCISION/WOUNDS:                          11.2   11.82 )-----------( 113      ( 28 Oct 2019 04:32 )             36.1        CBC Full  -  ( 28 Oct 2019 04:32 )  WBC Count : 11.82 K/uL  RBC Count : 5.61 M/uL  Hemoglobin : 11.2 g/dL  Hematocrit : 36.1 %  Platelet Count - Automated : 113 K/uL  Mean Cell Volume : 64.3 fL  Mean Cell Hemoglobin : 20.0 pg  Mean Cell Hemoglobin Concentration : 31.0 g/dL  Auto Neutrophil # : x  Auto Lymphocyte # : x  Auto Monocyte # : x  Auto Eosinophil # : x  Auto Basophil # : x  Auto Neutrophil % : x  Auto Lymphocyte % : x  Auto Monocyte % : x  Auto Eosinophil % : x  Auto Basophil % : x               138   |  97    |  8                  Ca: 8.7    BMP:   ----------------------------< 101    Mg: x     (10-28-19 @ 04:32)             4.4    |  30    | 0.7                Ph: x        LFT:     TPro: 6.7 / Alb: 4.3 / TBili: 0.8 / DBili: x / AST: 27 / ALT: 35 / AlkPhos: 69   (10-23-19 @ 05:25)

## 2019-10-29 NOTE — DISCHARGE NOTE PROVIDER - HOSPITAL COURSE
64y Male with PMHx/PSHx of CAD/PVD s/p PCI in 2009 x2 stents (f/u with Dr. Bucio), DM, HLD, HTN, COPD, anxiety, and smoker 50 pack years s/p s/p LLE femoral to above knee popliteal artery bypass with PTFE upgraded to SICU for q1 vascular checks.  As as side note, patient had cervical herniated disc that he received cortisone injections for, with last dose in September. He was admitted through the ED 10/22 for worsening left foot numbness, dusky discoloration, coldness and pain x2 weeks which worsened in the last 4 days. He could only walk 1 block. CTA of abd/aorta w/runoff showed complete occlusion of L superficial femoral artery at level below the femoral neck, extending to level of the mid thigh.  The popliteal artery was patent with intact two vessel runoff to the left ankle and foot. There was severely diseased anterior tibial artery at the level midshin down to the ankle although DP artery is patent.  Patient was monitored on the floor between 10/22/ and 10/25 getting pre-op workup, placed on heparin gtt, and found to have positive stress test. As per cardiology, the stress test results were consistent with his known CAD. He underwent left fem to above the knee popliteal bypass 10/25/19. Intraop, patient was given 400 mcg Fentanyl, 5000 u Heparin, not reversed and 2 g of Ancef, 4 of Decadron.  He was hypotensive intraop due to sedation and had a 1hr course of deshaun post induction. He stayed in ICU for observation. Downgraded 10/26/19. He ambulated with physical therapy. Deemed stable for discharge 10/29/19

## 2019-10-30 ENCOUNTER — INBOUND DOCUMENT (OUTPATIENT)
Age: 64
End: 2019-10-30

## 2019-10-31 DIAGNOSIS — J44.9 CHRONIC OBSTRUCTIVE PULMONARY DISEASE, UNSPECIFIED: ICD-10-CM

## 2019-10-31 DIAGNOSIS — F41.9 ANXIETY DISORDER, UNSPECIFIED: ICD-10-CM

## 2019-10-31 DIAGNOSIS — I95.89 OTHER HYPOTENSION: ICD-10-CM

## 2019-10-31 DIAGNOSIS — E78.5 HYPERLIPIDEMIA, UNSPECIFIED: ICD-10-CM

## 2019-10-31 DIAGNOSIS — E11.51 TYPE 2 DIABETES MELLITUS WITH DIABETIC PERIPHERAL ANGIOPATHY WITHOUT GANGRENE: ICD-10-CM

## 2019-10-31 DIAGNOSIS — Z95.5 PRESENCE OF CORONARY ANGIOPLASTY IMPLANT AND GRAFT: ICD-10-CM

## 2019-10-31 DIAGNOSIS — I77.1 STRICTURE OF ARTERY: ICD-10-CM

## 2019-10-31 DIAGNOSIS — I25.10 ATHEROSCLEROTIC HEART DISEASE OF NATIVE CORONARY ARTERY WITHOUT ANGINA PECTORIS: ICD-10-CM

## 2019-10-31 DIAGNOSIS — F17.210 NICOTINE DEPENDENCE, CIGARETTES, UNCOMPLICATED: ICD-10-CM

## 2019-10-31 DIAGNOSIS — R20.0 ANESTHESIA OF SKIN: ICD-10-CM

## 2019-10-31 DIAGNOSIS — I10 ESSENTIAL (PRIMARY) HYPERTENSION: ICD-10-CM

## 2019-10-31 DIAGNOSIS — Z79.84 LONG TERM (CURRENT) USE OF ORAL HYPOGLYCEMIC DRUGS: ICD-10-CM

## 2019-10-31 DIAGNOSIS — I70.222 ATHEROSCLEROSIS OF NATIVE ARTERIES OF EXTREMITIES WITH REST PAIN, LEFT LEG: ICD-10-CM

## 2019-11-01 PROBLEM — E78.5 HYPERLIPIDEMIA, UNSPECIFIED: Chronic | Status: ACTIVE | Noted: 2019-10-22

## 2019-11-01 PROBLEM — I10 ESSENTIAL (PRIMARY) HYPERTENSION: Chronic | Status: ACTIVE | Noted: 2019-10-22

## 2019-11-01 PROBLEM — I25.10 ATHEROSCLEROTIC HEART DISEASE OF NATIVE CORONARY ARTERY WITHOUT ANGINA PECTORIS: Chronic | Status: ACTIVE | Noted: 2019-10-22

## 2019-11-01 PROBLEM — E11.9 TYPE 2 DIABETES MELLITUS WITHOUT COMPLICATIONS: Chronic | Status: ACTIVE | Noted: 2019-10-22

## 2019-11-01 RX ORDER — CEPHALEXIN 500 MG
1 CAPSULE ORAL
Qty: 20 | Refills: 1
Start: 2019-11-01 | End: 2019-11-20

## 2019-11-04 ENCOUNTER — APPOINTMENT (OUTPATIENT)
Dept: VASCULAR SURGERY | Facility: CLINIC | Age: 64
End: 2019-11-04
Payer: MEDICARE

## 2019-11-04 VITALS
HEIGHT: 70 IN | DIASTOLIC BLOOD PRESSURE: 70 MMHG | SYSTOLIC BLOOD PRESSURE: 130 MMHG | BODY MASS INDEX: 26.48 KG/M2 | WEIGHT: 185 LBS

## 2019-11-04 PROCEDURE — 99024 POSTOP FOLLOW-UP VISIT: CPT

## 2019-11-04 RX ORDER — ATORVASTATIN CALCIUM 20 MG/1
20 TABLET, FILM COATED ORAL
Refills: 0 | Status: ACTIVE | COMMUNITY

## 2019-11-04 RX ORDER — METOPROLOL TARTRATE 50 MG/1
50 TABLET, FILM COATED ORAL
Refills: 0 | Status: ACTIVE | COMMUNITY

## 2019-11-04 RX ORDER — METFORMIN HYDROCHLORIDE 500 MG/1
500 TABLET, COATED ORAL
Refills: 0 | Status: ACTIVE | COMMUNITY

## 2019-11-04 NOTE — CONSULT LETTER
[Dear  ___] : Dear  [unfilled], Courtesy Letter:] : I had the pleasure of seeing your patient, [unfilled], in my office today. [Please see my note below.] : Please see my note below. 

## 2019-11-04 NOTE — ASSESSMENT
[FreeTextEntry1] : 65 y/o gentleman with h/o DM, smoking who presented to [facility] with ischemic left foot, had coldness and numbness that was worsening for about 2 weeks prior, and underwent left femoral to ak popliteal bypass with PTFE graft on 10/25/19. He was sent in by [facility] for redness around the distal thigh surgical incision, denies any fevers, chills or pain, is on Keflex for the past 2 days. He is ambulating better.\par I have advised him to complete oral antibiotics and apply Bacitracin to the incision and I will see him back in 2 weeks time.\par Prescription for Percocet sent for post-operative pain (21 pills, no refills), I-STOP records reviewed today.

## 2019-11-04 NOTE — HISTORY OF PRESENT ILLNESS
[FreeTextEntry1] : 65 y/o gentleman with h/o DM, smoking who presented to [facility] with ischemic left foot, had coldness and numbness that was worsening for about 2 weeks prior, and underwent left femoral to ak popliteal bypass with PTFE graft on 10/25/19. He was sent in by [facility] for redness around the distal thigh surgical incision, denies any fevers, chills or pain, is on Keflex for the past 2 days.

## 2019-11-18 ENCOUNTER — APPOINTMENT (OUTPATIENT)
Dept: VASCULAR SURGERY | Facility: CLINIC | Age: 64
End: 2019-11-18
Payer: MEDICARE

## 2019-11-18 DIAGNOSIS — I70.222 ATHEROSCLEROSIS OF NATIVE ARTERIES OF EXTREMITIES WITH REST PAIN, LEFT LEG: ICD-10-CM

## 2019-11-18 PROCEDURE — 99024 POSTOP FOLLOW-UP VISIT: CPT

## 2019-11-18 PROCEDURE — 93926 LOWER EXTREMITY STUDY: CPT

## 2019-11-18 NOTE — ASSESSMENT
[FreeTextEntry1] : 65 y/o gentleman with h/o DM, smoking who presented to [facility] with ischemic left foot, had coldness and numbness that was worsening for about 2 weeks prior, and underwent left femoral to above-knee popliteal bypass with PTFE graft on 10/25/19. He is ambulating better. He c/o numbness in the feet, which may be neuropathy, denies any foot pain.\par I performed an arterial duplex which was medically necessary to evaluate for patency of the lower extremity bypass graft. It showed patent left femoral to above-knee popliteal artery PTFE bypass without any significant disease.\par I have informed him of the test results and advised follow up in 3 months time.

## 2019-11-18 NOTE — HISTORY OF PRESENT ILLNESS
[FreeTextEntry1] : 63 y/o gentleman with h/o DM, smoking who presented to [facility] with ischemic left foot, had coldness and numbness that was worsening for about 2 weeks prior, and underwent left femoral to ak popliteal bypass with PTFE graft on 10/25/19.

## 2019-11-18 NOTE — DATA REVIEWED
[FreeTextEntry1] : I performed an arterial duplex which was medically necessary to evaluate for patency of the lower extremity bypass graft. It showed patent left femoral to ak popliteal artery PTFE bypass without any significant disease.

## 2020-02-13 NOTE — ED ADULT NURSE NOTE - NSSEPSISCRITERIAMET_ED_A_ED
Send patient a course of antibiotics.  If the sore throat is still present in about 10 days to follow-up.   Abnormal Lactate

## 2020-06-30 DIAGNOSIS — I70.512: ICD-10-CM

## 2020-07-06 ENCOUNTER — APPOINTMENT (OUTPATIENT)
Dept: VASCULAR SURGERY | Facility: CLINIC | Age: 65
End: 2020-07-06

## 2020-08-24 ENCOUNTER — APPOINTMENT (OUTPATIENT)
Dept: NEUROSURGERY | Facility: CLINIC | Age: 65
End: 2020-08-24

## 2021-03-20 NOTE — DIETITIAN INITIAL EVALUATION ADULT. - WEIGHT IN LBS
Status post PCI x1 in 2017  Patient complaining of some exertional shortness of breath  Serial cardiac enzymes were negative  Cardiology consult appreciated  2D echo showed EF of 50 for 60% without any regional wall motion abnormalities, grade 1 diastolic dysfunction without any right left shin 184.9

## 2022-01-01 ENCOUNTER — APPOINTMENT (OUTPATIENT)
Dept: INFUSION THERAPY | Facility: CLINIC | Age: 67
End: 2022-01-01

## 2022-01-01 ENCOUNTER — APPOINTMENT (OUTPATIENT)
Dept: HEMATOLOGY ONCOLOGY | Facility: CLINIC | Age: 67
End: 2022-01-01

## 2022-01-01 ENCOUNTER — NON-APPOINTMENT (OUTPATIENT)
Age: 67
End: 2022-01-01

## 2022-01-01 ENCOUNTER — TRANSCRIPTION ENCOUNTER (OUTPATIENT)
Age: 67
End: 2022-01-01

## 2022-01-01 ENCOUNTER — OUTPATIENT (OUTPATIENT)
Dept: OUTPATIENT SERVICES | Facility: HOSPITAL | Age: 67
LOS: 1 days | Discharge: HOME | End: 2022-01-01

## 2022-01-01 ENCOUNTER — APPOINTMENT (OUTPATIENT)
Dept: PULMONOLOGY | Facility: CLINIC | Age: 67
End: 2022-01-01

## 2022-01-01 ENCOUNTER — LABORATORY RESULT (OUTPATIENT)
Age: 67
End: 2022-01-01

## 2022-01-01 ENCOUNTER — RESULT REVIEW (OUTPATIENT)
Age: 67
End: 2022-01-01

## 2022-01-01 ENCOUNTER — RX RENEWAL (OUTPATIENT)
Age: 67
End: 2022-01-01

## 2022-01-01 ENCOUNTER — INPATIENT (INPATIENT)
Facility: HOSPITAL | Age: 67
LOS: 8 days | Discharge: HOME | End: 2022-07-06
Attending: STUDENT IN AN ORGANIZED HEALTH CARE EDUCATION/TRAINING PROGRAM | Admitting: STUDENT IN AN ORGANIZED HEALTH CARE EDUCATION/TRAINING PROGRAM

## 2022-01-01 ENCOUNTER — OUTPATIENT (OUTPATIENT)
Dept: OUTPATIENT SERVICES | Facility: HOSPITAL | Age: 67
LOS: 1 days | End: 2022-01-01

## 2022-01-01 VITALS
DIASTOLIC BLOOD PRESSURE: 71 MMHG | TEMPERATURE: 98.1 F | HEIGHT: 69 IN | HEART RATE: 106 BPM | SYSTOLIC BLOOD PRESSURE: 145 MMHG | WEIGHT: 162 LBS | RESPIRATION RATE: 14 BRPM | BODY MASS INDEX: 23.99 KG/M2

## 2022-01-01 VITALS
WEIGHT: 169 LBS | RESPIRATION RATE: 14 BRPM | HEIGHT: 70 IN | TEMPERATURE: 98.1 F | SYSTOLIC BLOOD PRESSURE: 128 MMHG | BODY MASS INDEX: 24.2 KG/M2 | HEART RATE: 105 BPM | DIASTOLIC BLOOD PRESSURE: 73 MMHG

## 2022-01-01 VITALS
DIASTOLIC BLOOD PRESSURE: 67 MMHG | HEIGHT: 69.5 IN | TEMPERATURE: 98.7 F | SYSTOLIC BLOOD PRESSURE: 134 MMHG | BODY MASS INDEX: 24.47 KG/M2 | WEIGHT: 169 LBS | HEART RATE: 101 BPM | OXYGEN SATURATION: 92 %

## 2022-01-01 VITALS
DIASTOLIC BLOOD PRESSURE: 70 MMHG | HEART RATE: 76 BPM | WEIGHT: 185 LBS | SYSTOLIC BLOOD PRESSURE: 110 MMHG | HEIGHT: 70 IN | OXYGEN SATURATION: 94 % | BODY MASS INDEX: 26.48 KG/M2

## 2022-01-01 VITALS
HEART RATE: 101 BPM | HEIGHT: 70 IN | RESPIRATION RATE: 14 BRPM | SYSTOLIC BLOOD PRESSURE: 136 MMHG | HEIGHT: 70 IN | DIASTOLIC BLOOD PRESSURE: 92 MMHG | RESPIRATION RATE: 17 BRPM | WEIGHT: 171 LBS | SYSTOLIC BLOOD PRESSURE: 168 MMHG | OXYGEN SATURATION: 97 % | BODY MASS INDEX: 24.48 KG/M2 | HEART RATE: 116 BPM | DIASTOLIC BLOOD PRESSURE: 64 MMHG | TEMPERATURE: 98 F | TEMPERATURE: 98.1 F | OXYGEN SATURATION: 98 %

## 2022-01-01 VITALS
TEMPERATURE: 97.4 F | DIASTOLIC BLOOD PRESSURE: 59 MMHG | HEART RATE: 92 BPM | BODY MASS INDEX: 24.59 KG/M2 | SYSTOLIC BLOOD PRESSURE: 131 MMHG | WEIGHT: 166 LBS | HEIGHT: 69 IN | RESPIRATION RATE: 14 BRPM

## 2022-01-01 VITALS
OXYGEN SATURATION: 94 % | DIASTOLIC BLOOD PRESSURE: 65 MMHG | TEMPERATURE: 98 F | HEART RATE: 78 BPM | SYSTOLIC BLOOD PRESSURE: 101 MMHG | RESPIRATION RATE: 18 BRPM

## 2022-01-01 VITALS
RESPIRATION RATE: 14 BRPM | HEART RATE: 102 BPM | BODY MASS INDEX: 24.29 KG/M2 | WEIGHT: 164 LBS | HEIGHT: 69 IN | TEMPERATURE: 97.9 F | SYSTOLIC BLOOD PRESSURE: 130 MMHG | DIASTOLIC BLOOD PRESSURE: 77 MMHG

## 2022-01-01 DIAGNOSIS — Z87.891 PERSONAL HISTORY OF NICOTINE DEPENDENCE: ICD-10-CM

## 2022-01-01 DIAGNOSIS — C34.90 MALIGNANT NEOPLASM OF UNSPECIFIED PART OF UNSPECIFIED BRONCHUS OR LUNG: ICD-10-CM

## 2022-01-01 DIAGNOSIS — E78.5 HYPERLIPIDEMIA, UNSPECIFIED: ICD-10-CM

## 2022-01-01 DIAGNOSIS — I31.3 PERICARDIAL EFFUSION (NONINFLAMMATORY): ICD-10-CM

## 2022-01-01 DIAGNOSIS — Z79.899 OTHER LONG TERM (CURRENT) DRUG THERAPY: ICD-10-CM

## 2022-01-01 DIAGNOSIS — Z95.820 PERIPHERAL VASCULAR ANGIOPLASTY STATUS WITH IMPLANTS AND GRAFTS: ICD-10-CM

## 2022-01-01 DIAGNOSIS — D50.9 IRON DEFICIENCY ANEMIA, UNSPECIFIED: ICD-10-CM

## 2022-01-01 DIAGNOSIS — R77.8 OTHER SPECIFIED ABNORMALITIES OF PLASMA PROTEINS: ICD-10-CM

## 2022-01-01 DIAGNOSIS — E03.8 OTHER SPECIFIED HYPOTHYROIDISM: ICD-10-CM

## 2022-01-01 DIAGNOSIS — Z79.891 LONG TERM (CURRENT) USE OF OPIATE ANALGESIC: ICD-10-CM

## 2022-01-01 DIAGNOSIS — Z51.12 ENCOUNTER FOR ANTINEOPLASTIC IMMUNOTHERAPY: ICD-10-CM

## 2022-01-01 DIAGNOSIS — J44.1 CHRONIC OBSTRUCTIVE PULMONARY DISEASE WITH (ACUTE) EXACERBATION: ICD-10-CM

## 2022-01-01 DIAGNOSIS — Z00.00 ENCOUNTER FOR GENERAL ADULT MEDICAL EXAMINATION W/OUT ABNORMAL FINDINGS: ICD-10-CM

## 2022-01-01 DIAGNOSIS — R00.0 TACHYCARDIA, UNSPECIFIED: ICD-10-CM

## 2022-01-01 DIAGNOSIS — J90 PLEURAL EFFUSION, NOT ELSEWHERE CLASSIFIED: ICD-10-CM

## 2022-01-01 DIAGNOSIS — F41.9 ANXIETY DISORDER, UNSPECIFIED: ICD-10-CM

## 2022-01-01 DIAGNOSIS — Z79.84 LONG TERM (CURRENT) USE OF ORAL HYPOGLYCEMIC DRUGS: ICD-10-CM

## 2022-01-01 DIAGNOSIS — C34.12 MALIGNANT NEOPLASM OF UPPER LOBE, LEFT BRONCHUS OR LUNG: ICD-10-CM

## 2022-01-01 DIAGNOSIS — Z95.5 PRESENCE OF CORONARY ANGIOPLASTY IMPLANT AND GRAFT: Chronic | ICD-10-CM

## 2022-01-01 DIAGNOSIS — I31.39 OTHER PERICARDIAL EFFUSION (NONINFLAMMATORY): ICD-10-CM

## 2022-01-01 DIAGNOSIS — E11.65 TYPE 2 DIABETES MELLITUS WITH HYPERGLYCEMIA: ICD-10-CM

## 2022-01-01 DIAGNOSIS — I70.209 UNSPECIFIED ATHEROSCLEROSIS OF NATIVE ARTERIES OF EXTREMITIES, UNSPECIFIED EXTREMITY: ICD-10-CM

## 2022-01-01 DIAGNOSIS — I50.9 HEART FAILURE, UNSPECIFIED: ICD-10-CM

## 2022-01-01 DIAGNOSIS — C79.89 SECONDARY MALIGNANT NEOPLASM OF OTHER SPECIFIED SITES: ICD-10-CM

## 2022-01-01 DIAGNOSIS — I25.10 ATHEROSCLEROTIC HEART DISEASE OF NATIVE CORONARY ARTERY WITHOUT ANGINA PECTORIS: ICD-10-CM

## 2022-01-01 DIAGNOSIS — E11.51 TYPE 2 DIABETES MELLITUS WITH DIABETIC PERIPHERAL ANGIOPATHY WITHOUT GANGRENE: ICD-10-CM

## 2022-01-01 DIAGNOSIS — I10 ESSENTIAL (PRIMARY) HYPERTENSION: ICD-10-CM

## 2022-01-01 DIAGNOSIS — F17.210 NICOTINE DEPENDENCE, CIGARETTES, UNCOMPLICATED: ICD-10-CM

## 2022-01-01 DIAGNOSIS — Z95.5 PRESENCE OF CORONARY ANGIOPLASTY IMPLANT AND GRAFT: ICD-10-CM

## 2022-01-01 LAB
A1C WITH ESTIMATED AVERAGE GLUCOSE RESULT: 8.4 % — HIGH (ref 4–5.6)
ALBUMIN FLD-MCNC: 3.9 G/DL — SIGNIFICANT CHANGE UP
ALBUMIN SERPL ELPH-MCNC: 3.9 G/DL — SIGNIFICANT CHANGE UP (ref 3.5–5.2)
ALBUMIN SERPL ELPH-MCNC: 4 G/DL — SIGNIFICANT CHANGE UP (ref 3.5–5.2)
ALBUMIN SERPL ELPH-MCNC: 4.1 G/DL
ALBUMIN SERPL ELPH-MCNC: 4.1 G/DL
ALBUMIN SERPL ELPH-MCNC: 4.2 G/DL
ALBUMIN SERPL ELPH-MCNC: 4.2 G/DL
ALBUMIN SERPL ELPH-MCNC: 4.2 G/DL — SIGNIFICANT CHANGE UP (ref 3.5–5.2)
ALBUMIN SERPL ELPH-MCNC: 4.2 G/DL — SIGNIFICANT CHANGE UP (ref 3.5–5.2)
ALBUMIN SERPL ELPH-MCNC: 4.3 G/DL
ALBUMIN SERPL ELPH-MCNC: 4.4 G/DL — SIGNIFICANT CHANGE UP (ref 3.5–5.2)
ALBUMIN SERPL ELPH-MCNC: 4.4 G/DL — SIGNIFICANT CHANGE UP (ref 3.5–5.2)
ALBUMIN SERPL ELPH-MCNC: 4.5 G/DL — SIGNIFICANT CHANGE UP (ref 3.5–5.2)
ALBUMIN SERPL ELPH-MCNC: 4.6 G/DL
ALBUMIN SERPL ELPH-MCNC: 4.6 G/DL
ALBUMIN SERPL ELPH-MCNC: 4.6 G/DL — SIGNIFICANT CHANGE UP (ref 3.5–5.2)
ALP BLD-CCNC: 101 U/L
ALP BLD-CCNC: 113 U/L
ALP BLD-CCNC: 85 U/L
ALP BLD-CCNC: 88 U/L
ALP BLD-CCNC: 90 U/L
ALP BLD-CCNC: 95 U/L
ALP BLD-CCNC: 96 U/L
ALP SERPL-CCNC: 112 U/L — SIGNIFICANT CHANGE UP (ref 30–115)
ALP SERPL-CCNC: 69 U/L — SIGNIFICANT CHANGE UP (ref 30–115)
ALP SERPL-CCNC: 84 U/L — SIGNIFICANT CHANGE UP (ref 30–115)
ALP SERPL-CCNC: 86 U/L — SIGNIFICANT CHANGE UP (ref 30–115)
ALP SERPL-CCNC: 88 U/L — SIGNIFICANT CHANGE UP (ref 30–115)
ALP SERPL-CCNC: 92 U/L — SIGNIFICANT CHANGE UP (ref 30–115)
ALP SERPL-CCNC: 96 U/L — SIGNIFICANT CHANGE UP (ref 30–115)
ALP SERPL-CCNC: 98 U/L — SIGNIFICANT CHANGE UP (ref 30–115)
ALT FLD-CCNC: 32 U/L — SIGNIFICANT CHANGE UP (ref 0–41)
ALT FLD-CCNC: 38 U/L — SIGNIFICANT CHANGE UP (ref 0–41)
ALT FLD-CCNC: 45 U/L — HIGH (ref 0–41)
ALT FLD-CCNC: 59 U/L — HIGH (ref 0–41)
ALT FLD-CCNC: 60 U/L — HIGH (ref 0–41)
ALT FLD-CCNC: 65 U/L — HIGH (ref 0–41)
ALT FLD-CCNC: 77 U/L — HIGH (ref 0–41)
ALT FLD-CCNC: 85 U/L — HIGH (ref 0–41)
ALT SERPL-CCNC: 13 U/L
ALT SERPL-CCNC: 16 U/L
ALT SERPL-CCNC: 21 U/L
ALT SERPL-CCNC: 27 U/L
ALT SERPL-CCNC: 31 U/L
ANION GAP SERPL CALC-SCNC: 10 MMOL/L
ANION GAP SERPL CALC-SCNC: 10 MMOL/L — SIGNIFICANT CHANGE UP (ref 7–14)
ANION GAP SERPL CALC-SCNC: 11 MMOL/L — SIGNIFICANT CHANGE UP (ref 7–14)
ANION GAP SERPL CALC-SCNC: 11 MMOL/L — SIGNIFICANT CHANGE UP (ref 7–14)
ANION GAP SERPL CALC-SCNC: 12 MMOL/L
ANION GAP SERPL CALC-SCNC: 12 MMOL/L — SIGNIFICANT CHANGE UP (ref 7–14)
ANION GAP SERPL CALC-SCNC: 12 MMOL/L — SIGNIFICANT CHANGE UP (ref 7–14)
ANION GAP SERPL CALC-SCNC: 13 MMOL/L
ANION GAP SERPL CALC-SCNC: 13 MMOL/L
ANION GAP SERPL CALC-SCNC: 13 MMOL/L — SIGNIFICANT CHANGE UP (ref 7–14)
ANION GAP SERPL CALC-SCNC: 16 MMOL/L
ANION GAP SERPL CALC-SCNC: 17 MMOL/L
ANION GAP SERPL CALC-SCNC: 8 MMOL/L — SIGNIFICANT CHANGE UP (ref 7–14)
ANION GAP SERPL CALC-SCNC: 9 MMOL/L
ANION GAP SERPL CALC-SCNC: 9 MMOL/L — SIGNIFICANT CHANGE UP (ref 7–14)
ANISOCYTOSIS BLD QL: SIGNIFICANT CHANGE UP
APPEARANCE UR: CLEAR — SIGNIFICANT CHANGE UP
APTT BLD: 29 SEC — SIGNIFICANT CHANGE UP (ref 27–39.2)
APTT BLD: 29.7 SEC — SIGNIFICANT CHANGE UP (ref 27–39.2)
APTT BLD: 32.8 SEC — SIGNIFICANT CHANGE UP (ref 27–39.2)
AST SERPL-CCNC: 21 U/L
AST SERPL-CCNC: 22 U/L — SIGNIFICANT CHANGE UP (ref 0–41)
AST SERPL-CCNC: 23 U/L
AST SERPL-CCNC: 26 U/L
AST SERPL-CCNC: 26 U/L
AST SERPL-CCNC: 27 U/L
AST SERPL-CCNC: 29 U/L — SIGNIFICANT CHANGE UP (ref 0–41)
AST SERPL-CCNC: 32 U/L
AST SERPL-CCNC: 32 U/L — SIGNIFICANT CHANGE UP (ref 0–41)
AST SERPL-CCNC: 34 U/L — SIGNIFICANT CHANGE UP (ref 0–41)
AST SERPL-CCNC: 36 U/L — SIGNIFICANT CHANGE UP (ref 0–41)
AST SERPL-CCNC: 43 U/L — HIGH (ref 0–41)
AST SERPL-CCNC: 45 U/L — HIGH (ref 0–41)
AST SERPL-CCNC: 47 U/L — HIGH (ref 0–41)
AST SERPL-CCNC: 50 U/L
B PERT IGG+IGM PNL SER: CLEAR — SIGNIFICANT CHANGE UP
BASOPHILS # BLD AUTO: 0.09 K/UL
BASOPHILS # BLD AUTO: 0.1 K/UL
BASOPHILS # BLD AUTO: 0.1 K/UL — SIGNIFICANT CHANGE UP (ref 0–0.2)
BASOPHILS # BLD AUTO: 0.11 K/UL
BASOPHILS # BLD AUTO: 0.11 K/UL
BASOPHILS # BLD AUTO: 0.11 K/UL — SIGNIFICANT CHANGE UP (ref 0–0.2)
BASOPHILS # BLD AUTO: 0.11 K/UL — SIGNIFICANT CHANGE UP (ref 0–0.2)
BASOPHILS # BLD AUTO: 0.12 K/UL — SIGNIFICANT CHANGE UP (ref 0–0.2)
BASOPHILS # BLD AUTO: 0.13 K/UL
BASOPHILS # BLD AUTO: 0.13 K/UL
BASOPHILS # BLD AUTO: 0.13 K/UL — SIGNIFICANT CHANGE UP (ref 0–0.2)
BASOPHILS # BLD AUTO: 0.13 K/UL — SIGNIFICANT CHANGE UP (ref 0–0.2)
BASOPHILS NFR BLD AUTO: 0.6 % — SIGNIFICANT CHANGE UP (ref 0–1)
BASOPHILS NFR BLD AUTO: 0.7 % — SIGNIFICANT CHANGE UP (ref 0–1)
BASOPHILS NFR BLD AUTO: 0.9 % — SIGNIFICANT CHANGE UP (ref 0–1)
BASOPHILS NFR BLD AUTO: 0.9 % — SIGNIFICANT CHANGE UP (ref 0–1)
BASOPHILS NFR BLD AUTO: 1 %
BASOPHILS NFR BLD AUTO: 1 % — SIGNIFICANT CHANGE UP (ref 0–1)
BASOPHILS NFR BLD AUTO: 1 % — SIGNIFICANT CHANGE UP (ref 0–1)
BASOPHILS NFR BLD AUTO: 1.1 %
BASOPHILS NFR BLD AUTO: 1.2 %
BASOPHILS NFR BLD AUTO: 1.3 %
BASOPHILS NFR BLD AUTO: 1.4 %
BASOPHILS NFR BLD AUTO: 1.7 %
BILIRUB SERPL-MCNC: 0.6 MG/DL
BILIRUB SERPL-MCNC: 0.6 MG/DL — SIGNIFICANT CHANGE UP (ref 0.2–1.2)
BILIRUB SERPL-MCNC: 0.7 MG/DL
BILIRUB SERPL-MCNC: 0.8 MG/DL
BILIRUB SERPL-MCNC: 0.8 MG/DL — SIGNIFICANT CHANGE UP (ref 0.2–1.2)
BILIRUB SERPL-MCNC: 0.8 MG/DL — SIGNIFICANT CHANGE UP (ref 0.2–1.2)
BILIRUB SERPL-MCNC: 0.9 MG/DL
BILIRUB SERPL-MCNC: 0.9 MG/DL — SIGNIFICANT CHANGE UP (ref 0.2–1.2)
BILIRUB SERPL-MCNC: 0.9 MG/DL — SIGNIFICANT CHANGE UP (ref 0.2–1.2)
BILIRUB SERPL-MCNC: 1 MG/DL — SIGNIFICANT CHANGE UP (ref 0.2–1.2)
BILIRUB SERPL-MCNC: 1.1 MG/DL — SIGNIFICANT CHANGE UP (ref 0.2–1.2)
BILIRUB SERPL-MCNC: 1.3 MG/DL — HIGH (ref 0.2–1.2)
BILIRUB UR-MCNC: NEGATIVE — SIGNIFICANT CHANGE UP
BLD GP AB SCN SERPL QL: SIGNIFICANT CHANGE UP
BUN SERPL-MCNC: 11 MG/DL
BUN SERPL-MCNC: 11 MG/DL — SIGNIFICANT CHANGE UP (ref 10–20)
BUN SERPL-MCNC: 12 MG/DL — SIGNIFICANT CHANGE UP (ref 10–20)
BUN SERPL-MCNC: 13 MG/DL
BUN SERPL-MCNC: 14 MG/DL
BUN SERPL-MCNC: 14 MG/DL — SIGNIFICANT CHANGE UP (ref 10–20)
BUN SERPL-MCNC: 15 MG/DL
BUN SERPL-MCNC: 16 MG/DL
BUN SERPL-MCNC: 17 MG/DL — SIGNIFICANT CHANGE UP (ref 10–20)
BUN SERPL-MCNC: 21 MG/DL — HIGH (ref 10–20)
BUN SERPL-MCNC: 23 MG/DL — HIGH (ref 10–20)
BUN SERPL-MCNC: 26 MG/DL — HIGH (ref 10–20)
BUN SERPL-MCNC: 26 MG/DL — HIGH (ref 10–20)
CALCIUM SERPL-MCNC: 10 MG/DL
CALCIUM SERPL-MCNC: 10 MG/DL
CALCIUM SERPL-MCNC: 10 MG/DL — SIGNIFICANT CHANGE UP (ref 8.5–10.1)
CALCIUM SERPL-MCNC: 9.2 MG/DL — SIGNIFICANT CHANGE UP (ref 8.5–10.1)
CALCIUM SERPL-MCNC: 9.3 MG/DL
CALCIUM SERPL-MCNC: 9.4 MG/DL
CALCIUM SERPL-MCNC: 9.4 MG/DL — SIGNIFICANT CHANGE UP (ref 8.5–10.1)
CALCIUM SERPL-MCNC: 9.6 MG/DL
CALCIUM SERPL-MCNC: 9.7 MG/DL — SIGNIFICANT CHANGE UP (ref 8.5–10.1)
CALCIUM SERPL-MCNC: 9.7 MG/DL — SIGNIFICANT CHANGE UP (ref 8.5–10.1)
CALCIUM SERPL-MCNC: 9.8 MG/DL
CALCIUM SERPL-MCNC: 9.8 MG/DL
CALCIUM SERPL-MCNC: 9.8 MG/DL — SIGNIFICANT CHANGE UP (ref 8.5–10.1)
CALCIUM SERPL-MCNC: 9.8 MG/DL — SIGNIFICANT CHANGE UP (ref 8.5–10.1)
CALCIUM SERPL-MCNC: 9.9 MG/DL — SIGNIFICANT CHANGE UP (ref 8.5–10.1)
CHLORIDE SERPL-SCNC: 102 MMOL/L
CHLORIDE SERPL-SCNC: 88 MMOL/L — LOW (ref 98–110)
CHLORIDE SERPL-SCNC: 90 MMOL/L — LOW (ref 98–110)
CHLORIDE SERPL-SCNC: 91 MMOL/L — LOW (ref 98–110)
CHLORIDE SERPL-SCNC: 94 MMOL/L
CHLORIDE SERPL-SCNC: 95 MMOL/L
CHLORIDE SERPL-SCNC: 96 MMOL/L
CHLORIDE SERPL-SCNC: 97 MMOL/L
CHLORIDE SERPL-SCNC: 97 MMOL/L
CHLORIDE SERPL-SCNC: 99 MMOL/L
CHOLEST SERPL-MCNC: 173 MG/DL — SIGNIFICANT CHANGE UP
CO2 SERPL-SCNC: 25 MMOL/L
CO2 SERPL-SCNC: 26 MMOL/L
CO2 SERPL-SCNC: 27 MMOL/L
CO2 SERPL-SCNC: 27 MMOL/L
CO2 SERPL-SCNC: 28 MMOL/L
CO2 SERPL-SCNC: 29 MMOL/L
CO2 SERPL-SCNC: 29 MMOL/L
CO2 SERPL-SCNC: 30 MMOL/L — SIGNIFICANT CHANGE UP (ref 17–32)
CO2 SERPL-SCNC: 31 MMOL/L — SIGNIFICANT CHANGE UP (ref 17–32)
CO2 SERPL-SCNC: 32 MMOL/L — SIGNIFICANT CHANGE UP (ref 17–32)
CO2 SERPL-SCNC: 34 MMOL/L — HIGH (ref 17–32)
CO2 SERPL-SCNC: 36 MMOL/L — HIGH (ref 17–32)
CO2 SERPL-SCNC: 37 MMOL/L — HIGH (ref 17–32)
COLOR FLD: YELLOW — SIGNIFICANT CHANGE UP
COLOR SPEC: COLORLESS — SIGNIFICANT CHANGE UP
CREAT SERPL-MCNC: 0.6 MG/DL
CREAT SERPL-MCNC: 0.6 MG/DL
CREAT SERPL-MCNC: 0.7 MG/DL
CREAT SERPL-MCNC: 0.7 MG/DL — SIGNIFICANT CHANGE UP (ref 0.7–1.5)
CREAT SERPL-MCNC: 0.7 MG/DL — SIGNIFICANT CHANGE UP (ref 0.7–1.5)
CREAT SERPL-MCNC: 0.8 MG/DL — SIGNIFICANT CHANGE UP (ref 0.7–1.5)
CRP SERPL-MCNC: 36.8 MG/L — HIGH
DACRYOCYTES BLD QL SMEAR: SLIGHT — SIGNIFICANT CHANGE UP
DIFF PNL FLD: NEGATIVE — SIGNIFICANT CHANGE UP
EGFR: 100 ML/MIN/1.73M2
EGFR: 100 ML/MIN/1.73M2 — SIGNIFICANT CHANGE UP
EGFR: 100 ML/MIN/1.73M2 — SIGNIFICANT CHANGE UP
EGFR: 105 ML/MIN/1.73M2
EGFR: 105 ML/MIN/1.73M2
EGFR: 96 ML/MIN/1.73M2 — SIGNIFICANT CHANGE UP
EOSINOPHIL # BLD AUTO: 0.09 K/UL — SIGNIFICANT CHANGE UP (ref 0–0.7)
EOSINOPHIL # BLD AUTO: 0.1 K/UL
EOSINOPHIL # BLD AUTO: 0.12 K/UL
EOSINOPHIL # BLD AUTO: 0.13 K/UL
EOSINOPHIL # BLD AUTO: 0.13 K/UL — SIGNIFICANT CHANGE UP (ref 0–0.7)
EOSINOPHIL # BLD AUTO: 0.13 K/UL — SIGNIFICANT CHANGE UP (ref 0–0.7)
EOSINOPHIL # BLD AUTO: 0.14 K/UL
EOSINOPHIL # BLD AUTO: 0.15 K/UL — SIGNIFICANT CHANGE UP (ref 0–0.7)
EOSINOPHIL # BLD AUTO: 0.18 K/UL
EOSINOPHIL # BLD AUTO: 0.21 K/UL
EOSINOPHIL # BLD AUTO: 0.21 K/UL — SIGNIFICANT CHANGE UP (ref 0–0.7)
EOSINOPHIL # BLD AUTO: 0.21 K/UL — SIGNIFICANT CHANGE UP (ref 0–0.7)
EOSINOPHIL NFR BLD AUTO: 0.7 % — SIGNIFICANT CHANGE UP (ref 0–8)
EOSINOPHIL NFR BLD AUTO: 0.9 % — SIGNIFICANT CHANGE UP (ref 0–8)
EOSINOPHIL NFR BLD AUTO: 1.2 %
EOSINOPHIL NFR BLD AUTO: 1.3 %
EOSINOPHIL NFR BLD AUTO: 1.3 %
EOSINOPHIL NFR BLD AUTO: 1.7 %
EOSINOPHIL NFR BLD AUTO: 1.7 % — SIGNIFICANT CHANGE UP (ref 0–8)
EOSINOPHIL NFR BLD AUTO: 1.9 %
EOSINOPHIL NFR BLD AUTO: 2 % — SIGNIFICANT CHANGE UP (ref 0–8)
EOSINOPHIL NFR BLD AUTO: 2.7 %
ERYTHROCYTE [SEDIMENTATION RATE] IN BLOOD: 18 MM/HR — HIGH (ref 0–10)
ESTIMATED AVERAGE GLUCOSE: 194 MG/DL — HIGH (ref 68–114)
FERRITIN SERPL-MCNC: 1298 NG/ML — HIGH (ref 30–400)
G6PD SER-CCNC: 27 U/G HGB
GAS PNL BLDA: SIGNIFICANT CHANGE UP
GLUCOSE BLDC GLUCOMTR-MCNC: 139 MG/DL — HIGH (ref 70–99)
GLUCOSE BLDC GLUCOMTR-MCNC: 140 MG/DL — HIGH (ref 70–99)
GLUCOSE BLDC GLUCOMTR-MCNC: 157 MG/DL — HIGH (ref 70–99)
GLUCOSE BLDC GLUCOMTR-MCNC: 160 MG/DL — HIGH (ref 70–99)
GLUCOSE BLDC GLUCOMTR-MCNC: 167 MG/DL — HIGH (ref 70–99)
GLUCOSE BLDC GLUCOMTR-MCNC: 169 MG/DL — HIGH (ref 70–99)
GLUCOSE BLDC GLUCOMTR-MCNC: 170 MG/DL — HIGH (ref 70–99)
GLUCOSE BLDC GLUCOMTR-MCNC: 171 MG/DL — HIGH (ref 70–99)
GLUCOSE BLDC GLUCOMTR-MCNC: 187 MG/DL — HIGH (ref 70–99)
GLUCOSE BLDC GLUCOMTR-MCNC: 190 MG/DL — HIGH (ref 70–99)
GLUCOSE BLDC GLUCOMTR-MCNC: 196 MG/DL — HIGH (ref 70–99)
GLUCOSE BLDC GLUCOMTR-MCNC: 204 MG/DL — HIGH (ref 70–99)
GLUCOSE BLDC GLUCOMTR-MCNC: 205 MG/DL — HIGH (ref 70–99)
GLUCOSE BLDC GLUCOMTR-MCNC: 206 MG/DL — HIGH (ref 70–99)
GLUCOSE BLDC GLUCOMTR-MCNC: 214 MG/DL — HIGH (ref 70–99)
GLUCOSE BLDC GLUCOMTR-MCNC: 239 MG/DL — HIGH (ref 70–99)
GLUCOSE BLDC GLUCOMTR-MCNC: 240 MG/DL — HIGH (ref 70–99)
GLUCOSE BLDC GLUCOMTR-MCNC: 243 MG/DL — HIGH (ref 70–99)
GLUCOSE BLDC GLUCOMTR-MCNC: 258 MG/DL — HIGH (ref 70–99)
GLUCOSE BLDC GLUCOMTR-MCNC: 260 MG/DL — HIGH (ref 70–99)
GLUCOSE BLDC GLUCOMTR-MCNC: 263 MG/DL — HIGH (ref 70–99)
GLUCOSE BLDC GLUCOMTR-MCNC: 271 MG/DL — HIGH (ref 70–99)
GLUCOSE BLDC GLUCOMTR-MCNC: 277 MG/DL — HIGH (ref 70–99)
GLUCOSE BLDC GLUCOMTR-MCNC: 278 MG/DL — HIGH (ref 70–99)
GLUCOSE BLDC GLUCOMTR-MCNC: 283 MG/DL — HIGH (ref 70–99)
GLUCOSE BLDC GLUCOMTR-MCNC: 296 MG/DL — HIGH (ref 70–99)
GLUCOSE BLDC GLUCOMTR-MCNC: 301 MG/DL — HIGH (ref 70–99)
GLUCOSE BLDC GLUCOMTR-MCNC: 302 MG/DL — HIGH (ref 70–99)
GLUCOSE BLDC GLUCOMTR-MCNC: 316 MG/DL — HIGH (ref 70–99)
GLUCOSE BLDC GLUCOMTR-MCNC: 317 MG/DL — HIGH (ref 70–99)
GLUCOSE BLDC GLUCOMTR-MCNC: 329 MG/DL — HIGH (ref 70–99)
GLUCOSE BLDC GLUCOMTR-MCNC: 342 MG/DL — HIGH (ref 70–99)
GLUCOSE BLDC GLUCOMTR-MCNC: 386 MG/DL — HIGH (ref 70–99)
GLUCOSE BLDC GLUCOMTR-MCNC: 405 MG/DL — HIGH (ref 70–99)
GLUCOSE FLD-MCNC: 184 MG/DL — SIGNIFICANT CHANGE UP
GLUCOSE SERPL-MCNC: 131 MG/DL
GLUCOSE SERPL-MCNC: 135 MG/DL — HIGH (ref 70–99)
GLUCOSE SERPL-MCNC: 141 MG/DL — HIGH (ref 70–99)
GLUCOSE SERPL-MCNC: 145 MG/DL — HIGH (ref 70–99)
GLUCOSE SERPL-MCNC: 147 MG/DL — HIGH (ref 70–99)
GLUCOSE SERPL-MCNC: 148 MG/DL
GLUCOSE SERPL-MCNC: 194 MG/DL — HIGH (ref 70–99)
GLUCOSE SERPL-MCNC: 195 MG/DL
GLUCOSE SERPL-MCNC: 199 MG/DL
GLUCOSE SERPL-MCNC: 211 MG/DL — HIGH (ref 70–99)
GLUCOSE SERPL-MCNC: 218 MG/DL
GLUCOSE SERPL-MCNC: 218 MG/DL — HIGH (ref 70–99)
GLUCOSE SERPL-MCNC: 220 MG/DL
GLUCOSE SERPL-MCNC: 277 MG/DL
GLUCOSE SERPL-MCNC: 302 MG/DL — HIGH (ref 70–99)
GLUCOSE UR QL: NEGATIVE — SIGNIFICANT CHANGE UP
HCT VFR BLD CALC: 33 %
HCT VFR BLD CALC: 33.6 %
HCT VFR BLD CALC: 36.9 %
HCT VFR BLD CALC: 37.2 %
HCT VFR BLD CALC: 37.3 %
HCT VFR BLD CALC: 39.8 %
HCT VFR BLD CALC: 40.8 %
HCT VFR BLD CALC: 41.7 % — LOW (ref 42–52)
HCT VFR BLD CALC: 43.1 % — SIGNIFICANT CHANGE UP (ref 42–52)
HCT VFR BLD CALC: 43.4 % — SIGNIFICANT CHANGE UP (ref 42–52)
HCT VFR BLD CALC: 44.2 % — SIGNIFICANT CHANGE UP (ref 42–52)
HCT VFR BLD CALC: 44.2 % — SIGNIFICANT CHANGE UP (ref 42–52)
HCT VFR BLD CALC: 45.1 % — SIGNIFICANT CHANGE UP (ref 42–52)
HCT VFR BLD CALC: 46.3 % — SIGNIFICANT CHANGE UP (ref 42–52)
HCT VFR BLD CALC: 48.7 % — SIGNIFICANT CHANGE UP (ref 42–52)
HDLC SERPL-MCNC: 33 MG/DL — LOW
HGB BLD-MCNC: 10.6 G/DL
HGB BLD-MCNC: 10.9 G/DL
HGB BLD-MCNC: 11.8 G/DL
HGB BLD-MCNC: 11.9 G/DL
HGB BLD-MCNC: 12 G/DL
HGB BLD-MCNC: 12.7 G/DL
HGB BLD-MCNC: 13 G/DL
HGB BLD-MCNC: 13 G/DL — LOW (ref 14–18)
HGB BLD-MCNC: 13.1 G/DL — LOW (ref 14–18)
HGB BLD-MCNC: 13.3 G/DL — LOW (ref 14–18)
HGB BLD-MCNC: 13.8 G/DL — LOW (ref 14–18)
HGB BLD-MCNC: 13.8 G/DL — LOW (ref 14–18)
HGB BLD-MCNC: 14.1 G/DL — SIGNIFICANT CHANGE UP (ref 14–18)
HGB BLD-MCNC: 14.6 G/DL — SIGNIFICANT CHANGE UP (ref 14–18)
HGB BLD-MCNC: 14.9 G/DL — SIGNIFICANT CHANGE UP (ref 14–18)
HYPOCHROMIA BLD QL: SIGNIFICANT CHANGE UP
IMM GRANULOCYTES NFR BLD AUTO: 0.2 %
IMM GRANULOCYTES NFR BLD AUTO: 0.2 %
IMM GRANULOCYTES NFR BLD AUTO: 0.3 %
IMM GRANULOCYTES NFR BLD AUTO: 0.3 %
IMM GRANULOCYTES NFR BLD AUTO: 0.3 % — SIGNIFICANT CHANGE UP (ref 0.1–0.3)
IMM GRANULOCYTES NFR BLD AUTO: 0.4 %
IMM GRANULOCYTES NFR BLD AUTO: 0.4 % — HIGH (ref 0.1–0.3)
IMM GRANULOCYTES NFR BLD AUTO: 0.6 % — HIGH (ref 0.1–0.3)
IMM GRANULOCYTES NFR BLD AUTO: 0.9 %
INR BLD: 1.12 RATIO — SIGNIFICANT CHANGE UP (ref 0.65–1.3)
INR BLD: 1.17 RATIO — SIGNIFICANT CHANGE UP (ref 0.65–1.3)
INR BLD: 1.19 RATIO — SIGNIFICANT CHANGE UP (ref 0.65–1.3)
IRON SATN MFR SERPL: 15 % — SIGNIFICANT CHANGE UP (ref 15–50)
IRON SATN MFR SERPL: 49 UG/DL — SIGNIFICANT CHANGE UP (ref 35–150)
KETONES UR-MCNC: NEGATIVE — SIGNIFICANT CHANGE UP
LDH SERPL L TO P-CCNC: 304 U/L — SIGNIFICANT CHANGE UP
LEUKOCYTE ESTERASE UR-ACNC: NEGATIVE — SIGNIFICANT CHANGE UP
LIPID PNL WITH DIRECT LDL SERPL: 117 MG/DL — HIGH
LYMPHOCYTES # BLD AUTO: 0.63 K/UL — LOW (ref 1.2–3.4)
LYMPHOCYTES # BLD AUTO: 1.12 K/UL
LYMPHOCYTES # BLD AUTO: 1.15 K/UL — LOW (ref 1.2–3.4)
LYMPHOCYTES # BLD AUTO: 1.18 K/UL
LYMPHOCYTES # BLD AUTO: 1.24 K/UL
LYMPHOCYTES # BLD AUTO: 1.39 K/UL
LYMPHOCYTES # BLD AUTO: 1.4 K/UL
LYMPHOCYTES # BLD AUTO: 1.4 K/UL — SIGNIFICANT CHANGE UP (ref 1.2–3.4)
LYMPHOCYTES # BLD AUTO: 1.97 K/UL
LYMPHOCYTES # BLD AUTO: 10.9 % — LOW (ref 20.5–51.1)
LYMPHOCYTES # BLD AUTO: 11.4 % — LOW (ref 20.5–51.1)
LYMPHOCYTES # BLD AUTO: 15.5 % — LOW (ref 20.5–51.1)
LYMPHOCYTES # BLD AUTO: 16.2 % — LOW (ref 20.5–51.1)
LYMPHOCYTES # BLD AUTO: 18.8 % — LOW (ref 20.5–51.1)
LYMPHOCYTES # BLD AUTO: 2.28 K/UL — SIGNIFICANT CHANGE UP (ref 1.2–3.4)
LYMPHOCYTES # BLD AUTO: 2.93 K/UL — SIGNIFICANT CHANGE UP (ref 1.2–3.4)
LYMPHOCYTES # BLD AUTO: 3.28 K/UL — SIGNIFICANT CHANGE UP (ref 1.2–3.4)
LYMPHOCYTES # BLD AUTO: 6.2 % — LOW (ref 20.5–51.1)
LYMPHOCYTES # FLD: SIGNIFICANT CHANGE UP
LYMPHOCYTES NFR BLD AUTO: 11.4 %
LYMPHOCYTES NFR BLD AUTO: 12.6 %
LYMPHOCYTES NFR BLD AUTO: 15.7 %
LYMPHOCYTES NFR BLD AUTO: 17 %
LYMPHOCYTES NFR BLD AUTO: 17.9 %
LYMPHOCYTES NFR BLD AUTO: 20 %
MACROCYTES BLD QL: SLIGHT — SIGNIFICANT CHANGE UP
MAGNESIUM SERPL-MCNC: 2 MG/DL — SIGNIFICANT CHANGE UP (ref 1.8–2.4)
MAGNESIUM SERPL-MCNC: 2.2 MG/DL — SIGNIFICANT CHANGE UP (ref 1.8–2.4)
MAN DIFF?: NORMAL
MANUAL SMEAR VERIFICATION: SIGNIFICANT CHANGE UP
MCHC RBC-ENTMCNC: 19.3 PG — LOW (ref 27–31)
MCHC RBC-ENTMCNC: 19.3 PG — LOW (ref 27–31)
MCHC RBC-ENTMCNC: 19.4 PG — LOW (ref 27–31)
MCHC RBC-ENTMCNC: 19.5 PG
MCHC RBC-ENTMCNC: 19.5 PG — LOW (ref 27–31)
MCHC RBC-ENTMCNC: 19.5 PG — LOW (ref 27–31)
MCHC RBC-ENTMCNC: 19.6 PG — LOW (ref 27–31)
MCHC RBC-ENTMCNC: 19.6 PG — LOW (ref 27–31)
MCHC RBC-ENTMCNC: 19.7 PG
MCHC RBC-ENTMCNC: 19.8 PG
MCHC RBC-ENTMCNC: 19.8 PG — LOW (ref 27–31)
MCHC RBC-ENTMCNC: 20 PG
MCHC RBC-ENTMCNC: 20.1 PG
MCHC RBC-ENTMCNC: 20.3 PG
MCHC RBC-ENTMCNC: 20.3 PG
MCHC RBC-ENTMCNC: 30.4 G/DL — LOW (ref 32–37)
MCHC RBC-ENTMCNC: 30.6 G/DL — LOW (ref 32–37)
MCHC RBC-ENTMCNC: 30.6 G/DL — LOW (ref 32–37)
MCHC RBC-ENTMCNC: 31.2 G/DL — LOW (ref 32–37)
MCHC RBC-ENTMCNC: 31.3 G/DL — LOW (ref 32–37)
MCHC RBC-ENTMCNC: 31.5 G/DL — LOW (ref 32–37)
MCHC RBC-ENTMCNC: 31.7 G/DL
MCHC RBC-ENTMCNC: 31.9 G/DL
MCHC RBC-ENTMCNC: 31.9 G/DL
MCHC RBC-ENTMCNC: 32.1 G/DL
MCHC RBC-ENTMCNC: 32.2 G/DL
MCHC RBC-ENTMCNC: 32.2 G/DL
MCHC RBC-ENTMCNC: 32.4 G/DL
MCV RBC AUTO: 60.6 FL
MCV RBC AUTO: 61 FL
MCV RBC AUTO: 61.9 FL — LOW (ref 80–94)
MCV RBC AUTO: 62.3 FL
MCV RBC AUTO: 62.5 FL
MCV RBC AUTO: 62.5 FL — LOW (ref 80–94)
MCV RBC AUTO: 62.8 FL
MCV RBC AUTO: 62.8 FL — LOW (ref 80–94)
MCV RBC AUTO: 62.8 FL — LOW (ref 80–94)
MCV RBC AUTO: 63 FL — LOW (ref 80–94)
MCV RBC AUTO: 63.2 FL
MCV RBC AUTO: 63.3 FL
MCV RBC AUTO: 63.4 FL — LOW (ref 80–94)
MCV RBC AUTO: 63.5 FL — LOW (ref 80–94)
MCV RBC AUTO: 63.6 FL — LOW (ref 80–94)
MESOTHL CELL # FLD: SIGNIFICANT CHANGE UP %
MICROCYTES BLD QL: SLIGHT — SIGNIFICANT CHANGE UP
MONOCYTES # BLD AUTO: 0.54 K/UL — SIGNIFICANT CHANGE UP (ref 0.1–0.6)
MONOCYTES # BLD AUTO: 0.82 K/UL
MONOCYTES # BLD AUTO: 0.98 K/UL
MONOCYTES # BLD AUTO: 0.98 K/UL
MONOCYTES # BLD AUTO: 1.11 K/UL — HIGH (ref 0.1–0.6)
MONOCYTES # BLD AUTO: 1.19 K/UL
MONOCYTES # BLD AUTO: 1.22 K/UL
MONOCYTES # BLD AUTO: 1.24 K/UL
MONOCYTES # BLD AUTO: 1.51 K/UL — HIGH (ref 0.1–0.6)
MONOCYTES # BLD AUTO: 1.62 K/UL — HIGH (ref 0.1–0.6)
MONOCYTES # BLD AUTO: 2.29 K/UL — HIGH (ref 0.1–0.6)
MONOCYTES # BLD AUTO: 2.56 K/UL — HIGH (ref 0.1–0.6)
MONOCYTES NFR BLD AUTO: 10 %
MONOCYTES NFR BLD AUTO: 10.4 %
MONOCYTES NFR BLD AUTO: 10.5 %
MONOCYTES NFR BLD AUTO: 10.6 % — HIGH (ref 1.7–9.3)
MONOCYTES NFR BLD AUTO: 11.5 % — HIGH (ref 1.7–9.3)
MONOCYTES NFR BLD AUTO: 12.3 % — HIGH (ref 1.7–9.3)
MONOCYTES NFR BLD AUTO: 12.6 %
MONOCYTES NFR BLD AUTO: 13.1 % — HIGH (ref 1.7–9.3)
MONOCYTES NFR BLD AUTO: 13.5 % — HIGH (ref 1.7–9.3)
MONOCYTES NFR BLD AUTO: 14.5 %
MONOCYTES NFR BLD AUTO: 15.4 %
MONOCYTES NFR BLD AUTO: 5.3 % — SIGNIFICANT CHANGE UP (ref 1.7–9.3)
MONOS+MACROS # FLD: SIGNIFICANT CHANGE UP %
NEUTROPHILS # BLD AUTO: 11.54 K/UL — HIGH (ref 1.4–6.5)
NEUTROPHILS # BLD AUTO: 13.13 K/UL — HIGH (ref 1.4–6.5)
NEUTROPHILS # BLD AUTO: 5.21 K/UL
NEUTROPHILS # BLD AUTO: 5.24 K/UL
NEUTROPHILS # BLD AUTO: 5.35 K/UL
NEUTROPHILS # BLD AUTO: 6.37 K/UL
NEUTROPHILS # BLD AUTO: 6.88 K/UL
NEUTROPHILS # BLD AUTO: 7.43 K/UL
NEUTROPHILS # BLD AUTO: 7.9 K/UL — HIGH (ref 1.4–6.5)
NEUTROPHILS # BLD AUTO: 8.8 K/UL — HIGH (ref 1.4–6.5)
NEUTROPHILS # BLD AUTO: 8.81 K/UL — HIGH (ref 1.4–6.5)
NEUTROPHILS # BLD AUTO: 9.89 K/UL — HIGH (ref 1.4–6.5)
NEUTROPHILS NFR BLD AUTO: 64.5 %
NEUTROPHILS NFR BLD AUTO: 65.4 %
NEUTROPHILS NFR BLD AUTO: 65.9 %
NEUTROPHILS NFR BLD AUTO: 66.1 % — SIGNIFICANT CHANGE UP (ref 42.2–75.2)
NEUTROPHILS NFR BLD AUTO: 66.9 %
NEUTROPHILS NFR BLD AUTO: 69.3 % — SIGNIFICANT CHANGE UP (ref 42.2–75.2)
NEUTROPHILS NFR BLD AUTO: 70.1 % — SIGNIFICANT CHANGE UP (ref 42.2–75.2)
NEUTROPHILS NFR BLD AUTO: 71.9 % — SIGNIFICANT CHANGE UP (ref 42.2–75.2)
NEUTROPHILS NFR BLD AUTO: 73.2 %
NEUTROPHILS NFR BLD AUTO: 75.2 % — SIGNIFICANT CHANGE UP (ref 42.2–75.2)
NEUTROPHILS NFR BLD AUTO: 76 %
NEUTROPHILS NFR BLD AUTO: 86 % — HIGH (ref 42.2–75.2)
NEUTROPHILS-BODY FLUID: SIGNIFICANT CHANGE UP %
NITRITE UR-MCNC: NEGATIVE — SIGNIFICANT CHANGE UP
NON HDL CHOLESTEROL: 140 MG/DL — HIGH
NON-GYNECOLOGICAL CYTOLOGY STUDY: SIGNIFICANT CHANGE UP
NON-GYNECOLOGICAL CYTOLOGY STUDY: SIGNIFICANT CHANGE UP
NRBC # BLD: 0 /100 WBCS — SIGNIFICANT CHANGE UP (ref 0–0)
NRBC # BLD: 1 /100 — HIGH (ref 0–0)
NRBC # BLD: SIGNIFICANT CHANGE UP /100 WBCS (ref 0–0)
NT-PROBNP SERPL-SCNC: 791 PG/ML — HIGH (ref 0–300)
OVALOCYTES BLD QL SMEAR: SLIGHT — SIGNIFICANT CHANGE UP
PH UR: 7 — SIGNIFICANT CHANGE UP (ref 5–8)
PHOSPHATE SERPL-MCNC: 4.6 MG/DL — SIGNIFICANT CHANGE UP (ref 2.1–4.9)
PLAT MORPH BLD: NORMAL — SIGNIFICANT CHANGE UP
PLATELET # BLD AUTO: 172 K/UL
PLATELET # BLD AUTO: 172 K/UL
PLATELET # BLD AUTO: 183 K/UL
PLATELET # BLD AUTO: 196 K/UL
PLATELET # BLD AUTO: 205 K/UL — SIGNIFICANT CHANGE UP (ref 130–400)
PLATELET # BLD AUTO: 207 K/UL
PLATELET # BLD AUTO: 211 K/UL — SIGNIFICANT CHANGE UP (ref 130–400)
PLATELET # BLD AUTO: 213 K/UL — SIGNIFICANT CHANGE UP (ref 130–400)
PLATELET # BLD AUTO: 240 K/UL — SIGNIFICANT CHANGE UP (ref 130–400)
PLATELET # BLD AUTO: 241 K/UL
PLATELET # BLD AUTO: 245 K/UL — SIGNIFICANT CHANGE UP (ref 130–400)
PLATELET # BLD AUTO: 251 K/UL — SIGNIFICANT CHANGE UP (ref 130–400)
PLATELET # BLD AUTO: 253 K/UL — SIGNIFICANT CHANGE UP (ref 130–400)
PLATELET # BLD AUTO: 260 K/UL
PLATELET # BLD AUTO: 263 K/UL — SIGNIFICANT CHANGE UP (ref 130–400)
PMV BLD: 10.6 FL
POIKILOCYTOSIS BLD QL AUTO: SLIGHT — SIGNIFICANT CHANGE UP
POLYCHROMASIA BLD QL SMEAR: SIGNIFICANT CHANGE UP
POTASSIUM SERPL-MCNC: 4.7 MMOL/L — SIGNIFICANT CHANGE UP (ref 3.5–5)
POTASSIUM SERPL-MCNC: 4.7 MMOL/L — SIGNIFICANT CHANGE UP (ref 3.5–5)
POTASSIUM SERPL-MCNC: 4.8 MMOL/L — SIGNIFICANT CHANGE UP (ref 3.5–5)
POTASSIUM SERPL-MCNC: 4.8 MMOL/L — SIGNIFICANT CHANGE UP (ref 3.5–5)
POTASSIUM SERPL-MCNC: 4.9 MMOL/L — SIGNIFICANT CHANGE UP (ref 3.5–5)
POTASSIUM SERPL-MCNC: 5 MMOL/L — SIGNIFICANT CHANGE UP (ref 3.5–5)
POTASSIUM SERPL-MCNC: 5 MMOL/L — SIGNIFICANT CHANGE UP (ref 3.5–5)
POTASSIUM SERPL-MCNC: 5.1 MMOL/L — HIGH (ref 3.5–5)
POTASSIUM SERPL-SCNC: 4.3 MMOL/L
POTASSIUM SERPL-SCNC: 4.6 MMOL/L
POTASSIUM SERPL-SCNC: 4.7 MMOL/L
POTASSIUM SERPL-SCNC: 4.7 MMOL/L — SIGNIFICANT CHANGE UP (ref 3.5–5)
POTASSIUM SERPL-SCNC: 4.7 MMOL/L — SIGNIFICANT CHANGE UP (ref 3.5–5)
POTASSIUM SERPL-SCNC: 4.8 MMOL/L — SIGNIFICANT CHANGE UP (ref 3.5–5)
POTASSIUM SERPL-SCNC: 4.8 MMOL/L — SIGNIFICANT CHANGE UP (ref 3.5–5)
POTASSIUM SERPL-SCNC: 4.9 MMOL/L — SIGNIFICANT CHANGE UP (ref 3.5–5)
POTASSIUM SERPL-SCNC: 5 MMOL/L
POTASSIUM SERPL-SCNC: 5 MMOL/L — SIGNIFICANT CHANGE UP (ref 3.5–5)
POTASSIUM SERPL-SCNC: 5 MMOL/L — SIGNIFICANT CHANGE UP (ref 3.5–5)
POTASSIUM SERPL-SCNC: 5.1 MMOL/L — HIGH (ref 3.5–5)
POTASSIUM SERPL-SCNC: 5.3 MMOL/L
POTASSIUM SERPL-SCNC: 5.4 MMOL/L
POTASSIUM SERPL-SCNC: 5.8 MMOL/L
PROCALCITONIN SERPL-MCNC: 0.05 NG/ML — SIGNIFICANT CHANGE UP (ref 0.02–0.1)
PROT FLD-MCNC: 5.5 G/DL — SIGNIFICANT CHANGE UP
PROT SERPL-MCNC: 6.7 G/DL — SIGNIFICANT CHANGE UP (ref 6–8)
PROT SERPL-MCNC: 6.9 G/DL
PROT SERPL-MCNC: 6.9 G/DL
PROT SERPL-MCNC: 7 G/DL — SIGNIFICANT CHANGE UP (ref 6–8)
PROT SERPL-MCNC: 7.1 G/DL
PROT SERPL-MCNC: 7.1 G/DL — SIGNIFICANT CHANGE UP (ref 6–8)
PROT SERPL-MCNC: 7.2 G/DL
PROT SERPL-MCNC: 7.3 G/DL — SIGNIFICANT CHANGE UP (ref 6–8)
PROT SERPL-MCNC: 7.4 G/DL — SIGNIFICANT CHANGE UP (ref 6–8)
PROT SERPL-MCNC: 7.5 G/DL
PROT SERPL-MCNC: 7.5 G/DL — SIGNIFICANT CHANGE UP (ref 6–8)
PROT SERPL-MCNC: 7.6 G/DL — SIGNIFICANT CHANGE UP (ref 6–8)
PROT SERPL-MCNC: 7.6 G/DL — SIGNIFICANT CHANGE UP (ref 6–8)
PROT UR-MCNC: NEGATIVE — SIGNIFICANT CHANGE UP
PROTHROM AB SERPL-ACNC: 12.9 SEC — HIGH (ref 9.95–12.87)
PROTHROM AB SERPL-ACNC: 13.4 SEC — HIGH (ref 9.95–12.87)
PROTHROM AB SERPL-ACNC: 13.7 SEC — HIGH (ref 9.95–12.87)
RBC # BLD: 5.22 M/UL
RBC # BLD: 5.38 M/UL
RBC # BLD: 5.88 M/UL
RBC # BLD: 6.05 M/UL
RBC # BLD: 6.16 M/UL
RBC # BLD: 6.34 M/UL
RBC # BLD: 6.55 M/UL
RBC # BLD: 6.64 M/UL — HIGH (ref 4.7–6.1)
RBC # BLD: 6.78 M/UL — HIGH (ref 4.7–6.1)
RBC # BLD: 6.89 M/UL — HIGH (ref 4.7–6.1)
RBC # BLD: 6.97 M/UL — HIGH (ref 4.7–6.1)
RBC # BLD: 7.04 M/UL — HIGH (ref 4.7–6.1)
RBC # BLD: 7.22 M/UL — HIGH (ref 4.7–6.1)
RBC # BLD: 7.48 M/UL — HIGH (ref 4.7–6.1)
RBC # BLD: 7.67 M/UL — HIGH (ref 4.7–6.1)
RBC # FLD: 14.7 %
RBC # FLD: 15 %
RBC # FLD: 16.6 %
RBC # FLD: 17.1 %
RBC # FLD: 17.4 %
RBC # FLD: 17.5 %
RBC # FLD: 17.6 % — HIGH (ref 11.5–14.5)
RBC # FLD: 17.9 % — HIGH (ref 11.5–14.5)
RBC # FLD: 18 % — HIGH (ref 11.5–14.5)
RBC # FLD: 18.1 % — HIGH (ref 11.5–14.5)
RBC # FLD: 18.2 % — HIGH (ref 11.5–14.5)
RBC # FLD: 18.3 % — HIGH (ref 11.5–14.5)
RBC # FLD: 18.4 %
RBC # FLD: 18.6 % — HIGH (ref 11.5–14.5)
RBC # FLD: 18.6 % — HIGH (ref 11.5–14.5)
RBC BLD AUTO: ABNORMAL
RCV VOL RI: 1000 /UL — HIGH (ref 0–0)
SARS-COV-2 RNA SPEC QL NAA+PROBE: SIGNIFICANT CHANGE UP
SARS-COV-2 RNA SPEC QL NAA+PROBE: SIGNIFICANT CHANGE UP
SODIUM SERPL-SCNC: 131 MMOL/L — LOW (ref 135–146)
SODIUM SERPL-SCNC: 132 MMOL/L — LOW (ref 135–146)
SODIUM SERPL-SCNC: 134 MMOL/L
SODIUM SERPL-SCNC: 134 MMOL/L
SODIUM SERPL-SCNC: 134 MMOL/L — LOW (ref 135–146)
SODIUM SERPL-SCNC: 135 MMOL/L
SODIUM SERPL-SCNC: 135 MMOL/L — SIGNIFICANT CHANGE UP (ref 135–146)
SODIUM SERPL-SCNC: 136 MMOL/L — SIGNIFICANT CHANGE UP (ref 135–146)
SODIUM SERPL-SCNC: 136 MMOL/L — SIGNIFICANT CHANGE UP (ref 135–146)
SODIUM SERPL-SCNC: 137 MMOL/L
SODIUM SERPL-SCNC: 139 MMOL/L
SODIUM SERPL-SCNC: 140 MMOL/L
SODIUM SERPL-SCNC: 142 MMOL/L
SP GR SPEC: 1 — LOW (ref 1.01–1.03)
TIBC SERPL-MCNC: 322 UG/DL — SIGNIFICANT CHANGE UP (ref 220–430)
TOTAL NUCLEATED CELL COUNT, BODY FLUID: 28 /UL — SIGNIFICANT CHANGE UP
TRIGL SERPL-MCNC: 112 MG/DL — SIGNIFICANT CHANGE UP
TROPONIN T SERPL-MCNC: 0.02 NG/ML — HIGH
TROPONIN T SERPL-MCNC: <0.01 NG/ML — SIGNIFICANT CHANGE UP
TSH SERPL-ACNC: 0.01 UIU/ML
TSH SERPL-ACNC: 0.27 UIU/ML
TSH SERPL-ACNC: 1.22 UIU/ML
TSH SERPL-ACNC: 2.27 UIU/ML
TSH SERPL-ACNC: 2.69 UIU/ML
TSH SERPL-ACNC: 46.84 UIU/ML
TSH SERPL-ACNC: 54.84 UIU/ML
TSH SERPL-MCNC: 2.69 UIU/ML — SIGNIFICANT CHANGE UP (ref 0.27–4.2)
UIBC SERPL-MCNC: 273 UG/DL — SIGNIFICANT CHANGE UP (ref 110–370)
UROBILINOGEN FLD QL: SIGNIFICANT CHANGE UP
VARIANT LYMPHS # BLD: 1.8 % — SIGNIFICANT CHANGE UP (ref 0–5)
VIRUS SPEC CULT: SIGNIFICANT CHANGE UP
WBC # BLD: 10.22 K/UL — SIGNIFICANT CHANGE UP (ref 4.8–10.8)
WBC # BLD: 10.51 K/UL — SIGNIFICANT CHANGE UP (ref 4.8–10.8)
WBC # BLD: 12.26 K/UL — HIGH (ref 4.8–10.8)
WBC # BLD: 13.72 K/UL — HIGH (ref 4.8–10.8)
WBC # BLD: 14.1 K/UL — HIGH (ref 4.8–10.8)
WBC # BLD: 14.91 K/UL — HIGH (ref 4.8–10.8)
WBC # BLD: 17.46 K/UL — HIGH (ref 4.8–10.8)
WBC # BLD: 18.94 K/UL — HIGH (ref 4.8–10.8)
WBC # FLD AUTO: 10.22 K/UL — SIGNIFICANT CHANGE UP (ref 4.8–10.8)
WBC # FLD AUTO: 10.51 K/UL — SIGNIFICANT CHANGE UP (ref 4.8–10.8)
WBC # FLD AUTO: 12.26 K/UL — HIGH (ref 4.8–10.8)
WBC # FLD AUTO: 13.72 K/UL — HIGH (ref 4.8–10.8)
WBC # FLD AUTO: 14.1 K/UL — HIGH (ref 4.8–10.8)
WBC # FLD AUTO: 14.91 K/UL — HIGH (ref 4.8–10.8)
WBC # FLD AUTO: 17.46 K/UL — HIGH (ref 4.8–10.8)
WBC # FLD AUTO: 18.94 K/UL — HIGH (ref 4.8–10.8)
WBC # FLD AUTO: 7.82 K/UL
WBC # FLD AUTO: 7.9 K/UL
WBC # FLD AUTO: 8.19 K/UL
WBC # FLD AUTO: 8.98 K/UL
WBC # FLD AUTO: 9.39 K/UL
WBC # FLD AUTO: 9.79 K/UL
WBC # FLD AUTO: 9.87 K/UL

## 2022-01-01 PROCEDURE — 88112 CYTOPATH CELL ENHANCE TECH: CPT | Mod: 26

## 2022-01-01 PROCEDURE — 99233 SBSQ HOSP IP/OBS HIGH 50: CPT

## 2022-01-01 PROCEDURE — 99223 1ST HOSP IP/OBS HIGH 75: CPT

## 2022-01-01 PROCEDURE — 99214 OFFICE O/P EST MOD 30 MIN: CPT

## 2022-01-01 PROCEDURE — 71250 CT THORAX DX C-: CPT | Mod: 26

## 2022-01-01 PROCEDURE — 31652 BRONCH EBUS SAMPLNG 1/2 NODE: CPT

## 2022-01-01 PROCEDURE — 93010 ELECTROCARDIOGRAM REPORT: CPT

## 2022-01-01 PROCEDURE — 99232 SBSQ HOSP IP/OBS MODERATE 35: CPT

## 2022-01-01 PROCEDURE — 33016 PERICARDIOCENTESIS W/IMAGING: CPT | Mod: KX

## 2022-01-01 PROCEDURE — 99233 SBSQ HOSP IP/OBS HIGH 50: CPT | Mod: 25

## 2022-01-01 PROCEDURE — 88305 TISSUE EXAM BY PATHOLOGIST: CPT | Mod: 26

## 2022-01-01 PROCEDURE — 88341 IMHCHEM/IMCYTCHM EA ADD ANTB: CPT | Mod: 26

## 2022-01-01 PROCEDURE — 71045 X-RAY EXAM CHEST 1 VIEW: CPT | Mod: 26

## 2022-01-01 PROCEDURE — 71046 X-RAY EXAM CHEST 2 VIEWS: CPT | Mod: 26

## 2022-01-01 PROCEDURE — 99232 SBSQ HOSP IP/OBS MODERATE 35: CPT | Mod: 25

## 2022-01-01 PROCEDURE — 93306 TTE W/DOPPLER COMPLETE: CPT | Mod: 26

## 2022-01-01 PROCEDURE — 99205 OFFICE O/P NEW HI 60 MIN: CPT

## 2022-01-01 PROCEDURE — 99285 EMERGENCY DEPT VISIT HI MDM: CPT

## 2022-01-01 PROCEDURE — 71045 X-RAY EXAM CHEST 1 VIEW: CPT | Mod: 26,77

## 2022-01-01 PROCEDURE — 88342 IMHCHEM/IMCYTCHM 1ST ANTB: CPT | Mod: 26

## 2022-01-01 PROCEDURE — 99215 OFFICE O/P EST HI 40 MIN: CPT

## 2022-01-01 RX ORDER — ENOXAPARIN SODIUM 100 MG/ML
40 INJECTION SUBCUTANEOUS ONCE
Refills: 0 | Status: COMPLETED | OUTPATIENT
Start: 2022-01-01 | End: 2022-01-01

## 2022-01-01 RX ORDER — IPILIMUMAB 5 MG/ML
77 INJECTION INTRAVENOUS ONCE
Refills: 0 | Status: COMPLETED | OUTPATIENT
Start: 2022-01-01 | End: 2022-01-01

## 2022-01-01 RX ORDER — FUROSEMIDE 40 MG
20 TABLET ORAL DAILY
Refills: 0 | Status: DISCONTINUED | OUTPATIENT
Start: 2022-01-01 | End: 2022-01-01

## 2022-01-01 RX ORDER — ENOXAPARIN SODIUM 100 MG/ML
40 INJECTION SUBCUTANEOUS EVERY 24 HOURS
Refills: 0 | Status: DISCONTINUED | OUTPATIENT
Start: 2022-01-01 | End: 2022-01-01

## 2022-01-01 RX ORDER — ATORVASTATIN CALCIUM 80 MG/1
1 TABLET, FILM COATED ORAL
Qty: 30 | Refills: 3
Start: 2022-01-01 | End: 2022-01-01

## 2022-01-01 RX ORDER — LISINOPRIL/HYDROCHLOROTHIAZIDE 10-12.5 MG
1 TABLET ORAL
Qty: 30 | Refills: 3
Start: 2022-01-01 | End: 2022-01-01

## 2022-01-01 RX ORDER — LANOLIN ALCOHOL/MO/W.PET/CERES
10 CREAM (GRAM) TOPICAL AT BEDTIME
Refills: 0 | Status: DISCONTINUED | OUTPATIENT
Start: 2022-01-01 | End: 2022-01-01

## 2022-01-01 RX ORDER — NIVOLUMAB 10 MG/ML
360 INJECTION INTRAVENOUS ONCE
Refills: 0 | Status: COMPLETED | OUTPATIENT
Start: 2022-01-01 | End: 2022-01-01

## 2022-01-01 RX ORDER — OXYCODONE AND ACETAMINOPHEN 5; 325 MG/1; MG/1
5-325 TABLET ORAL
Qty: 21 | Refills: 0 | Status: DISCONTINUED | COMMUNITY
Start: 2019-11-04 | End: 2022-01-01

## 2022-01-01 RX ORDER — BUDESONIDE AND FORMOTEROL FUMARATE DIHYDRATE 160; 4.5 UG/1; UG/1
2 AEROSOL RESPIRATORY (INHALATION)
Refills: 0 | Status: DISCONTINUED | OUTPATIENT
Start: 2022-01-01 | End: 2022-01-01

## 2022-01-01 RX ORDER — MORPHINE SULFATE 50 MG/1
2 CAPSULE, EXTENDED RELEASE ORAL
Refills: 0 | Status: DISCONTINUED | OUTPATIENT
Start: 2022-01-01 | End: 2022-01-01

## 2022-01-01 RX ORDER — DIPHENHYDRAMINE HCL 50 MG
50 CAPSULE ORAL ONCE
Refills: 0 | Status: COMPLETED | OUTPATIENT
Start: 2022-01-01 | End: 2022-01-01

## 2022-01-01 RX ORDER — ATORVASTATIN CALCIUM 80 MG/1
1 TABLET, FILM COATED ORAL
Qty: 0 | Refills: 0 | DISCHARGE

## 2022-01-01 RX ORDER — IPRATROPIUM/ALBUTEROL SULFATE 18-103MCG
3 AEROSOL WITH ADAPTER (GRAM) INHALATION EVERY 6 HOURS
Refills: 0 | Status: DISCONTINUED | OUTPATIENT
Start: 2022-01-01 | End: 2022-01-01

## 2022-01-01 RX ORDER — LANOLIN ALCOHOL/MO/W.PET/CERES
3 CREAM (GRAM) TOPICAL AT BEDTIME
Refills: 0 | Status: DISCONTINUED | OUTPATIENT
Start: 2022-01-01 | End: 2022-01-01

## 2022-01-01 RX ORDER — INSULIN LISPRO 100/ML
VIAL (ML) SUBCUTANEOUS AT BEDTIME
Refills: 0 | Status: DISCONTINUED | OUTPATIENT
Start: 2022-01-01 | End: 2022-01-01

## 2022-01-01 RX ORDER — OXYCODONE AND ACETAMINOPHEN 5; 325 MG/1; MG/1
1 TABLET ORAL EVERY 4 HOURS
Refills: 0 | Status: DISCONTINUED | OUTPATIENT
Start: 2022-01-01 | End: 2022-01-01

## 2022-01-01 RX ORDER — OXYCODONE AND ACETAMINOPHEN 5; 325 MG/1; MG/1
1 TABLET ORAL ONCE
Refills: 0 | Status: DISCONTINUED | OUTPATIENT
Start: 2022-01-01 | End: 2022-01-01

## 2022-01-01 RX ORDER — MORPHINE SULFATE 50 MG/1
2 CAPSULE, EXTENDED RELEASE ORAL ONCE
Refills: 0 | Status: DISCONTINUED | OUTPATIENT
Start: 2022-01-01 | End: 2022-01-01

## 2022-01-01 RX ORDER — FUROSEMIDE 40 MG
40 TABLET ORAL ONCE
Refills: 0 | Status: COMPLETED | OUTPATIENT
Start: 2022-01-01 | End: 2022-01-01

## 2022-01-01 RX ORDER — INSULIN LISPRO 100/ML
5 VIAL (ML) SUBCUTANEOUS
Refills: 0 | Status: DISCONTINUED | OUTPATIENT
Start: 2022-01-01 | End: 2022-01-01

## 2022-01-01 RX ORDER — LISINOPRIL/HYDROCHLOROTHIAZIDE 10-12.5 MG
1 TABLET ORAL
Qty: 0 | Refills: 0 | DISCHARGE

## 2022-01-01 RX ORDER — ONDANSETRON 8 MG/1
4 TABLET, FILM COATED ORAL EVERY 8 HOURS
Refills: 0 | Status: DISCONTINUED | OUTPATIENT
Start: 2022-01-01 | End: 2022-01-01

## 2022-01-01 RX ORDER — HYDROCORTISONE 20 MG
100 TABLET ORAL ONCE
Refills: 0 | Status: COMPLETED | OUTPATIENT
Start: 2022-01-01 | End: 2022-01-01

## 2022-01-01 RX ORDER — ACETAMINOPHEN 500 MG
650 TABLET ORAL EVERY 6 HOURS
Refills: 0 | Status: DISCONTINUED | OUTPATIENT
Start: 2022-01-01 | End: 2022-01-01

## 2022-01-01 RX ORDER — FUROSEMIDE 40 MG
20 TABLET ORAL EVERY 12 HOURS
Refills: 0 | Status: DISCONTINUED | OUTPATIENT
Start: 2022-01-01 | End: 2022-01-01

## 2022-01-01 RX ORDER — SODIUM CHLORIDE 9 MG/ML
1000 INJECTION, SOLUTION INTRAVENOUS
Refills: 0 | Status: DISCONTINUED | OUTPATIENT
Start: 2022-01-01 | End: 2022-01-01

## 2022-01-01 RX ORDER — OXYCODONE 5 MG/1
5 TABLET ORAL EVERY 8 HOURS
Qty: 21 | Refills: 0 | Status: DISCONTINUED | COMMUNITY
Start: 2022-01-01 | End: 2022-01-01

## 2022-01-01 RX ORDER — SITAGLIPTIN 50 MG/1
1 TABLET, FILM COATED ORAL
Qty: 30 | Refills: 0
Start: 2022-01-01 | End: 2022-01-01

## 2022-01-01 RX ORDER — HEPARIN SODIUM 5000 [USP'U]/ML
5000 INJECTION INTRAVENOUS; SUBCUTANEOUS EVERY 8 HOURS
Refills: 0 | Status: DISCONTINUED | OUTPATIENT
Start: 2022-01-01 | End: 2022-01-01

## 2022-01-01 RX ORDER — DIPHENHYDRAMINE HCL 50 MG
50 CAPSULE ORAL
Refills: 0 | Status: COMPLETED | OUTPATIENT
Start: 2022-01-01 | End: 2022-01-01

## 2022-01-01 RX ORDER — METFORMIN HYDROCHLORIDE 850 MG/1
0 TABLET ORAL
Qty: 0 | Refills: 0 | DISCHARGE

## 2022-01-01 RX ORDER — IPRATROPIUM/ALBUTEROL SULFATE 18-103MCG
3 AEROSOL WITH ADAPTER (GRAM) INHALATION ONCE
Refills: 0 | Status: COMPLETED | OUTPATIENT
Start: 2022-01-01 | End: 2022-01-01

## 2022-01-01 RX ORDER — METOPROLOL TARTRATE 50 MG
50 TABLET ORAL
Refills: 0 | Status: DISCONTINUED | OUTPATIENT
Start: 2022-01-01 | End: 2022-01-01

## 2022-01-01 RX ORDER — LISINOPRIL 2.5 MG/1
1 TABLET ORAL
Qty: 0 | Refills: 0 | DISCHARGE

## 2022-01-01 RX ORDER — DEXTROSE 50 % IN WATER 50 %
12.5 SYRINGE (ML) INTRAVENOUS ONCE
Refills: 0 | Status: DISCONTINUED | OUTPATIENT
Start: 2022-01-01 | End: 2022-01-01

## 2022-01-01 RX ORDER — METFORMIN HYDROCHLORIDE 850 MG/1
1 TABLET ORAL
Qty: 60 | Refills: 2
Start: 2022-01-01 | End: 2022-01-01

## 2022-01-01 RX ORDER — HYDROCHLOROTHIAZIDE 25 MG
12.5 TABLET ORAL DAILY
Refills: 0 | Status: DISCONTINUED | OUTPATIENT
Start: 2022-01-01 | End: 2022-01-01

## 2022-01-01 RX ORDER — ATORVASTATIN CALCIUM 80 MG/1
40 TABLET, FILM COATED ORAL AT BEDTIME
Refills: 0 | Status: DISCONTINUED | OUTPATIENT
Start: 2022-01-01 | End: 2022-01-01

## 2022-01-01 RX ORDER — LISINOPRIL 2.5 MG/1
20 TABLET ORAL DAILY
Refills: 0 | Status: DISCONTINUED | OUTPATIENT
Start: 2022-01-01 | End: 2022-01-01

## 2022-01-01 RX ORDER — OXYCODONE 5 MG/1
5 TABLET ORAL EVERY 8 HOURS
Qty: 90 | Refills: 0 | Status: ACTIVE | COMMUNITY
Start: 2022-01-01 | End: 1900-01-01

## 2022-01-01 RX ORDER — INSULIN LISPRO 100/ML
VIAL (ML) SUBCUTANEOUS
Refills: 0 | Status: DISCONTINUED | OUTPATIENT
Start: 2022-01-01 | End: 2022-01-01

## 2022-01-01 RX ORDER — DEXTROSE 50 % IN WATER 50 %
25 SYRINGE (ML) INTRAVENOUS ONCE
Refills: 0 | Status: DISCONTINUED | OUTPATIENT
Start: 2022-01-01 | End: 2022-01-01

## 2022-01-01 RX ORDER — SACUBITRIL AND VALSARTAN 24; 26 MG/1; MG/1
24-26 TABLET, FILM COATED ORAL
Refills: 0 | Status: ACTIVE | COMMUNITY

## 2022-01-01 RX ORDER — IPRATROPIUM/ALBUTEROL SULFATE 18-103MCG
3 AEROSOL WITH ADAPTER (GRAM) INHALATION
Qty: 360 | Refills: 3
Start: 2022-01-01 | End: 2022-01-01

## 2022-01-01 RX ORDER — LORAZEPAM 0.5 MG/1
0.5 TABLET ORAL EVERY 8 HOURS
Qty: 90 | Refills: 0 | Status: ACTIVE | COMMUNITY
Start: 2022-01-01 | End: 1900-01-01

## 2022-01-01 RX ORDER — PANTOPRAZOLE SODIUM 20 MG/1
40 TABLET, DELAYED RELEASE ORAL
Refills: 0 | Status: DISCONTINUED | OUTPATIENT
Start: 2022-01-01 | End: 2022-01-01

## 2022-01-01 RX ORDER — METOPROLOL TARTRATE 50 MG
50 TABLET ORAL
Qty: 0 | Refills: 0 | DISCHARGE

## 2022-01-01 RX ORDER — ACETAMINOPHEN 500 MG
1000 TABLET ORAL ONCE
Refills: 0 | Status: COMPLETED | OUTPATIENT
Start: 2022-01-01 | End: 2022-01-01

## 2022-01-01 RX ORDER — METFORMIN HYDROCHLORIDE 850 MG/1
1 TABLET ORAL
Qty: 60 | Refills: 3
Start: 2022-01-01 | End: 2022-01-01

## 2022-01-01 RX ORDER — INSULIN GLARGINE 100 [IU]/ML
15 INJECTION, SOLUTION SUBCUTANEOUS AT BEDTIME
Refills: 0 | Status: DISCONTINUED | OUTPATIENT
Start: 2022-01-01 | End: 2022-01-01

## 2022-01-01 RX ORDER — GLUCAGON INJECTION, SOLUTION 0.5 MG/.1ML
1 INJECTION, SOLUTION SUBCUTANEOUS ONCE
Refills: 0 | Status: DISCONTINUED | OUTPATIENT
Start: 2022-01-01 | End: 2022-01-01

## 2022-01-01 RX ORDER — TIOTROPIUM BROMIDE 18 UG/1
1 CAPSULE ORAL; RESPIRATORY (INHALATION) DAILY
Refills: 0 | Status: DISCONTINUED | OUTPATIENT
Start: 2022-01-01 | End: 2022-01-01

## 2022-01-01 RX ORDER — BUDESONIDE AND FORMOTEROL FUMARATE DIHYDRATE 160; 4.5 UG/1; UG/1
2 AEROSOL RESPIRATORY (INHALATION)
Qty: 2 | Refills: 3
Start: 2022-01-01 | End: 2022-01-01

## 2022-01-01 RX ORDER — LEVOTHYROXINE SODIUM 0.07 MG/1
75 TABLET ORAL DAILY
Qty: 30 | Refills: 6 | Status: ACTIVE | COMMUNITY
Start: 2022-01-01 | End: 1900-01-01

## 2022-01-01 RX ORDER — MORPHINE SULFATE 50 MG/1
2 CAPSULE, EXTENDED RELEASE ORAL EVERY 6 HOURS
Refills: 0 | Status: DISCONTINUED | OUTPATIENT
Start: 2022-01-01 | End: 2022-01-01

## 2022-01-01 RX ORDER — LISINOPRIL 20 MG/1
20 TABLET ORAL
Refills: 0 | Status: COMPLETED | COMMUNITY
End: 2022-01-01

## 2022-01-01 RX ORDER — METOPROLOL TARTRATE 50 MG
1 TABLET ORAL
Qty: 30 | Refills: 3
Start: 2022-01-01 | End: 2022-01-01

## 2022-01-01 RX ORDER — DEXTROSE 50 % IN WATER 50 %
15 SYRINGE (ML) INTRAVENOUS ONCE
Refills: 0 | Status: DISCONTINUED | OUTPATIENT
Start: 2022-01-01 | End: 2022-01-01

## 2022-01-01 RX ADMIN — NIVOLUMAB 360 MILLIGRAM(S): 10 INJECTION INTRAVENOUS at 12:29

## 2022-01-01 RX ADMIN — ATORVASTATIN CALCIUM 40 MILLIGRAM(S): 80 TABLET, FILM COATED ORAL at 00:00

## 2022-01-01 RX ADMIN — Medication 1: at 08:18

## 2022-01-01 RX ADMIN — ATORVASTATIN CALCIUM 40 MILLIGRAM(S): 80 TABLET, FILM COATED ORAL at 21:43

## 2022-01-01 RX ADMIN — NIVOLUMAB 360 MILLIGRAM(S): 10 INJECTION INTRAVENOUS at 11:52

## 2022-01-01 RX ADMIN — Medication 5 UNIT(S): at 12:07

## 2022-01-01 RX ADMIN — OXYCODONE AND ACETAMINOPHEN 1 TABLET(S): 5; 325 TABLET ORAL at 23:11

## 2022-01-01 RX ADMIN — Medication 10 MILLIGRAM(S): at 21:37

## 2022-01-01 RX ADMIN — BUDESONIDE AND FORMOTEROL FUMARATE DIHYDRATE 2 PUFF(S): 160; 4.5 AEROSOL RESPIRATORY (INHALATION) at 07:58

## 2022-01-01 RX ADMIN — Medication 50 MILLIGRAM(S): at 18:07

## 2022-01-01 RX ADMIN — ENOXAPARIN SODIUM 40 MILLIGRAM(S): 100 INJECTION SUBCUTANEOUS at 05:21

## 2022-01-01 RX ADMIN — Medication 1: at 08:47

## 2022-01-01 RX ADMIN — MORPHINE SULFATE 2 MILLIGRAM(S): 50 CAPSULE, EXTENDED RELEASE ORAL at 10:38

## 2022-01-01 RX ADMIN — MORPHINE SULFATE 2 MILLIGRAM(S): 50 CAPSULE, EXTENDED RELEASE ORAL at 05:38

## 2022-01-01 RX ADMIN — Medication 5 UNIT(S): at 08:11

## 2022-01-01 RX ADMIN — Medication 50 MILLIGRAM(S): at 06:18

## 2022-01-01 RX ADMIN — MORPHINE SULFATE 2 MILLIGRAM(S): 50 CAPSULE, EXTENDED RELEASE ORAL at 11:20

## 2022-01-01 RX ADMIN — Medication 5 UNIT(S): at 08:46

## 2022-01-01 RX ADMIN — Medication 12.5 MILLIGRAM(S): at 05:13

## 2022-01-01 RX ADMIN — PANTOPRAZOLE SODIUM 40 MILLIGRAM(S): 20 TABLET, DELAYED RELEASE ORAL at 06:25

## 2022-01-01 RX ADMIN — Medication 3: at 12:59

## 2022-01-01 RX ADMIN — Medication 1: at 07:56

## 2022-01-01 RX ADMIN — MORPHINE SULFATE 2 MILLIGRAM(S): 50 CAPSULE, EXTENDED RELEASE ORAL at 22:42

## 2022-01-01 RX ADMIN — PANTOPRAZOLE SODIUM 40 MILLIGRAM(S): 20 TABLET, DELAYED RELEASE ORAL at 05:20

## 2022-01-01 RX ADMIN — OXYCODONE AND ACETAMINOPHEN 1 TABLET(S): 5; 325 TABLET ORAL at 09:20

## 2022-01-01 RX ADMIN — Medication 1000 MILLIGRAM(S): at 11:00

## 2022-01-01 RX ADMIN — NIVOLUMAB 360 MILLIGRAM(S): 10 INJECTION INTRAVENOUS at 10:29

## 2022-01-01 RX ADMIN — ATORVASTATIN CALCIUM 40 MILLIGRAM(S): 80 TABLET, FILM COATED ORAL at 21:22

## 2022-01-01 RX ADMIN — NIVOLUMAB 360 MILLIGRAM(S): 10 INJECTION INTRAVENOUS at 12:52

## 2022-01-01 RX ADMIN — OXYCODONE AND ACETAMINOPHEN 1 TABLET(S): 5; 325 TABLET ORAL at 20:23

## 2022-01-01 RX ADMIN — IPILIMUMAB 77 MILLIGRAM(S): 5 INJECTION INTRAVENOUS at 11:24

## 2022-01-01 RX ADMIN — OXYCODONE AND ACETAMINOPHEN 1 TABLET(S): 5; 325 TABLET ORAL at 15:10

## 2022-01-01 RX ADMIN — Medication 50 MILLIGRAM(S): at 06:29

## 2022-01-01 RX ADMIN — Medication 10 MILLIGRAM(S): at 22:42

## 2022-01-01 RX ADMIN — TIOTROPIUM BROMIDE 1 CAPSULE(S): 18 CAPSULE ORAL; RESPIRATORY (INHALATION) at 08:12

## 2022-01-01 RX ADMIN — BUDESONIDE AND FORMOTEROL FUMARATE DIHYDRATE 2 PUFF(S): 160; 4.5 AEROSOL RESPIRATORY (INHALATION) at 08:12

## 2022-01-01 RX ADMIN — OXYCODONE AND ACETAMINOPHEN 1 TABLET(S): 5; 325 TABLET ORAL at 00:23

## 2022-01-01 RX ADMIN — Medication 40 MILLIGRAM(S): at 06:18

## 2022-01-01 RX ADMIN — Medication 100 MILLIGRAM(S): at 11:45

## 2022-01-01 RX ADMIN — TIOTROPIUM BROMIDE 1 CAPSULE(S): 18 CAPSULE ORAL; RESPIRATORY (INHALATION) at 07:59

## 2022-01-01 RX ADMIN — Medication 50 MILLIGRAM(S): at 05:21

## 2022-01-01 RX ADMIN — Medication 3: at 08:11

## 2022-01-01 RX ADMIN — Medication 50 MILLIGRAM(S): at 12:10

## 2022-01-01 RX ADMIN — Medication 5 UNIT(S): at 07:58

## 2022-01-01 RX ADMIN — Medication 40 MILLIGRAM(S): at 22:39

## 2022-01-01 RX ADMIN — MORPHINE SULFATE 2 MILLIGRAM(S): 50 CAPSULE, EXTENDED RELEASE ORAL at 06:24

## 2022-01-01 RX ADMIN — Medication 400 MILLIGRAM(S): at 10:45

## 2022-01-01 RX ADMIN — BUDESONIDE AND FORMOTEROL FUMARATE DIHYDRATE 2 PUFF(S): 160; 4.5 AEROSOL RESPIRATORY (INHALATION) at 14:32

## 2022-01-01 RX ADMIN — NIVOLUMAB 360 MILLIGRAM(S): 10 INJECTION INTRAVENOUS at 13:45

## 2022-01-01 RX ADMIN — ENOXAPARIN SODIUM 40 MILLIGRAM(S): 100 INJECTION SUBCUTANEOUS at 17:00

## 2022-01-01 RX ADMIN — MORPHINE SULFATE 2 MILLIGRAM(S): 50 CAPSULE, EXTENDED RELEASE ORAL at 13:01

## 2022-01-01 RX ADMIN — Medication 50 MILLIGRAM(S): at 05:29

## 2022-01-01 RX ADMIN — HEPARIN SODIUM 5000 UNIT(S): 5000 INJECTION INTRAVENOUS; SUBCUTANEOUS at 13:05

## 2022-01-01 RX ADMIN — OXYCODONE AND ACETAMINOPHEN 1 TABLET(S): 5; 325 TABLET ORAL at 19:30

## 2022-01-01 RX ADMIN — Medication 50 MILLIGRAM(S): at 17:31

## 2022-01-01 RX ADMIN — Medication 102 MILLIGRAM(S): at 10:54

## 2022-01-01 RX ADMIN — Medication 50 MILLIGRAM(S): at 05:13

## 2022-01-01 RX ADMIN — IPILIMUMAB 77 MILLIGRAM(S): 5 INJECTION INTRAVENOUS at 12:52

## 2022-01-01 RX ADMIN — ATORVASTATIN CALCIUM 40 MILLIGRAM(S): 80 TABLET, FILM COATED ORAL at 21:38

## 2022-01-01 RX ADMIN — MORPHINE SULFATE 2 MILLIGRAM(S): 50 CAPSULE, EXTENDED RELEASE ORAL at 00:03

## 2022-01-01 RX ADMIN — Medication 6: at 11:42

## 2022-01-01 RX ADMIN — Medication 50 MILLIGRAM(S): at 17:15

## 2022-01-01 RX ADMIN — IPILIMUMAB 77 MILLIGRAM(S): 5 INJECTION INTRAVENOUS at 12:20

## 2022-01-01 RX ADMIN — Medication 100 MILLIGRAM(S): at 11:55

## 2022-01-01 RX ADMIN — Medication 4: at 16:48

## 2022-01-01 RX ADMIN — Medication 5 UNIT(S): at 07:56

## 2022-01-01 RX ADMIN — Medication 10 MILLIGRAM(S): at 21:43

## 2022-01-01 RX ADMIN — Medication 650 MILLIGRAM(S): at 16:29

## 2022-01-01 RX ADMIN — MORPHINE SULFATE 2 MILLIGRAM(S): 50 CAPSULE, EXTENDED RELEASE ORAL at 18:30

## 2022-01-01 RX ADMIN — MORPHINE SULFATE 2 MILLIGRAM(S): 50 CAPSULE, EXTENDED RELEASE ORAL at 06:08

## 2022-01-01 RX ADMIN — Medication 3: at 08:45

## 2022-01-01 RX ADMIN — Medication 40 MILLIGRAM(S): at 05:57

## 2022-01-01 RX ADMIN — MORPHINE SULFATE 2 MILLIGRAM(S): 50 CAPSULE, EXTENDED RELEASE ORAL at 15:16

## 2022-01-01 RX ADMIN — Medication 50 MILLIGRAM(S): at 05:42

## 2022-01-01 RX ADMIN — LISINOPRIL 20 MILLIGRAM(S): 2.5 TABLET ORAL at 05:57

## 2022-01-01 RX ADMIN — OXYCODONE AND ACETAMINOPHEN 1 TABLET(S): 5; 325 TABLET ORAL at 16:28

## 2022-01-01 RX ADMIN — Medication 50 MILLIGRAM(S): at 06:49

## 2022-01-01 RX ADMIN — Medication 5 UNIT(S): at 16:40

## 2022-01-01 RX ADMIN — ATORVASTATIN CALCIUM 40 MILLIGRAM(S): 80 TABLET, FILM COATED ORAL at 22:42

## 2022-01-01 RX ADMIN — Medication 40 MILLIGRAM(S): at 05:13

## 2022-01-01 RX ADMIN — MORPHINE SULFATE 2 MILLIGRAM(S): 50 CAPSULE, EXTENDED RELEASE ORAL at 19:32

## 2022-01-01 RX ADMIN — Medication 5 UNIT(S): at 12:49

## 2022-01-01 RX ADMIN — Medication 50 MILLIGRAM(S): at 05:28

## 2022-01-01 RX ADMIN — LISINOPRIL 20 MILLIGRAM(S): 2.5 TABLET ORAL at 06:28

## 2022-01-01 RX ADMIN — Medication 10 MILLIGRAM(S): at 21:38

## 2022-01-01 RX ADMIN — Medication 100 MILLIGRAM(S): at 11:24

## 2022-01-01 RX ADMIN — LISINOPRIL 20 MILLIGRAM(S): 2.5 TABLET ORAL at 05:30

## 2022-01-01 RX ADMIN — Medication 10 MILLIGRAM(S): at 00:00

## 2022-01-01 RX ADMIN — MORPHINE SULFATE 2 MILLIGRAM(S): 50 CAPSULE, EXTENDED RELEASE ORAL at 05:54

## 2022-01-01 RX ADMIN — Medication 5 UNIT(S): at 11:26

## 2022-01-01 RX ADMIN — IPILIMUMAB 77 MILLIGRAM(S): 5 INJECTION INTRAVENOUS at 12:29

## 2022-01-01 RX ADMIN — OXYCODONE AND ACETAMINOPHEN 1 TABLET(S): 5; 325 TABLET ORAL at 18:59

## 2022-01-01 RX ADMIN — PANTOPRAZOLE SODIUM 40 MILLIGRAM(S): 20 TABLET, DELAYED RELEASE ORAL at 05:28

## 2022-01-01 RX ADMIN — IPILIMUMAB 77 MILLIGRAM(S): 5 INJECTION INTRAVENOUS at 12:55

## 2022-01-01 RX ADMIN — Medication 20 MILLIGRAM(S): at 18:27

## 2022-01-01 RX ADMIN — OXYCODONE AND ACETAMINOPHEN 1 TABLET(S): 5; 325 TABLET ORAL at 08:38

## 2022-01-01 RX ADMIN — ATORVASTATIN CALCIUM 40 MILLIGRAM(S): 80 TABLET, FILM COATED ORAL at 21:06

## 2022-01-01 RX ADMIN — Medication 3: at 11:55

## 2022-01-01 RX ADMIN — Medication 50 MILLIGRAM(S): at 17:07

## 2022-01-01 RX ADMIN — MORPHINE SULFATE 2 MILLIGRAM(S): 50 CAPSULE, EXTENDED RELEASE ORAL at 17:16

## 2022-01-01 RX ADMIN — Medication 50 MILLIGRAM(S): at 17:19

## 2022-01-01 RX ADMIN — Medication 1: at 12:02

## 2022-01-01 RX ADMIN — BUDESONIDE AND FORMOTEROL FUMARATE DIHYDRATE 2 PUFF(S): 160; 4.5 AEROSOL RESPIRATORY (INHALATION) at 08:19

## 2022-01-01 RX ADMIN — MORPHINE SULFATE 2 MILLIGRAM(S): 50 CAPSULE, EXTENDED RELEASE ORAL at 07:44

## 2022-01-01 RX ADMIN — Medication 10 MILLIGRAM(S): at 21:22

## 2022-01-01 RX ADMIN — NIVOLUMAB 360 MILLIGRAM(S): 10 INJECTION INTRAVENOUS at 15:59

## 2022-01-01 RX ADMIN — Medication 5 UNIT(S): at 17:10

## 2022-01-01 RX ADMIN — MORPHINE SULFATE 2 MILLIGRAM(S): 50 CAPSULE, EXTENDED RELEASE ORAL at 21:38

## 2022-01-01 RX ADMIN — Medication 102 MILLIGRAM(S): at 11:16

## 2022-01-01 RX ADMIN — Medication 5 UNIT(S): at 17:03

## 2022-01-01 RX ADMIN — Medication 2: at 11:27

## 2022-01-01 RX ADMIN — OXYCODONE AND ACETAMINOPHEN 1 TABLET(S): 5; 325 TABLET ORAL at 20:36

## 2022-01-01 RX ADMIN — Medication 5 UNIT(S): at 11:55

## 2022-01-01 RX ADMIN — ATORVASTATIN CALCIUM 40 MILLIGRAM(S): 80 TABLET, FILM COATED ORAL at 21:37

## 2022-01-01 RX ADMIN — Medication 5 UNIT(S): at 17:15

## 2022-01-01 RX ADMIN — MORPHINE SULFATE 2 MILLIGRAM(S): 50 CAPSULE, EXTENDED RELEASE ORAL at 18:00

## 2022-01-01 RX ADMIN — LISINOPRIL 20 MILLIGRAM(S): 2.5 TABLET ORAL at 07:58

## 2022-01-01 RX ADMIN — LISINOPRIL 20 MILLIGRAM(S): 2.5 TABLET ORAL at 06:49

## 2022-01-01 RX ADMIN — OXYCODONE AND ACETAMINOPHEN 1 TABLET(S): 5; 325 TABLET ORAL at 21:22

## 2022-01-01 RX ADMIN — Medication 5: at 17:15

## 2022-01-01 RX ADMIN — NIVOLUMAB 360 MILLIGRAM(S): 10 INJECTION INTRAVENOUS at 13:00

## 2022-01-01 RX ADMIN — BUDESONIDE AND FORMOTEROL FUMARATE DIHYDRATE 2 PUFF(S): 160; 4.5 AEROSOL RESPIRATORY (INHALATION) at 23:56

## 2022-01-01 RX ADMIN — Medication 40 MILLIGRAM(S): at 06:49

## 2022-01-01 RX ADMIN — LISINOPRIL 20 MILLIGRAM(S): 2.5 TABLET ORAL at 05:42

## 2022-01-01 RX ADMIN — Medication 5 UNIT(S): at 12:02

## 2022-01-01 RX ADMIN — Medication 12.5 MILLIGRAM(S): at 05:30

## 2022-01-01 RX ADMIN — LISINOPRIL 20 MILLIGRAM(S): 2.5 TABLET ORAL at 06:18

## 2022-01-01 RX ADMIN — PANTOPRAZOLE SODIUM 40 MILLIGRAM(S): 20 TABLET, DELAYED RELEASE ORAL at 05:42

## 2022-01-01 RX ADMIN — MORPHINE SULFATE 2 MILLIGRAM(S): 50 CAPSULE, EXTENDED RELEASE ORAL at 14:31

## 2022-01-01 RX ADMIN — OXYCODONE AND ACETAMINOPHEN 1 TABLET(S): 5; 325 TABLET ORAL at 11:57

## 2022-01-01 RX ADMIN — OXYCODONE AND ACETAMINOPHEN 1 TABLET(S): 5; 325 TABLET ORAL at 20:06

## 2022-01-01 RX ADMIN — MORPHINE SULFATE 2 MILLIGRAM(S): 50 CAPSULE, EXTENDED RELEASE ORAL at 19:59

## 2022-01-01 RX ADMIN — Medication 10 MILLIGRAM(S): at 21:16

## 2022-01-01 RX ADMIN — Medication 100 MILLIGRAM(S): at 10:54

## 2022-01-01 RX ADMIN — Medication 5 UNIT(S): at 08:10

## 2022-01-01 RX ADMIN — ATORVASTATIN CALCIUM 40 MILLIGRAM(S): 80 TABLET, FILM COATED ORAL at 21:17

## 2022-01-01 RX ADMIN — MORPHINE SULFATE 2 MILLIGRAM(S): 50 CAPSULE, EXTENDED RELEASE ORAL at 18:08

## 2022-01-01 RX ADMIN — Medication 40 MILLIGRAM(S): at 05:29

## 2022-01-01 RX ADMIN — MORPHINE SULFATE 2 MILLIGRAM(S): 50 CAPSULE, EXTENDED RELEASE ORAL at 13:58

## 2022-01-01 RX ADMIN — OXYCODONE AND ACETAMINOPHEN 1 TABLET(S): 5; 325 TABLET ORAL at 05:41

## 2022-01-01 RX ADMIN — LISINOPRIL 20 MILLIGRAM(S): 2.5 TABLET ORAL at 05:13

## 2022-01-01 RX ADMIN — Medication 5 UNIT(S): at 17:29

## 2022-01-01 RX ADMIN — Medication 40 MILLIGRAM(S): at 05:30

## 2022-01-01 RX ADMIN — Medication 100 MILLIGRAM(S): at 11:17

## 2022-01-01 RX ADMIN — Medication 1: at 17:18

## 2022-01-01 RX ADMIN — OXYCODONE AND ACETAMINOPHEN 1 TABLET(S): 5; 325 TABLET ORAL at 12:48

## 2022-01-01 RX ADMIN — Medication 3: at 17:04

## 2022-01-01 RX ADMIN — MORPHINE SULFATE 2 MILLIGRAM(S): 50 CAPSULE, EXTENDED RELEASE ORAL at 15:00

## 2022-01-01 RX ADMIN — Medication 40 MILLIGRAM(S): at 16:33

## 2022-01-01 RX ADMIN — OXYCODONE AND ACETAMINOPHEN 1 TABLET(S): 5; 325 TABLET ORAL at 12:30

## 2022-01-01 RX ADMIN — NIVOLUMAB 360 MILLIGRAM(S): 10 INJECTION INTRAVENOUS at 11:25

## 2022-01-01 RX ADMIN — Medication 3: at 17:30

## 2022-01-01 RX ADMIN — Medication 10 MILLIGRAM(S): at 21:06

## 2022-01-01 RX ADMIN — OXYCODONE AND ACETAMINOPHEN 1 TABLET(S): 5; 325 TABLET ORAL at 14:40

## 2022-01-01 RX ADMIN — Medication 5 UNIT(S): at 17:49

## 2022-01-01 RX ADMIN — MORPHINE SULFATE 2 MILLIGRAM(S): 50 CAPSULE, EXTENDED RELEASE ORAL at 14:15

## 2022-01-01 RX ADMIN — Medication 5 UNIT(S): at 16:47

## 2022-01-01 RX ADMIN — Medication 50 MILLIGRAM(S): at 17:13

## 2022-01-01 RX ADMIN — Medication 102 MILLIGRAM(S): at 10:30

## 2022-01-01 RX ADMIN — Medication 102 MILLIGRAM(S): at 11:45

## 2022-01-01 RX ADMIN — MORPHINE SULFATE 2 MILLIGRAM(S): 50 CAPSULE, EXTENDED RELEASE ORAL at 05:57

## 2022-01-01 RX ADMIN — OXYCODONE AND ACETAMINOPHEN 1 TABLET(S): 5; 325 TABLET ORAL at 18:23

## 2022-01-01 RX ADMIN — Medication 2: at 17:49

## 2022-01-01 RX ADMIN — BUDESONIDE AND FORMOTEROL FUMARATE DIHYDRATE 2 PUFF(S): 160; 4.5 AEROSOL RESPIRATORY (INHALATION) at 21:38

## 2022-01-01 RX ADMIN — MORPHINE SULFATE 2 MILLIGRAM(S): 50 CAPSULE, EXTENDED RELEASE ORAL at 06:44

## 2022-01-01 RX ADMIN — Medication 1: at 08:10

## 2022-01-01 RX ADMIN — Medication 3 MILLILITER(S): at 11:33

## 2022-01-01 RX ADMIN — Medication 50 MILLIGRAM(S): at 05:57

## 2022-01-01 RX ADMIN — MORPHINE SULFATE 2 MILLIGRAM(S): 50 CAPSULE, EXTENDED RELEASE ORAL at 21:50

## 2022-01-01 RX ADMIN — Medication 3: at 16:58

## 2022-01-01 RX ADMIN — Medication 4: at 12:07

## 2022-01-01 RX ADMIN — Medication 2: at 17:10

## 2022-01-01 RX ADMIN — OXYCODONE AND ACETAMINOPHEN 1 TABLET(S): 5; 325 TABLET ORAL at 17:17

## 2022-01-01 RX ADMIN — OXYCODONE AND ACETAMINOPHEN 1 TABLET(S): 5; 325 TABLET ORAL at 00:53

## 2022-01-01 RX ADMIN — Medication 50 MILLIGRAM(S): at 17:52

## 2022-01-01 RX ADMIN — Medication 3 MILLILITER(S): at 00:58

## 2022-01-01 RX ADMIN — Medication 12.5 MILLIGRAM(S): at 06:49

## 2022-01-01 RX ADMIN — OXYCODONE AND ACETAMINOPHEN 1 TABLET(S): 5; 325 TABLET ORAL at 23:41

## 2022-01-01 RX ADMIN — PANTOPRAZOLE SODIUM 40 MILLIGRAM(S): 20 TABLET, DELAYED RELEASE ORAL at 05:57

## 2022-01-01 RX ADMIN — Medication 3 MILLILITER(S): at 14:03

## 2022-01-01 RX ADMIN — Medication 12.5 MILLIGRAM(S): at 06:18

## 2022-01-01 RX ADMIN — Medication 50 MILLIGRAM(S): at 17:01

## 2022-01-01 RX ADMIN — OXYCODONE AND ACETAMINOPHEN 1 TABLET(S): 5; 325 TABLET ORAL at 19:20

## 2022-01-01 RX ADMIN — OXYCODONE AND ACETAMINOPHEN 1 TABLET(S): 5; 325 TABLET ORAL at 11:16

## 2022-01-01 RX ADMIN — Medication 100 MILLIGRAM(S): at 10:08

## 2022-01-01 RX ADMIN — MORPHINE SULFATE 2 MILLIGRAM(S): 50 CAPSULE, EXTENDED RELEASE ORAL at 12:33

## 2022-01-01 RX ADMIN — Medication 102 MILLIGRAM(S): at 11:56

## 2022-01-01 RX ADMIN — MORPHINE SULFATE 2 MILLIGRAM(S): 50 CAPSULE, EXTENDED RELEASE ORAL at 23:33

## 2022-01-01 RX ADMIN — Medication 2: at 16:41

## 2022-01-01 RX ADMIN — Medication 5 UNIT(S): at 17:18

## 2022-04-20 NOTE — PROVIDER CONTACT NOTE (OTHER) - SITUATION
Ta from nuclear medicine came to see pt and begin stress test process, pt is refusing to have test done. 3

## 2022-06-27 NOTE — ED PROVIDER NOTE - OBJECTIVE STATEMENT
67-year-old male presents emergency department complaining of generalized weakness for the past 5 days.  Patient states that he feels he has no energy and does not feel like himself.  Also reports shortness of breath, worse with exertion.  Seen previously by his primary care doctor earlier in the week.  No known sick contacts patient also reports decreased p.o. intake and decreased appetite.  Denies fever chills pain headache chest pain palpitations abdominal pain nausea vomiting diarrhea dizziness lightheadedness or syncope

## 2022-06-27 NOTE — H&P ADULT - NSHPPHYSICALEXAM_GEN_ALL_CORE
GENERAL: NAD, speaks in full sentences, no signs of respiratory distress  HEAD:  Atraumatic, Normocephalic  EYES: EOMI, PERRLA, anicteric sclera  NECK: Supple, No JVD  CHEST/LUNG: Clear to auscultation bilaterally; No wheeze; No crackles; No accessory muscles used  HEART: Regular rate and rhythm; No murmurs;   ABDOMEN: Soft, Nontender, Nondistended; Bowel sounds present; No guarding  EXTREMITIES:  2+ Peripheral Pulses, No cyanosis or edema  PSYCH: AAOx3  NEUROLOGY: non-focal  SKIN: No rashes or lesions GENERAL: NAD, speaks in full sentences, no signs of respiratory distress  HEAD:  Atraumatic, Normocephalic  EYES: EOMI, PERRLA, anicteric sclera  NECK: Supple, No JVD  CHEST/LUNG: + transmitted lung sounds with expiratory wheeze  HEART: Regular rate and rhythm; No murmurs;   ABDOMEN: Soft, Nontender, Nondistended; Bowel sounds present; No guarding  EXTREMITIES:  2+ Peripheral Pulses, No cyanosis or edema  PSYCH: AAOx3  NEUROLOGY: non-focal  SKIN: No rashes or lesions

## 2022-06-27 NOTE — ED PROVIDER NOTE - CLINICAL SUMMARY MEDICAL DECISION MAKING FREE TEXT BOX
Patient presented with worsening generalized weakness and dyspnea on exertion over the past few weeks to the point he reports he is not functional at home. Otherwise on arrival patient afebrile, HD stable, but rales noted b/l and grossly fluid overloaded. EKG obtained and non-specific without evidence of STEMI. Obtained labs which showed (+) mildly elevated trop and probnp but otherwise were grossly unremarkable including no significant leukocytosis, anemia, signs of dehydration/STEVENSON, transaminitis or significant electrolyte abnormalities. CXR showed (+) signs of fluid overload. Given IV lasix in ED and given the above will require admission for further management. Patient agreeable with plan. HD stable at time of admission.

## 2022-06-27 NOTE — H&P ADULT - ASSESSMENT
#Generalized Weakness  Afebrile, no leukocytosis   F/U UA, UCx  CTH -  CXR -  No signs fluid overload on PE  f/u TSH   obtain PT/OT   Electrolyte wnl, replete as needed  HTN (hypertension)      Diabetes      Dyslipidemia      CAD (coronary artery disease)      History of coronary artery stent placement  x 2 stents          #Misc  - DVT Prophylaxis:  - Diet:  - GI Prophylaxis:  - Activity:  - IV Fluids:  - Code Status:    Dispo: 67-year-old male with  PMHx of CAD/PVD s/p PCI in 2009 x2 stents (f/u with Dr. Bucio), DM, HLD, HTN, COPD, anxiety, and smoker 50 pack years s/p LLE femoral to above knee popliteal artery bypass with PTFE presents emergency department complaining of generalized weakness for the past 5 days.      #Generalized Weakness  - Afebrile, WBC 12K   - CXR- left opacity (f/u offical read)  - No signs fluid overload on PE  - F/U UA, UCx,  TSH   - obtain PT/OT     #Microcytic Anemia  - check iron studies and ferritin   - monitor CBC     #COPD  * expiratory wheeze on exam , denies any productive cough but endorses worsening sob  - start Duonebs and symbicort   - start prednisone 40mg daily     #HTN (hypertension)  *home meds : lisinopril/ HCTZ 20mg - 12.5mg   - c/w lisinopril 20mg and HCTZ 12.5mg daily  - Monitor BP     #Diabetes  *home meds: 850mg metformin  - Monitor FS (gOAL 140-180)  - Start ISS  - Check A1C     #Dyslipidemia  *patient is not taking stain per pharmacy   - Start atorvastatin 40mg daily   - check lipid profile    #CAD x 2 stents  *patient was previously on metoprolol 50mg BID but has not refilled since January per pharmacy   - will start Metroprolol 50mg BID , atorvastatin 40mg daily       #Misc  - DVT Prophylaxis:n/a  - Diet: DASH  - GI Prophylaxis: none  - Activity: AAT  - IV Fluids: none  - Code Status: full    Dispo: Acute

## 2022-06-27 NOTE — H&P ADULT - NSHPREVIEWOFSYSTEMS_GEN_ALL_CORE
CONSTITUTIONAL: No weakness, fevers or chills  EYES/ENT: No visual changes;  No vertigo or throat pain   NECK: No pain or stiffness  RESPIRATORY: No cough, wheezing, hemoptysis; No shortness of breath  CARDIOVASCULAR: No chest pain or palpitations  GASTROINTESTINAL: No abdominal or epigastric pain. No nausea, vomiting, or hematemesis; No diarrhea or constipation. No melena or hematochezia.  GENITOURINARY: No dysuria, frequency or hematuria  NEUROLOGICAL: No numbness or weakness  SKIN: No itching, rashes CONSTITUTIONAL: + weakness,  no fevers or chills  EYES/ENT: No visual changes;  No vertigo or throat pain   NECK: No pain or stiffness  RESPIRATORY: No cough, wheezing, hemoptysis; No shortness of breath  CARDIOVASCULAR: No chest pain or palpitations  GASTROINTESTINAL: No abdominal or epigastric pain. No nausea, vomiting, or hematemesis; No diarrhea or constipation. No melena or hematochezia.  GENITOURINARY: No dysuria, frequency or hematuria  NEUROLOGICAL: No numbness , + weakness  SKIN: No itching, rashes

## 2022-06-27 NOTE — H&P ADULT - HISTORY OF PRESENT ILLNESS
67-year-old male with  PMHx of CAD/PVD s/p PCI in 2009 x2 stents (f/u with Dr. Bucio), DM, HLD, HTN, COPD, anxiety, and smoker 50 pack years s/p LLE femoral to above knee popliteal artery bypass with PTFE presents emergency department complaining of generalized weakness for the past 5 days.  Patient states that he feels he has no energy and does not feel like himself.  Also reports shortness of breath, worse with exertion.  Seen previously by his primary care doctor earlier in the week.  No known sick contacts patient also reports decreased p.o. intake and decreased appetite.  Denies fever chills pain headache chest pain palpitations abdominal pain nausea vomiting diarrhea dizziness lightheadedness or syncope    In the ED, Vitals were /92, , T 98.3, RR 17, SpO2 98%. Labs were significant for WBC 12K, Hgb 13, MCV 62.8, AST 47, ALT 85, Trop 0.02, . Patient received Lasix 40mg x1     Patient is being admitted for further management and monitoring    67-year-old male with  PMHx of CAD/PVD s/p PCI in 2009 x2 stents (f/u with Dr. Bucio), DM, HLD, HTN, COPD, anxiety, and smoker 50 pack years s/p LLE femoral to above knee popliteal artery bypass with PTFE presents emergency department complaining of generalized weakness for the past 5 days.  Patient states that he feels he has no energy and does not feel like himself.  Also reports shortness of breath, worse with exertion. He also reports decreased p.o intake and decreased appetite.  No known sick contacts.   Denies fever chills pain headache chest pain palpitations abdominal pain nausea vomiting diarrhea dizziness lightheadedness or syncope    In the ED, Vitals were /92, , T 98.3, RR 17, SpO2 98%. Labs were significant for WBC 12K, Hgb 13, MCV 62.8, AST 47, ALT 85, Trop 0.02, . Patient received Lasix 40mg x1     Patient is being admitted for further management and monitoring

## 2022-06-27 NOTE — H&P ADULT - NSHPLABSRESULTS_GEN_ALL_CORE
13.0   12.26 )-----------( 205      ( 27 Jun 2022 19:09 )             41.7       06-27    134<L>  |  91<L>  |  11  ----------------------------<  194<H>  5.0   |  32  |  0.8    Ca    9.7      27 Jun 2022 19:09    TPro  7.6  /  Alb  4.6  /  TBili  0.9  /  DBili  x   /  AST  47<H>  /  ALT  85<H>  /  AlkPhos  112  06-27        Lactate Trend      CARDIAC MARKERS ( 27 Jun 2022 19:09 )  x     / 0.02 ng/mL / x     / x     / x            CAPILLARY BLOOD GLUCOSE

## 2022-06-27 NOTE — ED PROVIDER NOTE - NS ED ROS FT
CONST: Generalized weakness no fevers chills or body aches  EYES: No pain, redness, drainage or visual changes.  ENT: No ear pain or discharge, nasal discharge or congestion. No sore throat  CARD: No chest pain, palpitations  RESP:Shortness of breath, occasional orthopnea  GI: No abdominal pain, N/V/D  : No urinary symptoms  MS: No joint pain, back pain or extremity pain/injury  SKIN: No rashes  NEURO: No headache, dizziness, paresthesias or LOC

## 2022-06-27 NOTE — ED ADULT TRIAGE NOTE - CHIEF COMPLAINT QUOTE
Pt complaining od weakness and dizziness x few days, As per wife EKG wasn't normal at doctors last week.  EKG done in triage. Pt hx of DM complaining od weakness and dizziness x few days, As per wife EKG wasn't normal at doctors last week.  EKG done in triage.

## 2022-06-27 NOTE — ED PROVIDER NOTE - NS ED ATTENDING STATEMENT MOD
This was a shared visit with the TAY. I reviewed and verified the documentation and independently performed the documented:

## 2022-06-27 NOTE — H&P ADULT - ATTENDING COMMENTS
#Shortness of breath, with generalized weakness  active smoker  cxr bl opacities  check ct chest  check procal  esr, crp  cont prednisone 40  duoneb q6  symbicort    #Progress Note Handoff:  Pending (specify):  Consults_________, Tests________, Test Results_______, Other____ct chest_____  Family discussion: d/w pt at bedside re: treatment plan, primary dx  Disposition: Home___/SNF___/Other________/Unknown at this time___x_____

## 2022-06-27 NOTE — ED PROVIDER NOTE - PHYSICAL EXAMINATION
CONST: Well appearing in NAD  EYES: PERRL, EOMI, Sclera and conjunctiva clear. Vision grossly intact  ENT: No nasal discharge. TM's clear B/L without drainage. Oropharynx normal appearing, no erythema or exudates. Uvula midline.  NECK: Non-tender, no meningeal signs  CARD: Normal S1 S2; Normal rate and rhythm  RESP: Patient noted to be tachypneic, crackles heard bilateral lungs left midlung field right lower lung.  GI: Soft, non-tender, non-distended. No rebound or Guarding  MS: Normal ROM in all extremities. No midline spinal tenderness.  SKIN: Warm, dry, no acute rashes. Good turgor  NEURO: A&Ox3, No focal deficits. Strength 5/5 with no sensory deficits. Steady gait

## 2022-06-27 NOTE — ED ADULT NURSE NOTE - CHIEF COMPLAINT QUOTE
Pt hx of DM complaining od weakness and dizziness x few days, As per wife EKG wasn't normal at doctors last week.  EKG done in triage.

## 2022-06-27 NOTE — ED ADULT NURSE REASSESSMENT NOTE - NS ED NURSE REASSESS COMMENT FT1
Pt endorsed to Mayela CHADWICK. Pt pending admitted room assignment and admission orders. Remains aaox3 in NAD.

## 2022-06-28 NOTE — DISCHARGE NOTE PROVIDER - CARE PROVIDERS DIRECT ADDRESSES
,[email or web address],DirectAddress_Unknown,DirectAddress_Unknown

## 2022-06-28 NOTE — PHYSICAL THERAPY INITIAL EVALUATION ADULT - PERTINENT HX OF CURRENT PROBLEM, REHAB EVAL
67-year-old male with  PMHx of CAD/PVD s/p PCI in 2009 x2 stents (f/u with Dr. Bucio), DM, HLD, HTN, COPD, anxiety, and smoker 50 pack years s/p LLE femoral to above knee popliteal artery bypass with PTFE presents emergency department complaining of generalized weakness for the past 5 days.

## 2022-06-28 NOTE — DISCHARGE NOTE PROVIDER - NSDCMRMEDTOKEN_GEN_ALL_CORE_FT
atorvastatin 40 mg oral tablet: 1 tab(s) orally once a day (at bedtime)  budesonide-formoterol 80 mcg-4.5 mcg/inh inhalation aerosol: 2 puff(s) inhaled 2 times a day, As Needed  ipratropium-albuterol 0.5 mg-2.5 mg/3 mL inhalation solution: 3 milliliter(s) inhaled every 6 hours  lisinopril-hydrochlorothiazide 20 mg-12.5 mg oral tablet: 1 tab(s) orally once a day  metFORMIN 1000 mg oral tablet: 1 tab(s) orally every 12 hours   metFORMIN 850 mg oral tablet: 1  orally every 12 hours   metoprolol succinate 100 mg oral capsule, extended release: 1 cap(s) orally once a day    atorvastatin 40 mg oral tablet: 1 tab(s) orally once a day (at bedtime)  budesonide-formoterol 80 mcg-4.5 mcg/inh inhalation aerosol: 2 puff(s) inhaled 2 times a day, As Needed  ipratropium-albuterol 0.5 mg-2.5 mg/3 mL inhalation solution: 3 milliliter(s) inhaled every 6 hours  lisinopril-hydrochlorothiazide 20 mg-12.5 mg oral tablet: 1 tab(s) orally once a day  metFORMIN 1000 mg oral tablet: 1 tab(s) orally every 12 hours   metoprolol succinate 100 mg oral capsule, extended release: 1 cap(s) orally once a day    atorvastatin 40 mg oral tablet: 1 tab(s) orally once a day (at bedtime)  budesonide-formoterol 80 mcg-4.5 mcg/inh inhalation aerosol: 2 puff(s) inhaled 2 times a day, As Needed  ipratropium-albuterol 0.5 mg-2.5 mg/3 mL inhalation solution: 3 milliliter(s) inhaled every 6 hours  Januvia 100 mg oral tablet: 1 tab(s) orally once a day   lisinopril-hydrochlorothiazide 20 mg-12.5 mg oral tablet: 1 tab(s) orally once a day  metFORMIN 1000 mg oral tablet: 1 tab(s) orally every 12 hours   metoprolol succinate 100 mg oral capsule, extended release: 1 cap(s) orally once a day

## 2022-06-28 NOTE — DISCHARGE NOTE PROVIDER - PROVIDER TOKENS
· Continue amlodipine 5 mg daily and cardizem at current dose  · Continue lisinopril at current dose  · Follow-up in 5 months PROVIDER:[TOKEN:[[id number]]] PROVIDER:[TOKEN:[[id number]]],PROVIDER:[TOKEN:[[id number]]],PROVIDER:[TOKEN:[[id number]]]

## 2022-06-28 NOTE — DISCHARGE NOTE PROVIDER - HOSPITAL COURSE
67-year-old male with  PMHx of CAD/PVD s/p PCI in 2009 x2 stents (f/u with Dr. Bucio), DM, HLD, HTN, COPD, anxiety, and smoker 50 pack years s/p LLE femoral to above knee popliteal artery bypass with PTFE presents emergency department complaining of generalized weakness for the past 5 days.  Patient states that he feels he has no energy and does not feel like himself.  Also reports shortness of breath, worse with exertion. He also reports decreased p.o intake and decreased appetite.  No known sick contacts.   Denies fever chills pain headache chest pain palpitations abdominal pain nausea vomiting diarrhea dizziness lightheadedness or syncope    In the ED, Vitals were /92, , T 98.3, RR 17, SpO2 98%. Labs were significant for WBC 12K, Hgb 13, MCV 62.8, AST 47, ALT 85, Trop 0.02, . CXR showed cardiomegaly with bilateral opacifications.  Patient received Lasix 40mg x1 and admitted for further management and monitoring.    During hospitalization, patient remained stable, w unremarkable vitals and labswork. Patient ambulation on his own w no needs. Cleared for discharge with instruction to follow up with pcp/cardiologist outpatient and was counselled extensively on medication compliance going forward.    67-year-old male with  PMHx of CAD/PVD s/p PCI in 2009 x2 stents (f/u with Dr. Bucio), DM, HLD, HTN, COPD, anxiety, and smoker 50 pack years s/p LLE femoral to above knee popliteal artery bypass with PTFE presents emergency department complaining of generalized weakness for the past 5 days.  Patient states that he feels he has no energy and does not feel like himself.  Also reports shortness of breath, worse with exertion. He also reports decreased p.o intake and decreased appetite.  No known sick contacts.   Denies fever chills pain headache chest pain palpitations abdominal pain nausea vomiting diarrhea dizziness lightheadedness or syncope    In the ED, Vitals were /92, , T 98.3, RR 17, SpO2 98%. Labs were significant for WBC 12K, Hgb 13, MCV 62.8, AST 47, ALT 85, Trop 0.02, . CXR showed cardiomegaly with bilateral opacifications.  Patient received Lasix 40mg x1 and admitted for further management and monitoring.    During hospitalization, patient remained stable, w unremarkable vitals and labwork, CT scan neg. Patient ambulation on his own w no needs. Cleared for discharge with instruction to follow up with pcp/cardiologist outpatient and was counselled extensively on medication compliance going forward.    67-year-old male with  PMHx of CAD/PVD s/p PCI in 2009 x2 stents (f/u with Dr. Bucio), DM, HLD, HTN, COPD, anxiety, and smoker 50 pack years s/p LLE femoral to above knee popliteal artery bypass with PTFE presents emergency department complaining of generalized weakness for the past 5 days.  Patient states that he feels he has no energy and does not feel like himself.  Also reports shortness of breath, worse with exertion. He also reports decreased p.o intake and decreased appetite.  No known sick contacts.   Denies fever chills pain headache chest pain palpitations abdominal pain nausea vomiting diarrhea dizziness lightheadedness or syncope    In the ED, Vitals were /92, , T 98.3, RR 17, SpO2 98%. Labs were significant for WBC 12K, Hgb 13, MCV 62.8, AST 47, ALT 85, Trop 0.02, . CXR showed cardiomegaly with bilateral opacifications.  Patient received Lasix 40mg x1 and admitted for further management and monitoring.   67-year-old male with  PMHx of CAD/PVD s/p PCI in 2009 x2 stents (f/u with Dr. Bucio), DM, HLD, HTN, COPD, anxiety, and smoker 50 pack years s/p LLE femoral to above knee popliteal artery bypass with PTFE presents emergency department complaining o% on f generalized weakness for the past 5 days.  Patient states that he feels he has no energy and does not feel like himself.  Also reports shortness of breath, worse with exertion. He also reports decreased p.o intake and decreased appetite.  No known sick contacts.   Denies fever chills pain headache chest pain palpitations abdominal pain nausea vomiting diarrhea dizziness lightheadedness or syncope    In the ED, Vitals were /92, , T 98.3, RR 17, SpO2 98%. Labs were significant for WBC 12K, Hgb 13, MCV 62.8, AST 47, ALT 85, Trop 0.02, . CXR showed cardiomegaly with bilateral opacifications.  Patient received Lasix 40mg x1, CT chest on 6/28 was remarkable for pericardial effusion. Pt is s/p pericardialcentesis  and 500cc of fluid was removed. CT surgery states that cytology was sent to the lab for further w/u. Patient als received   EBUS-FNA on July 5th 2022. Patients vitals were monitored while on the floor and has been Hemodynamically stable since arriving. He has also been saturating at 93% on RA. 67-year-old male with  PMHx of CAD/PVD s/p PCI in 2009 x2 stents (f/u with Dr. Bucio), DM, HLD, HTN, COPD, anxiety, and smoker 50 pack years s/p LLE femoral to above knee popliteal artery bypass with PTFE presents emergency department complaining o% on f generalized weakness for the past 5 days.  Patient states that he feels he has no energy and does not feel like himself.  Also reports shortness of breath, worse with exertion. He also reports decreased p.o intake and decreased appetite.  No known sick contacts.   Denies fever chills pain headache chest pain palpitations abdominal pain nausea vomiting diarrhea dizziness lightheadedness or syncope    In the ED, Vitals were /92, , T 98.3, RR 17, SpO2 98%. Labs were significant for WBC 12K, Hgb 13, MCV 62.8, AST 47, ALT 85, Trop 0.02, . CXR showed cardiomegaly with bilateral opacifications.  Patient received Lasix 40mg x1, CT chest on 6/28 was remarkable for pericardial effusion. Pt is s/p pericardialcentesis  and 500cc of fluid was removed. CT surgery states that cytology was sent to the lab for further w/u. Patient als received   EBUS-FNA on July 5th 2022. Patients vitals were monitored while on the floor and has been Hemodynamically stable since arriving. He has also been saturating at 93% on RA. Pt will undergo echocardiogram as outpatient after discharge.

## 2022-06-28 NOTE — DISCHARGE NOTE PROVIDER - NSDCCPCAREPLAN_GEN_ALL_CORE_FT
PRINCIPAL DISCHARGE DIAGNOSIS  Diagnosis: General weakness  Assessment and Plan of Treatment: You presented for generalized weakness and shortness of breath which is likely from medication noncompliance. It is imperative that you take all medications on this form as prescribed daily to optimize your health and prevent future hospitalzations. Please also make sure to follow up with your primary care doctor, as well as your cardiologist for further evaluation and care going forward. COMPLIANCE IS CRUCIAL TO MAXIMIZING YOUR OVERALL HEALTH.      SECONDARY DISCHARGE DIAGNOSES  Diagnosis: Hypertension  Assessment and Plan of Treatment: Please take lisinopril-hydrochlorothiazide as prescribed.    Diagnosis: COPD (chronic obstructive pulmonary disease)  Assessment and Plan of Treatment: Please try to refrain from smoking goinf forward and please use the nebulizer and inhaler on this form as precribed.    Diagnosis: CAD (coronary artery disease)  Assessment and Plan of Treatment: You are being sent home on atorvastatin, aspirin, and metoprolol which must be taken as precribed daily. Please also make sure to follow up with cardiology.    Diagnosis: Hyperlipidemia  Assessment and Plan of Treatment: Your cholesterol is elevated which contributed to your coronary artery disease and stent placement. You were restarted on atorvastatin, along with aspirin which must be continued after discharge.     PRINCIPAL DISCHARGE DIAGNOSIS  Diagnosis: General weakness  Assessment and Plan of Treatment: You presented for generalized weakness and shortness of breath which is likely from medication noncompliance. It is imperative that you take all medications on this form as prescribed daily to optimize your health and prevent future hospitalzations. Please also make sure to follow up with your primary care doctor, as well as your cardiologist for further evaluation and care going forward. COMPLIANCE IS CRUCIAL TO MAXIMIZING YOUR OVERALL HEALTH.      SECONDARY DISCHARGE DIAGNOSES  Diagnosis: Hypertension  Assessment and Plan of Treatment: Please take lisinopril-hydrochlorothiazide as prescribed and follow up with your primary care doctor for bp monitoring going forward.    Diagnosis: COPD (chronic obstructive pulmonary disease)  Assessment and Plan of Treatment: Please try to refrain from smoking goinf forward and please use the nebulizer and inhaler on this form as precribed.    Diagnosis: CAD (coronary artery disease)  Assessment and Plan of Treatment: You are being sent home on atorvastatin, aspirin, and metoprolol which must be taken as precribed daily. Please also make sure to follow up with cardiology.    Diagnosis: Hyperlipidemia  Assessment and Plan of Treatment: Your cholesterol is elevated which contributed to your coronary artery disease and stent placement. You were restarted on atorvastatin, along with aspirin which must be continued after discharge.    Diagnosis: Diabetes mellitus  Assessment and Plan of Treatment: Your diabetes is still uncontrolled - A1c 8.4 - so your metformin was increased to 1000mg twice daily. Please make sure to follow up with your primary care doctor for further evaluation and monitoring     PRINCIPAL DISCHARGE DIAGNOSIS  Diagnosis: General weakness  Assessment and Plan of Treatment: It is imperative that you take all medications on this form as prescribed daily to optimize your health and prevent future hospitalzations. Please also make sure to follow up with your primary care doctor, as well as your cardiologist for further evaluation and care going forward. COMPLIANCE IS CRUCIAL TO MAXIMIZING YOUR OVERALL HEALTH.      SECONDARY DISCHARGE DIAGNOSES  Diagnosis: Hypertension  Assessment and Plan of Treatment: Please take lisinopril-hydrochlorothiazide as prescribed and follow up with your primary care doctor for bp monitoring going forward.    Diagnosis: COPD (chronic obstructive pulmonary disease)  Assessment and Plan of Treatment: Please try to refrain from smoking goinf forward and please use the nebulizer and inhaler on this form as precribed.    Diagnosis: CAD (coronary artery disease)  Assessment and Plan of Treatment: You are being sent home on atorvastatin, aspirin, and metoprolol which must be taken as precribed daily. Please also make sure to follow up with cardiology.    Diagnosis: Hyperlipidemia  Assessment and Plan of Treatment: Your cholesterol is elevated which contributed to your coronary artery disease and stent placement. You were restarted on atorvastatin, along with aspirin which must be continued after discharge.    Diagnosis: Diabetes mellitus  Assessment and Plan of Treatment: Your diabetes is still uncontrolled - A1c 8.4 - so your metformin was increased to 1000mg twice daily. Please make sure to follow up with your primary care doctor for further evaluation and monitoring     PRINCIPAL DISCHARGE DIAGNOSIS  Diagnosis: General weakness  Assessment and Plan of Treatment: YouIt is imperative that you take all medications on this form as prescribed daily to optimize your health and prevent future hospitalzations. Please also make sure to follow up with your primary care doctor, as well as your cardiologist for further evaluation and care going forward. COMPLIANCE IS CRUCIAL TO MAXIMIZING YOUR OVERALL HEALTH.      SECONDARY DISCHARGE DIAGNOSES  Diagnosis: Hypertension  Assessment and Plan of Treatment: Please take lisinopril-hydrochlorothiazide as prescribed and follow up with your primary care doctor for bp monitoring going forward.    Diagnosis: COPD (chronic obstructive pulmonary disease)  Assessment and Plan of Treatment: Please try to refrain from smoking goinf forward and please use the nebulizer and inhaler on this form as precribed.    Diagnosis: CAD (coronary artery disease)  Assessment and Plan of Treatment: You are being sent home on atorvastatin, aspirin, and metoprolol which must be taken as precribed daily. Please also make sure to follow up with cardiology.    Diagnosis: Hyperlipidemia  Assessment and Plan of Treatment: Your cholesterol is elevated which contributed to your coronary artery disease and stent placement. You were restarted on atorvastatin, along with aspirin which must be continued after discharge.    Diagnosis: Diabetes mellitus  Assessment and Plan of Treatment: Your diabetes is still uncontrolled - A1c 8.4 - so your metformin was increased to 1000mg twice daily. Please make sure to follow up with your primary care doctor for further evaluation and monitoring     PRINCIPAL DISCHARGE DIAGNOSIS  Diagnosis: Pericardial effusion  Assessment and Plan of Treatment: During your hospital stay you were found to have a pericardial effusion. During this stay you underwent a procedure called Pericardialcentesis which allowed for removal for the fluid around your heart and an EBUS with Fine needle biopsy to identify source of the opacity on the Chest X-ray. It is important to follow up with your team of doctors including cardiologist, oncology team, and CT surgeon and discuss reccomendations they might have moving forward.      SECONDARY DISCHARGE DIAGNOSES  Diagnosis: Hypertension  Assessment and Plan of Treatment: Please take lisinopril-hydrochlorothiazide as prescribed and follow up with your primary care doctor for bp monitoring going forward.    Diagnosis: COPD (chronic obstructive pulmonary disease)  Assessment and Plan of Treatment: Please try to refrain from smoking goinf forward and please use the nebulizer and inhaler on this form as precribed.    Diagnosis: CAD (coronary artery disease)  Assessment and Plan of Treatment: You are being sent home on atorvastatin, aspirin, and metoprolol which must be taken as precribed daily. Please also make sure to follow up with cardiology.    Diagnosis: Hyperlipidemia  Assessment and Plan of Treatment: Your cholesterol is elevated which contributed to your coronary artery disease and stent placement. You were restarted on atorvastatin, along with aspirin which must be continued after discharge.    Diagnosis: Diabetes mellitus  Assessment and Plan of Treatment: Your diabetes is still uncontrolled - A1c 8.4 - so your metformin was increased to 1000mg twice daily. Please make sure to follow up with your primary care doctor for further evaluation and monitoring     PRINCIPAL DISCHARGE DIAGNOSIS  Diagnosis: General weakness  Assessment and Plan of Treatment: YouIt is imperative that you take all medications on this form as prescribed daily to optimize your health and prevent future hospitalzations. Please also make sure to follow up with your primary care doctor, as well as your cardiologist for further evaluation and care going forward. COMPLIANCE IS CRUCIAL TO MAXIMIZING YOUR OVERALL HEALTH.  Please Follow up with your cardiologist within 1 week of discharge and your PCP within 2 weeks of discharge. You can also follow up with your CT surgeon within 1-2 weeks.      SECONDARY DISCHARGE DIAGNOSES  Diagnosis: Hypertension  Assessment and Plan of Treatment: Please take lisinopril-hydrochlorothiazide as prescribed and follow up with your primary care doctor for bp monitoring going forward.    Diagnosis: COPD (chronic obstructive pulmonary disease)  Assessment and Plan of Treatment: Please try to refrain from smoking goinf forward and please use the nebulizer and inhaler on this form as precribed.    Diagnosis: CAD (coronary artery disease)  Assessment and Plan of Treatment: You are being sent home on atorvastatin, aspirin, and metoprolol which must be taken as precribed daily. Please also make sure to follow up with cardiology.    Diagnosis: Hyperlipidemia  Assessment and Plan of Treatment: Your cholesterol is elevated which contributed to your coronary artery disease and stent placement. You were restarted on atorvastatin, along with aspirin which must be continued after discharge.    Diagnosis: Diabetes mellitus  Assessment and Plan of Treatment: Your diabetes is still uncontrolled - A1c 8.4 - so your metformin was increased to 1000mg twice daily. Please make sure to follow up with your primary care doctor for further evaluation and monitoring     PRINCIPAL DISCHARGE DIAGNOSIS  Diagnosis: Pericardial effusion  Assessment and Plan of Treatment: During your hospital stay you were found to have a pericardial effusion. During this stay you underwent a procedure called Pericardialcentesis which allowed for removal for the fluid around your heart and an EBUS with Fine needle biopsy to identify source of the opacity on the Chest X-ray. It is important to follow up with your team of doctors including cardiologist, oncology team, and CT surgeon and discuss reccomendations they might have moving forward. MAKE SURE TO FOLLOW-UP FOR ECHOCARDIOGRAM AS OUTPATIENT AFTER DISCHARGE.      SECONDARY DISCHARGE DIAGNOSES  Diagnosis: Hypertension  Assessment and Plan of Treatment: Please take lisinopril-hydrochlorothiazide as prescribed and follow up with your primary care doctor for bp monitoring going forward.    Diagnosis: COPD (chronic obstructive pulmonary disease)  Assessment and Plan of Treatment: Please try to refrain from smoking goinf forward and please use the nebulizer and inhaler on this form as precribed.    Diagnosis: CAD (coronary artery disease)  Assessment and Plan of Treatment: You are being sent home on atorvastatin, aspirin, and metoprolol which must be taken as precribed daily. Please also make sure to follow up with cardiology.    Diagnosis: Hyperlipidemia  Assessment and Plan of Treatment: Your cholesterol is elevated which contributed to your coronary artery disease and stent placement. You were restarted on atorvastatin, along with aspirin which must be continued after discharge.    Diagnosis: Diabetes mellitus  Assessment and Plan of Treatment: Your diabetes is still uncontrolled - A1c 8.4 - so your metformin was increased to 1000mg twice daily. Please make sure to follow up with your primary care doctor for further evaluation and monitoring

## 2022-06-28 NOTE — DISCHARGE NOTE PROVIDER - CARE PROVIDER_API CALL
Torsten Reyes)  Cardiology; Interventional Cardiology  [address] [facility], [address]  [place] [address]  Phone: [phone]  Fax: [phone]  Follow Up Time:    Torsten Reyes)  Cardiology; Interventional Cardiology  [address] [facility], [address]  [place] [address]  Phone: [phone]  Fax: [phone]  Follow Up Time:     Yonis Willett)  Surgery; Thoracic and Cardiac Surgery  [address] [place] [address]  Phone: [phone]  Fax: [phone]  Follow Up Time:     Kevin Story)  Critical Care Medicine; Internal Medicine; Pulmonary Disease  [address] [place] [address]  Phone: [phone]  Fax: [phone]  Follow Up Time:

## 2022-06-29 NOTE — CONSULT NOTE ADULT - SUBJECTIVE AND OBJECTIVE BOX
Patient is a 67y old  Male who presents with a chief complaint of weakness (2022 14:51)      HPI: 66 yo M with PMHx of CAD/PVD s/p PCI, DM, HLD, HTN, COPD not on home oxygen, anxiety, PAD s/p LLE femoral to above knee popliteal artery bypass with PTFE presented with  complaint of generalized weakness for the past 5 days associated with shortness of breath exacerbated on exertion.  Denies fever chills pain headache chest pain palpitations abdominal pain nausea vomiting diarrhea dizziness lightheadedness or syncope.    In the ED, Vitals were /92, , T 98.3, RR 17, SpO2 98%.     PAST MEDICAL & SURGICAL HISTORY:  HTN (hypertension)  Diabetes  Dyslipidemia  CAD (coronary artery disease)  History of coronary artery stent placementx 2 stents        SOCIAL HX:   Smoking      50 pack smoking                   ETOH       denies                     Other denies    FAMILY HISTORY:  No pertinent family history in first degree relatives    .  No cardiovascular or pulmonary family history     REVIEW OF SYSTEMS:    All ROS are negative except per HPI       Allergies    No Known Allergies    Intolerances        PHYSICAL EXAM  Vital Signs Last 24 Hrs  T(C): 36.2 (2022 07:19), Max: 36.4 (2022 00:18)  T(F): 97.2 (2022 07:19), Max: 97.5 (2022 00:18)  HR: 91 (2022 07:19) (84 - 94)  BP: 144/64 (2022 07:19) (118/69 - 144/64)  RR: 18 (2022 07:19) (18 - 18)  SpO2: 96% (2022 07:19) (88% - 96%)    CONSTITUTIONAL:  NAD    ENT:   Airway patent,   No thrush    EYES:   Clear bilaterally,   pupils equal,   round and reactive to light.    CARDIAC:   Normal rate,   regular rhythm.    no edema      RESPIRATORY:   No wheezing   Normal chest expansion  Not tachypneic,  No use of accessory muscles    GASTROINTESTINAL:  Abdomen soft, non-tender,   No guarding,   Positive BS    MUSCULOSKELETAL:   Range of motion is not limited,  No clubbing, cyanosis    NEUROLOGICAL:   Alert and oriented   No motor deficits.    SKIN:   Skin normal color for race,         LABS:                          13.1   10.51 )-----------( 240      ( 2022 08:34 )             43.1                                               06-    136  |  91<L>  |  14  ----------------------------<  218<H>  4.7   |  37<H>  |  0.8    Ca    9.7      2022 08:34  Mg     2.0         TPro  7.1  /  Alb  4.2  /  TBili  0.8  /  DBili  x   /  AST  34  /  ALT  65<H>  /  AlkPhos  84                                               Urinalysis Basic - ( 2022 23:42 )    Color: Colorless / Appearance: Clear / S.004 / pH: x  Gluc: x / Ketone: Negative  / Bili: Negative / Urobili: <2 mg/dL   Blood: x / Protein: Negative / Nitrite: Negative   Leuk Esterase: Negative / RBC: x / WBC x   Sq Epi: x / Non Sq Epi: x / Bacteria: x        CARDIAC MARKERS ( 2022 12:28 )  x     / <0.01 ng/mL / x     / x     / x      CARDIAC MARKERS ( 2022 19:09 )  x     / 0.02 ng/mL / x     / x     / x                                                LIVER FUNCTIONS - ( 2022 08:34 )  Alb: 4.2 g/dL / Pro: 7.1 g/dL / ALK PHOS: 84 U/L / ALT: 65 U/L / AST: 34 U/L / GGT: x                                                                                                MEDICATIONS  (STANDING):  albuterol/ipratropium for Nebulization 3 milliLiter(s) Nebulizer every 6 hours  atorvastatin 40 milliGRAM(s) Oral at bedtime  budesonide  80 MICROgram(s)/formoterol 4.5 MICROgram(s) Inhaler 2 Puff(s) Inhalation two times a day  dextrose 5%. 1000 milliLiter(s) (50 mL/Hr) IV Continuous <Continuous>  dextrose 5%. 1000 milliLiter(s) (100 mL/Hr) IV Continuous <Continuous>  dextrose 50% Injectable 25 Gram(s) IV Push once  dextrose 50% Injectable 12.5 Gram(s) IV Push once  dextrose 50% Injectable 25 Gram(s) IV Push once  glucagon  Injectable 1 milliGRAM(s) IntraMuscular once  hydrochlorothiazide 12.5 milliGRAM(s) Oral daily  insulin lispro (ADMELOG) corrective regimen sliding scale   SubCutaneous three times a day before meals  insulin lispro (ADMELOG) corrective regimen sliding scale   SubCutaneous at bedtime  lisinopril 20 milliGRAM(s) Oral daily  melatonin 10 milliGRAM(s) Oral at bedtime  metoprolol tartrate 50 milliGRAM(s) Oral two times a day  predniSONE   Tablet 40 milliGRAM(s) Oral daily    MEDICATIONS  (PRN):  acetaminophen     Tablet .. 650 milliGRAM(s) Oral every 6 hours PRN Temp greater or equal to 38C (100.4F), Mild Pain (1 - 3)  aluminum hydroxide/magnesium hydroxide/simethicone Suspension 30 milliLiter(s) Oral every 4 hours PRN Dyspepsia  dextrose Oral Gel 15 Gram(s) Oral once PRN Blood Glucose LESS THAN 70 milliGRAM(s)/deciliter  ondansetron Injectable 4 milliGRAM(s) IV Push every 8 hours PRN Nausea and/or Vomiting    CT CHEST NC:     LUNGS, PLEURA, AND AIRWAYS: New left upper lobe paramediastinal masslike consolidation, measuring 9.4 x 3.5 cm and additional scattered patchy left upper lobe nodular opacities. Centrilobular emphysema noted. Moderate size left pleural effusion. Left upper lobe interlobular septal thickening. Scattered faint tree-in-bud groundglass nodules most pronounced in the right lower lobe. Few scattered bilateral new solid pulmonary nodules. For example:  -Left lower lobe 6 mm solid nodule (5/161)  -Right lower lobe 3 mm subpleural solid nodule (5/149)    Unchanged 4 mm right upper lobe subpleural solid nodule (5/71).    MEDIASTINUM/LYMPH NODES: Left hilar soft tissue fullness and mediastinal lymphadenopathy. For example, a prevascular lymph node measures 3.6 x 2.4 cm and a subcarinal lymph node measures 2.8 x 1.7 cm. Enlarged 1.8 x 1.3 cm right supraclavicular lymph node.    HEART/GREAT VESSELS: Normal heart size. Large pericardial effusion measuring up to 2.8 cm in thickness. Coronary artery and aortic calcifications noted.    IMPRESSION:  1. Left upper lobe masslike consolidation and additional patchy left lung opacities with bulky intrathoracic and right supraclavicular lymphadenopathy. Findings are suspicious for a neoplastic process. Further evaluation with tissue sampling and/or PET/CT is recommended.  2. Additional scattered bilateral pulmonary nodules, possibly infectious/inflammatory or neoplastic.  3. Moderate size left pleural effusion.  4. Large pericardial effusion.    
GENERAL SURGERY CONSULT NOTE    Patient: PORFIRIO HERRERA , 67y (53)Male   MRN: [id number]  Location: [facility] ER Hold 017 A  Visit: 22 Inpatient  Date: 22 @ 19:11    HPI:  67-year-old male with  PMHx of CAD/PVD s/p PCI in 2009 x2 stents (f/u with Dr. Bucio), DM, HLD, HTN, COPD, anxiety, and smoker 50 pack years s/p LLE femoral to above knee popliteal artery bypass with PTFE presents emergency department complaining of generalized weakness for the past 5 days.  Patient states that he feels he has no energy and does not feel like himself.  Also reports shortness of breath, worse with exertion. He also reports decreased p.o intake and decreased appetite.  No known sick contacts.   Denies fever chills pain headache chest pain palpitations abdominal pain nausea vomiting diarrhea dizziness lightheadedness or syncope    In the ED, Vitals were /92, , T 98.3, RR 17, SpO2 98%. Labs were significant for WBC 12K, Hgb 13, MCV 62.8, AST 47, ALT 85, Trop 0.02, . Patient received Lasix 40mg x1     Patient is being admitted for further management and monitoring    (2022 23:04)    Surgery consulted for large pericardial effusion on CT scan. Pt has new PARISH mass needing tissue sampling. Pulm consulted for EBUS/FNA, pulm requesting drainage of fluid    PAST MEDICAL & SURGICAL HISTORY:  HTN (hypertension)  Diabetes  Dyslipidemia  CAD (coronary artery disease)  History of coronary artery stent placement  x 2 stents    VITALS:  T(F): 97.7 (22 @ 15:52), Max: 97.7 (22 @ 15:52)  HR: 82 (22 @ 17:13) (82 - 110)  BP: 141/73 (22 @ 17:13) (118/69 - 144/64)  RR: 20 (22 @ 17:13) (18 - 20)  SpO2: 92% (22 @ 15:52) (88% - 96%)    PHYSICAL EXAM:  General Appearance: NAD  HEENT: Normocephalic, atraumatic, trachea midline. No JVD  Heart: s1, s2  Lungs: No increased work of breathing or accessory muscle use. Symmetric chest wall rise and fall. Bilateral breath sounds  Abdomen:  Soft, nontender, nondistended. No rebound or guarding.  MSK/Extremities: Warm & well-perfused.   Skin: Warm, dry. No jaundice.     MEDICATIONS  (STANDING):  albuterol/ipratropium for Nebulization 3 milliLiter(s) Nebulizer every 6 hours  atorvastatin 40 milliGRAM(s) Oral at bedtime  budesonide 160 MICROgram(s)/formoterol 4.5 MICROgram(s) Inhaler 2 Puff(s) Inhalation two times a day  dextrose 5%. 1000 milliLiter(s) (50 mL/Hr) IV Continuous <Continuous>  dextrose 5%. 1000 milliLiter(s) (100 mL/Hr) IV Continuous <Continuous>  dextrose 50% Injectable 25 Gram(s) IV Push once  dextrose 50% Injectable 12.5 Gram(s) IV Push once  dextrose 50% Injectable 25 Gram(s) IV Push once  diphenhydrAMINE Injectable 50 milliGRAM(s) IV Push <User Schedule>  glucagon  Injectable 1 milliGRAM(s) IntraMuscular once  hydrochlorothiazide 12.5 milliGRAM(s) Oral daily  insulin lispro (ADMELOG) corrective regimen sliding scale   SubCutaneous three times a day before meals  insulin lispro Injectable (ADMELOG) 5 Unit(s) SubCutaneous three times a day before meals  lisinopril 20 milliGRAM(s) Oral daily  melatonin 10 milliGRAM(s) Oral at bedtime  metoprolol tartrate 50 milliGRAM(s) Oral two times a day  tiotropium 18 MICROgram(s) Capsule 1 Capsule(s) Inhalation daily    MEDICATIONS  (PRN):  acetaminophen     Tablet .. 650 milliGRAM(s) Oral every 6 hours PRN Temp greater or equal to 38C (100.4F), Mild Pain (1 - 3)  aluminum hydroxide/magnesium hydroxide/simethicone Suspension 30 milliLiter(s) Oral every 4 hours PRN Dyspepsia  dextrose Oral Gel 15 Gram(s) Oral once PRN Blood Glucose LESS THAN 70 milliGRAM(s)/deciliter  ondansetron Injectable 4 milliGRAM(s) IV Push every 8 hours PRN Nausea and/or Vomiting      LAB/STUDIES:                     13.1   10.51 )-----------( 240      ( 2022 08:34 )             43.1       136  |  91<L>  |  14  ----------------------------<  218<H>  4.7   |  37<H>  |  0.8    Ca    9.7      2022 08:34  Mg     2.0         TPro  7.1  /  Alb  4.2  /  TBili  0.8  /  DBili  x   /  AST  34  /  ALT  65<H>  /  AlkPhos  84      LIVER FUNCTIONS - ( 2022 08:34 )  Alb: 4.2 g/dL / Pro: 7.1 g/dL / ALK PHOS: 84 U/L / ALT: 65 U/L / AST: 34 U/L / GGT: x           Urinalysis Basic - ( 2022 23:42 )    Color: Colorless / Appearance: Clear / S.004 / pH: x  Gluc: x / Ketone: Negative  / Bili: Negative / Urobili: <2 mg/dL   Blood: x / Protein: Negative / Nitrite: Negative   Leuk Esterase: Negative / RBC: x / WBC x   Sq Epi: x / Non Sq Epi: x / Bacteria: x    CARDIAC MARKERS ( 2022 12:28 )  x     / <0.01 ng/mL / x     / x     / x          IMAGING:  < from: CT Chest No Cont (22 @ 08:16) >  IMPRESSION:  1.  Left upper lobe masslike consolidation and additional patchy left   lung opacities with bulky intrathoracic and right supraclavicular   lymphadenopathy. Findings are suspicious for a neoplastic process.   Further evaluation with tissue sampling and/or PET/CT is recommended.  2.  Additional scattered bilateral pulmonary nodules, possibly   infectious/inflammatory or neoplastic.  3.  Moderate size left pleural effusion.  4.  Large pericardial effusion.  < end of copied text >

## 2022-06-29 NOTE — PROGRESS NOTE ADULT - SUBJECTIVE AND OBJECTIVE BOX
PORFIRIO HERRERA  67y, Male  Allergy: No Known Allergies    Hospital Day: 2d    Patient seen and examined. No acute events overnight    PMH/PSH:  PAST MEDICAL & SURGICAL HISTORY:  HTN (hypertension)      Diabetes      Dyslipidemia      CAD (coronary artery disease)      History of coronary artery stent placement  x 2 stents    VITALS:  T(F): 97.2 (22 @ 07:19), Max: 97.5 (22 @ 00:18)  HR: 91 (22 @ 07:19)  BP: 144/64 (22 @ 07:19) (118/69 - 144/64)  RR: 18 (22 @ 07:19)  SpO2: 96% (22 @ 07:19)    TESTS & MEASUREMENTS:  Weight (Kg):                         13.1   10.51 )-----------( 240      ( 2022 08:34 )             43.1         136  |  91<L>  |  14  ----------------------------<  218<H>  4.7   |  37<H>  |  0.8    Ca    9.7      2022 08:34  Mg     2.0         TPro  7.1  /  Alb  4.2  /  TBili  0.8  /  DBili  x   /  AST  34  /  ALT  65<H>  /  AlkPhos  84      LIVER FUNCTIONS - ( 2022 08:34 )  Alb: 4.2 g/dL / Pro: 7.1 g/dL / ALK PHOS: 84 U/L / ALT: 65 U/L / AST: 34 U/L / GGT: x           CARDIAC MARKERS ( 2022 12:28 )  x     / <0.01 ng/mL / x     / x     / x      CARDIAC MARKERS ( 2022 19:09 )  x     / 0.02 ng/mL / x     / x     / x        Urinalysis Basic - ( 2022 23:42 )    Color: Colorless / Appearance: Clear / S.004 / pH: x  Gluc: x / Ketone: Negative  / Bili: Negative / Urobili: <2 mg/dL   Blood: x / Protein: Negative / Nitrite: Negative   Leuk Esterase: Negative / RBC: x / WBC x   Sq Epi: x / Non Sq Epi: x / Bacteria: x    MEDICATIONS:  MEDICATIONS  (STANDING):  albuterol/ipratropium for Nebulization 3 milliLiter(s) Nebulizer every 6 hours  atorvastatin 40 milliGRAM(s) Oral at bedtime  budesonide  80 MICROgram(s)/formoterol 4.5 MICROgram(s) Inhaler 2 Puff(s) Inhalation two times a day  dextrose 5%. 1000 milliLiter(s) (50 mL/Hr) IV Continuous <Continuous>  dextrose 5%. 1000 milliLiter(s) (100 mL/Hr) IV Continuous <Continuous>  dextrose 50% Injectable 25 Gram(s) IV Push once  dextrose 50% Injectable 12.5 Gram(s) IV Push once  dextrose 50% Injectable 25 Gram(s) IV Push once  enoxaparin Injectable 40 milliGRAM(s) SubCutaneous every 24 hours  glucagon  Injectable 1 milliGRAM(s) IntraMuscular once  hydrochlorothiazide 12.5 milliGRAM(s) Oral daily  insulin lispro (ADMELOG) corrective regimen sliding scale   SubCutaneous three times a day before meals  insulin lispro (ADMELOG) corrective regimen sliding scale   SubCutaneous at bedtime  lisinopril 20 milliGRAM(s) Oral daily  melatonin 10 milliGRAM(s) Oral at bedtime  metoprolol tartrate 50 milliGRAM(s) Oral two times a day  predniSONE   Tablet 40 milliGRAM(s) Oral daily    MEDICATIONS  (PRN):  acetaminophen     Tablet .. 650 milliGRAM(s) Oral every 6 hours PRN Temp greater or equal to 38C (100.4F), Mild Pain (1 - 3)  aluminum hydroxide/magnesium hydroxide/simethicone Suspension 30 milliLiter(s) Oral every 4 hours PRN Dyspepsia  dextrose Oral Gel 15 Gram(s) Oral once PRN Blood Glucose LESS THAN 70 milliGRAM(s)/deciliter  ondansetron Injectable 4 milliGRAM(s) IV Push every 8 hours PRN Nausea and/or Vomiting    HOME MEDICATIONS:    REVIEW OF SYSTEMS:  All other review of systems is negative unless indicated above.     PHYSICAL EXAM:  PHYSICAL EXAM:  GENERAL: NAD, well-developed  HEAD:  Atraumatic, Normocephalic  NECK: Supple, No JVD  CHEST/LUNG: Clear to auscultation bilaterally; No wheeze  HEART: Regular rate and rhythm; No murmurs, rubs, or gallops  ABDOMEN: Soft, Nontender, Nondistended; Bowel sounds present  EXTREMITIES:  2+ Peripheral Pulses, No clubbing, cyanosis, or edema  SKIN: No rashes or lesions

## 2022-06-29 NOTE — PROGRESS NOTE ADULT - ASSESSMENT
67-year-old male with  PMHx of CAD/PVD s/p PCI in 2009 x2 stents (f/u with Dr. Bucio), DM, HLD, HTN, COPD, anxiety, and smoker 50 pack years s/p LLE femoral to above knee popliteal artery bypass with PTFE presents emergency department complaining of generalized weakness for the past 5 days.      #Generalized Weakness, dyspnea  #Possible malignant nodules on CT Chest  Episode of desaturation 88% on RA yesterday  CRP elevated  - CT Chest 06/28: Possible findings of malignancy noted by radiology (awaiting final read)  - Pulm eval for biopsy, either by bronch or if IR needed    #Microcytic Anemia  Ferritin high, possible AoCD  - monitor CBC     #Mild COPD exacerbation  - Duonebs q6h  - Prednisone PO 40mg qD x 5 days    #HTN (hypertension)  *home meds : lisinopril/ HCTZ 20mg - 12.5mg   - c/w lisinopril 20mg and HCTZ 12.5mg daily  - Monitor BP     #Diabetes  *home meds: 850mg metformin  - Monitor FS (gOAL 140-180)  - ISS  - Check A1C     #CAD x 2 stents  #HTN/HLD  - Cont metoprolol 50 BID  - Cont HCTZ 12.5mg qD  - Cont atorvastatin 40mg qHs  - Cont lisinopril 20mg qD    #Misc  - DVT Prophylaxis: SCD in case biopsy is going to be done    #Progress Note Handoff  Pending (specify): pulm eval for eval for malignant nodules  Family discussion: d/w pt regarding CT Chest findings  Disposition: Home   67-year-old male with  PMHx of CAD/PVD s/p PCI in 2009 x2 stents (f/u with Dr. Bucio), DM, HLD, HTN, COPD, anxiety, and smoker 50 pack years s/p LLE femoral to above knee popliteal artery bypass with PTFE presents emergency department complaining of generalized weakness for the past 5 days.      #Generalized Weakness, dyspnea  #Possible malignant nodules on CT Chest  Episode of desaturation 88% on RA yesterday  CRP elevated  CT Chest 06/28: PARISH masslike consolidation and patchy left lung opacities with bulky intrathoracic and right supraclavicular lymphadenopathy Suspicious of neoplastic process. Moderate left pleural effusion. large pericardial effusion  - Pulm eval for thoracentesis and/or biopsy, either by bronch or if IR needed  - Obtain echocardiogram   - CT surgery eval    #Microcytic Anemia  Ferritin high, possible AoCD  - monitor CBC     #Mild COPD exacerbation  - Duonebs q6h  - Prednisone PO 40mg qD x 5 days    #HTN (hypertension)  *home meds : lisinopril/ HCTZ 20mg - 12.5mg   - c/w lisinopril 20mg and HCTZ 12.5mg daily  - Monitor BP     #Diabetes  *home meds: 850mg metformin  - Monitor FS (gOAL 140-180)  - ISS  - Check A1C     #CAD x 2 stents  #HTN/HLD  - Cont metoprolol 50 BID  - Cont HCTZ 12.5mg qD  - Cont atorvastatin 40mg qHs  - Cont lisinopril 20mg qD    #Misc  - DVT Prophylaxis: SCD in case biopsy is going to be done    #Progress Note Handoff  Pending (specify): pulm eval for eval for malignant nodules  Family discussion: d/w pt regarding CT Chest findings  Disposition: Home

## 2022-06-29 NOTE — CONSULT NOTE ADULT - ATTENDING COMMENTS
IMPRESSION:    PARISH Masslike Consolidation - likely malignant  Bilateral Solid Nodules  Mediastinal Lymphadenopathy  Left Pleural Effusion  Large Pericardial Effusion  HO Moderate COPD    Plan as above.

## 2022-06-29 NOTE — CONSULT NOTE ADULT - ASSESSMENT
IMPRESSION:    PARISH Masslike Consolidation   Bilateral Solid Nodules  Mediastinal Lymphadenopathy  Left Pleural Effusion  Large Pericardial Effusion  HO Moderate COPD    PLAN:    CT Chest reviewed, findings suspicious for underlying malignancy   CT Surgery for evaluation of pericardicentesis  Check 2D-Echo   CRP 36.8, ESR 18  IMPRESSION:    PARISH Masslike Consolidation - likely malignant  Bilateral Solid Nodules  Mediastinal Lymphadenopathy  Left Pleural Effusion  Large Pericardial Effusion  HO Moderate COPD    PLAN:    CT Chest reviewed, findings suspicious for underlying malignancy   Large mediastinal adenopathy and left hilar mass - needs bronch EBUS-FNA to determine diagnosis  CT Surgery for evaluation of pericardicentesis - send fluid for cytology  Keep O2 saturation more than 88%  Continue symbicort BID, add spiriva for COPD  Wean off Prednisone over 10 days  Check 2D-Echo   Recommend pharmacological DVT ppx  He will need outpatient pulm follow up for PFTs, 6MWT  We will follow  IMPRESSION:    PARISH Masslike Consolidation - likely malignant  Bilateral Solid Nodules  Mediastinal Lymphadenopathy  Left Pleural Effusion  Large Pericardial Effusion  Heavy smoker - COPD     PLAN:    CT Chest reviewed, findings suspicious for underlying malignancy   Large mediastinal adenopathy and left hilar mass - needs bronch EBUS-FNA to determine diagnosis when stable   CT Surgery for evaluation of pericardicentesis - send fluid for cytology  Keep O2 saturation more than 88%  Continue symbicort BID, add spiriva for COPD  Switch to IV Solumedrol for wheezing: Solumedrol 40mg IV every 8 hours for now  Check 2D-Echo; hemodynamically stable now  Recommend pharmacological DVT ppx; early ambulation  He will need outpatient pulm follow up for PFTs, 6MWT  We will follow

## 2022-06-29 NOTE — CONSULT NOTE ADULT - ASSESSMENT
67M PMHx of CAD/PVD s/p PCI in 2009 x2 stents (f/u with Dr. Bucio), DM, HLD, HTN, COPD, anxiety, and smoker 50 pack years s/p LLE femoral to above knee popliteal artery bypass with PTFE presents emergency department complaining of generalized weakness for the past 5 days.  Patient reports shortness of breath, worse with exertion. He also reports decreased p.o intake and decreased appetite.  No known sick contacts.  CT Chest done, showing large pericardial effusion. Physical exam findings, imaging, and labs as documented above.     PLAN:  - large pericardial effusion will likely need drainage, need ECHO, reordered as STAT  - would plan for OR tentatively Friday 7/1  - pt is aware of plan, spoke at length with him and wife/significant other at bedside    Above plan discussed with Attending Surgeon Dr. Jim Kiran, patient, patient family, and Primary team  06-29-22 @ 19:11    #[id number]

## 2022-06-30 NOTE — CHART NOTE - NSCHARTNOTEFT_GEN_A_CORE
67M PMHx of CAD/PVD s/p PCI in 2009 x2 stents (f/u with Dr. Bucio), DM, HLD, HTN, COPD, anxiety, and smoker 50 pack years s/p LLE femoral to above knee popliteal artery bypass with PTFE presents emergency department complaining of generalized weakness for the past 5 days.  Patient reports shortness of breath, worse with exertion. He also reports decreased p.o intake and decreased appetite.  No known sick contacts.  CT Chest done, showing large pericardial effusion. Physical exam findings, imaging, and labs as documented above.     PLAN:  - large pericardial effusion will likely need drainage, need ECHO, reordered as STAT  - would plan for OR tentatively Friday 7/1  - pt is aware of plan, spoke at length with him and wife/significant other at bedside 67M PMHx of CAD/PVD s/p PCI in 2009 x2 stents (f/u with Dr. Bucio), DM, HLD, HTN, COPD, anxiety, and smoker 50 pack years s/p LLE femoral to above knee popliteal artery bypass with PTFE presents emergency department complaining of generalized weakness for the past 5 days.  Patient reports shortness of breath, worse with exertion. He also reports decreased p.o intake and decreased appetite.  No known sick contacts.  CT Chest done, showing large pericardial effusion. Physical exam findings, imaging, and labs as documented above.     PLAN:  - care by primary team  - Add on x OR  Friday 7/1  - pre-op today  - will obtain consent  - discussed with primary team    spectra [id number]

## 2022-06-30 NOTE — PROGRESS NOTE ADULT - SUBJECTIVE AND OBJECTIVE BOX
INTERVAL HPI/OVERNIGHT EVENTS: Pt states that he is anxious because he was recently seen by many doctors with talks about different procedures and potentially surgery.     SUBJECTIVE: Patient seen and examined at bedside.     CONSTITUTIONAL: No weakness, fevers or chills  EYES/ENT: No visual changes;  No vertigo or throat pain   NECK: No pain or stiffness  RESPIRATORY: No cough, wheezing, hemoptysis; No shortness of breath  CARDIOVASCULAR: No chest pain or palpitations  GASTROINTESTINAL: No abdominal or epigastric pain. No nausea, vomiting, or hematemesis; No diarrhea or constipation. No melena or hematochezia.  GENITOURINARY: No dysuria, frequency or hematuria  NEUROLOGICAL: No numbness or weakness  SKIN: No itching, rashes    OBJECTIVE:    VITAL SIGNS:  ICU Vital Signs Last 24 Hrs  T(C): 36.1 (30 Jun 2022 05:00), Max: 36.5 (29 Jun 2022 15:52)  T(F): 97 (30 Jun 2022 05:00), Max: 97.7 (29 Jun 2022 15:52)  HR: 81 (30 Jun 2022 05:00) (81 - 110)  BP: 130/67 (30 Jun 2022 05:00) (122/56 - 149/75)  BP(mean): --  ABP: --  ABP(mean): --  RR: 18 (30 Jun 2022 05:00) (18 - 20)  SpO2: 96% (30 Jun 2022 05:00) (91% - 96%)        CAPILLARY BLOOD GLUCOSE      POCT Blood Glucose.: 277 mg/dL (30 Jun 2022 11:25)      PHYSICAL EXAM:    General: NAD  HEENT: NC/AT; PERRL, clear conjunctiva  Neck: supple  Respiratory: CTA b/l  Cardiovascular: +S1/S2; RRR  Abdomen: soft, NT/ND; +BS x4  Extremities: WWP, 2+ peripheral pulses b/l; no LE edema  Skin: normal color and turgor; no rash  Neurological:    MEDICATIONS:  MEDICATIONS  (STANDING):  albuterol/ipratropium for Nebulization 3 milliLiter(s) Nebulizer every 6 hours  atorvastatin 40 milliGRAM(s) Oral at bedtime  budesonide 160 MICROgram(s)/formoterol 4.5 MICROgram(s) Inhaler 2 Puff(s) Inhalation two times a day  dextrose 5%. 1000 milliLiter(s) (50 mL/Hr) IV Continuous <Continuous>  dextrose 5%. 1000 milliLiter(s) (100 mL/Hr) IV Continuous <Continuous>  dextrose 50% Injectable 25 Gram(s) IV Push once  dextrose 50% Injectable 12.5 Gram(s) IV Push once  dextrose 50% Injectable 25 Gram(s) IV Push once  glucagon  Injectable 1 milliGRAM(s) IntraMuscular once  hydrochlorothiazide 12.5 milliGRAM(s) Oral daily  insulin lispro (ADMELOG) corrective regimen sliding scale   SubCutaneous three times a day before meals  insulin lispro Injectable (ADMELOG) 5 Unit(s) SubCutaneous three times a day before meals  lisinopril 20 milliGRAM(s) Oral daily  melatonin 10 milliGRAM(s) Oral at bedtime  methylPREDNISolone sodium succinate Injectable 40 milliGRAM(s) IV Push daily  metoprolol tartrate 50 milliGRAM(s) Oral two times a day  tiotropium 18 MICROgram(s) Capsule 1 Capsule(s) Inhalation daily    MEDICATIONS  (PRN):  acetaminophen     Tablet .. 650 milliGRAM(s) Oral every 6 hours PRN Temp greater or equal to 38C (100.4F), Mild Pain (1 - 3)  aluminum hydroxide/magnesium hydroxide/simethicone Suspension 30 milliLiter(s) Oral every 4 hours PRN Dyspepsia  dextrose Oral Gel 15 Gram(s) Oral once PRN Blood Glucose LESS THAN 70 milliGRAM(s)/deciliter  ondansetron Injectable 4 milliGRAM(s) IV Push every 8 hours PRN Nausea and/or Vomiting      ALLERGIES:  Allergies    No Known Allergies    Intolerances        LABS:                        13.1   10.51 )-----------( 240      ( 29 Jun 2022 08:34 )             43.1     06-29    136  |  91<L>  |  14  ----------------------------<  218<H>  4.7   |  37<H>  |  0.8    Ca    9.7      29 Jun 2022 08:34  Mg     2.0     06-29    TPro  7.1  /  Alb  4.2  /  TBili  0.8  /  DBili  x   /  AST  34  /  ALT  65<H>  /  AlkPhos  84  06-29          RADIOLOGY & ADDITIONAL TESTS: Reviewed. INTERVAL HPI/OVERNIGHT EVENTS: Pt states that he is anxious because he was recently seen by many doctors with talks about different procedures and potentially surgery.     SUBJECTIVE: Patient seen and examined at bedside.       OBJECTIVE:    VITAL SIGNS:  ICU Vital Signs Last 24 Hrs  T(C): 36.1 (30 Jun 2022 05:00), Max: 36.5 (29 Jun 2022 15:52)  T(F): 97 (30 Jun 2022 05:00), Max: 97.7 (29 Jun 2022 15:52)  HR: 81 (30 Jun 2022 05:00) (81 - 110)  BP: 130/67 (30 Jun 2022 05:00) (122/56 - 149/75)  BP(mean): --  ABP: --  ABP(mean): --  RR: 18 (30 Jun 2022 05:00) (18 - 20)  SpO2: 96% (30 Jun 2022 05:00) (91% - 96%)        CAPILLARY BLOOD GLUCOSE      POCT Blood Glucose.: 277 mg/dL (30 Jun 2022 11:25)      PHYSICAL EXAM:    General: NAD  HEENT: NC/AT; PERRL, clear conjunctiva  Neck: supple, no JVD appreciated.  Respiratory: CTA b/l  Cardiovascular: distant heart sounds, RRR, mildly decreased pulse pressure on inspirations  Abdomen: soft, NT/ND; +BS x4  Extremities: WWP, 2+ peripheral pulses b/l; no LE edema  Skin: normal color and turgor; no rash  Neurological:    MEDICATIONS:  MEDICATIONS  (STANDING):  albuterol/ipratropium for Nebulization 3 milliLiter(s) Nebulizer every 6 hours  atorvastatin 40 milliGRAM(s) Oral at bedtime  budesonide 160 MICROgram(s)/formoterol 4.5 MICROgram(s) Inhaler 2 Puff(s) Inhalation two times a day  dextrose 5%. 1000 milliLiter(s) (50 mL/Hr) IV Continuous <Continuous>  dextrose 5%. 1000 milliLiter(s) (100 mL/Hr) IV Continuous <Continuous>  dextrose 50% Injectable 25 Gram(s) IV Push once  dextrose 50% Injectable 12.5 Gram(s) IV Push once  dextrose 50% Injectable 25 Gram(s) IV Push once  glucagon  Injectable 1 milliGRAM(s) IntraMuscular once  hydrochlorothiazide 12.5 milliGRAM(s) Oral daily  insulin lispro (ADMELOG) corrective regimen sliding scale   SubCutaneous three times a day before meals  insulin lispro Injectable (ADMELOG) 5 Unit(s) SubCutaneous three times a day before meals  lisinopril 20 milliGRAM(s) Oral daily  melatonin 10 milliGRAM(s) Oral at bedtime  methylPREDNISolone sodium succinate Injectable 40 milliGRAM(s) IV Push daily  metoprolol tartrate 50 milliGRAM(s) Oral two times a day  tiotropium 18 MICROgram(s) Capsule 1 Capsule(s) Inhalation daily    MEDICATIONS  (PRN):  acetaminophen     Tablet .. 650 milliGRAM(s) Oral every 6 hours PRN Temp greater or equal to 38C (100.4F), Mild Pain (1 - 3)  aluminum hydroxide/magnesium hydroxide/simethicone Suspension 30 milliLiter(s) Oral every 4 hours PRN Dyspepsia  dextrose Oral Gel 15 Gram(s) Oral once PRN Blood Glucose LESS THAN 70 milliGRAM(s)/deciliter  ondansetron Injectable 4 milliGRAM(s) IV Push every 8 hours PRN Nausea and/or Vomiting      ALLERGIES:  Allergies    No Known Allergies    Intolerances        LABS:                        13.1   10.51 )-----------( 240      ( 29 Jun 2022 08:34 )             43.1     06-29    136  |  91<L>  |  14  ----------------------------<  218<H>  4.7   |  37<H>  |  0.8    Ca    9.7      29 Jun 2022 08:34  Mg     2.0     06-29    TPro  7.1  /  Alb  4.2  /  TBili  0.8  /  DBili  x   /  AST  34  /  ALT  65<H>  /  AlkPhos  84  06-29          RADIOLOGY & ADDITIONAL TESTS: Reviewed. INTERVAL HPI/OVERNIGHT EVENTS: Pt states that he is anxious because he was recently seen by many doctors with talks about different procedures and potentially surgery. Denies any headaches, cough, fevers, chills, N/V/D/C, states that he is able to have BM and urinate normally, denies any chest pain or palpitations.       OBJECTIVE:    VITAL SIGNS:  ICU Vital Signs Last 24 Hrs  T(C): 36.1 (30 Jun 2022 05:00), Max: 36.5 (29 Jun 2022 15:52)  T(F): 97 (30 Jun 2022 05:00), Max: 97.7 (29 Jun 2022 15:52)  HR: 81 (30 Jun 2022 05:00) (81 - 110)  BP: 130/67 (30 Jun 2022 05:00) (122/56 - 149/75)  BP(mean): --  ABP: --  ABP(mean): --  RR: 18 (30 Jun 2022 05:00) (18 - 20)  SpO2: 96% (30 Jun 2022 05:00) (91% - 96%)        CAPILLARY BLOOD GLUCOSE      POCT Blood Glucose.: 277 mg/dL (30 Jun 2022 11:25)      PHYSICAL EXAM:    General: NAD  HEENT: NC/AT; PERRL, clear conjunctiva  Neck: supple, no JVD appreciated.  Respiratory: CTA b/l  Cardiovascular: distant heart sounds, RRR, mildly decreased pulse pressure on inspirations  Abdomen: soft, NT/ND; +BS x4  Extremities: WWP, 2+ peripheral pulses b/l; no LE edema  Skin: normal color and turgor; no rash      MEDICATIONS:  MEDICATIONS  (STANDING):  albuterol/ipratropium for Nebulization 3 milliLiter(s) Nebulizer every 6 hours  atorvastatin 40 milliGRAM(s) Oral at bedtime  budesonide 160 MICROgram(s)/formoterol 4.5 MICROgram(s) Inhaler 2 Puff(s) Inhalation two times a day  dextrose 5%. 1000 milliLiter(s) (50 mL/Hr) IV Continuous <Continuous>  dextrose 5%. 1000 milliLiter(s) (100 mL/Hr) IV Continuous <Continuous>  dextrose 50% Injectable 25 Gram(s) IV Push once  dextrose 50% Injectable 12.5 Gram(s) IV Push once  dextrose 50% Injectable 25 Gram(s) IV Push once  glucagon  Injectable 1 milliGRAM(s) IntraMuscular once  hydrochlorothiazide 12.5 milliGRAM(s) Oral daily  insulin lispro (ADMELOG) corrective regimen sliding scale   SubCutaneous three times a day before meals  insulin lispro Injectable (ADMELOG) 5 Unit(s) SubCutaneous three times a day before meals  lisinopril 20 milliGRAM(s) Oral daily  melatonin 10 milliGRAM(s) Oral at bedtime  methylPREDNISolone sodium succinate Injectable 40 milliGRAM(s) IV Push daily  metoprolol tartrate 50 milliGRAM(s) Oral two times a day  tiotropium 18 MICROgram(s) Capsule 1 Capsule(s) Inhalation daily    MEDICATIONS  (PRN):  acetaminophen     Tablet .. 650 milliGRAM(s) Oral every 6 hours PRN Temp greater or equal to 38C (100.4F), Mild Pain (1 - 3)  aluminum hydroxide/magnesium hydroxide/simethicone Suspension 30 milliLiter(s) Oral every 4 hours PRN Dyspepsia  dextrose Oral Gel 15 Gram(s) Oral once PRN Blood Glucose LESS THAN 70 milliGRAM(s)/deciliter  ondansetron Injectable 4 milliGRAM(s) IV Push every 8 hours PRN Nausea and/or Vomiting      ALLERGIES:  Allergies    No Known Allergies    Intolerances        LABS:                        13.1   10.51 )-----------( 240      ( 29 Jun 2022 08:34 )             43.1     06-29    136  |  91<L>  |  14  ----------------------------<  218<H>  4.7   |  37<H>  |  0.8    Ca    9.7      29 Jun 2022 08:34  Mg     2.0     06-29    TPro  7.1  /  Alb  4.2  /  TBili  0.8  /  DBili  x   /  AST  34  /  ALT  65<H>  /  AlkPhos  84  06-29          RADIOLOGY & ADDITIONAL TESTS: Reviewed.

## 2022-06-30 NOTE — PATIENT PROFILE ADULT - FALL HARM RISK - HARM RISK INTERVENTIONS

## 2022-06-30 NOTE — PROGRESS NOTE ADULT - ASSESSMENT
IMPRESSION:    PARISH Masslike Consolidation - likely malignant  Bilateral Solid Nodules  Mediastinal Lymphadenopathy  Left Pleural Effusion  Large Pericardial Effusion  Heavy smoker - COPD     PLAN:    CT Chest reviewed, findings suspicious for underlying malignancy   Large mediastinal adenopathy and left hilar mass - needs bronch EBUS-FNA to obtain tissue. Scheduled for Tuesday.   CT Surgery for evaluation of pericardicentesis - planned for tomorrow - fluid will be sent for cytology.   Keep O2 saturation more than 88%  Continue symbicort BID, add spiriva for COPD  Continue with IV solumedrol for now; switch to PO prednisone if continues to improved  Check 2D-Echo; hemodynamically stable; CTS following  Recommend pharmacological DVT ppx; early ambulation  He will need outpatient pulm follow up for PFTs, 6MWT  We will follow

## 2022-06-30 NOTE — PROGRESS NOTE ADULT - ASSESSMENT
67-year-old male with  PMHx of CAD/PVD s/p PCI in 2009 x2 stents (f/u with Dr. Bucio), DM, HLD, HTN, COPD, anxiety, and smoker 50 pack years s/p LLE femoral to above knee popliteal artery bypass with PTFE presents emergency department complaining of generalized weakness and shortness of breath for the past 5 days.       #Generalized Weakness  - Afebrile, WBC 12K   - CXR- left opacity (f/u offical read)  - No signs fluid overload on PE  - F/U UA, UCx,  TSH   - obtain PT/OT     #Microcytic Anemia  - check iron studies and ferritin   - monitor CBC     #COPD  * expiratory wheeze on exam , denies any productive cough but endorses worsening sob  - start Duonebs and symbicort   - start prednisone 40mg daily     #HTN (hypertension)  *home meds : lisinopril/ HCTZ 20mg - 12.5mg   - c/w lisinopril 20mg and HCTZ 12.5mg daily  - Monitor BP     #Diabetes  *home meds: 850mg metformin  - Monitor FS (gOAL 140-180)  - Start ISS  - Check A1C     #Dyslipidemia  *patient is not taking stain per pharmacy   - Start atorvastatin 40mg daily   - check lipid profile    #CAD x 2 stents  *patient was previously on metoprolol 50mg BID but has not refilled since January per pharmacy   - will start Metroprolol 50mg BID , atorvastatin 40mg daily     #Misc  - DVT Prophylaxis:n/a  - Diet: DASH  - GI Prophylaxis: none  - Activity: AAT  - IV Fluids: none  - Code Status: full    Dispo: Acute        67-year-old male with  PMHx of CAD/PVD s/p PCI in 2009 x2 stents (f/u with Dr. Bucio), DM, HLD, HTN, COPD, anxiety, and smoker 50 pack years s/p LLE femoral to above knee popliteal artery bypass with PTFE presents emergency department complaining of generalized weakness and shortness of breath for the past 5 days.       #Generalized Weakness 2/2 pericardial effusion: CT chest +L. upper lobe paramediastinal mass-like consolidation, pericardial effusion measuring 2.8cm    - Afebrile, WBC 12K   - No signs fluid overload on PE  -TSH WNL  - Pt recc- no skilled PT needs    #Microcytic Anemia  - Iron studies orderd on the 28th -WNL  - monitor CBC     #COPD  * expiratory wheeze on exam , denies any productive cough but endorses worsening sob  - start Duonebs and symbicort       #HTN (hypertension)  *home meds : lisinopril/ HCTZ 20mg - 12.5mg   - c/w lisinopril 20mg and HCTZ 12.5mg daily  - Monitor BP     #Diabetes  *home meds: 850mg metformin  - Monitor FS (gOAL 140-180)  - Start ISS  - Check A1C: 8.4    #Dyslipidemia  *patient is not taking stain per pharmacy   - Start atorvastatin 40mg daily   - low HDL/high LDL    #CAD x 2 stents  *patient was previously on metoprolol 50mg BID but has not refilled since January per pharmacy   - will start Metroprolol 50mg BID , atorvastatin 40mg daily     #Misc  - DVT Prophylaxis:n/a  - Diet: DASH  - GI Prophylaxis: none  - Activity: AAT  - IV Fluids: none  - Code Status: full    Dispo: Acute

## 2022-06-30 NOTE — PROGRESS NOTE ADULT - SUBJECTIVE AND OBJECTIVE BOX
Patient is a 67y old  Male who presents with a chief complaint of Generalized weakness and SOB (30 Jun 2022 13:11)        SUBJECTIVE:    No events remains on room air   Plan for pericardiocentesis with CTS        REVIEW OF SYSTEMS:  See HPI    PHYSICAL EXAM  Vital Signs Last 24 Hrs  T(C): 36.1 (30 Jun 2022 05:00), Max: 36.5 (29 Jun 2022 15:52)  T(F): 97 (30 Jun 2022 05:00), Max: 97.7 (29 Jun 2022 15:52)  HR: 81 (30 Jun 2022 05:00) (81 - 110)  BP: 130/67 (30 Jun 2022 05:00) (122/56 - 149/75)  BP(mean): --  RR: 18 (30 Jun 2022 05:00) (18 - 20)  SpO2: 96% (30 Jun 2022 05:00) (91% - 96%)    General: In NAD  HEENT: JAYASHREE               Lymphatic system: No Cervical LN    Respiratory: Nico BS, less wheezing today  Cardiovascular: Regular  Gastrointestinal: Soft. + BS  Musculoskeletal: No clubbing.  moves all extremities.  Full range of motion    Skin: Warm.  Intact  Neurological: No motor or sensory deficit        LABS:                          13.1   10.51 )-----------( 240      ( 29 Jun 2022 08:34 )             43.1                                               06-29    136  |  91<L>  |  14  ----------------------------<  218<H>  4.7   |  37<H>  |  0.8    Ca    9.7      29 Jun 2022 08:34  Mg     2.0     06-29    TPro  7.1  /  Alb  4.2  /  TBili  0.8  /  DBili  x   /  AST  34  /  ALT  65<H>  /  AlkPhos  84  06-29      PT/INR - ( 30 Jun 2022 12:32 )   PT: 13.40 sec;   INR: 1.17 ratio         PTT - ( 30 Jun 2022 12:32 )  PTT:29.7 sec                                                                                     LIVER FUNCTIONS - ( 29 Jun 2022 08:34 )  Alb: 4.2 g/dL / Pro: 7.1 g/dL / ALK PHOS: 84 U/L / ALT: 65 U/L / AST: 34 U/L / GGT: x                                                                                                MEDICATIONS  (STANDING):  albuterol/ipratropium for Nebulization 3 milliLiter(s) Nebulizer every 6 hours  atorvastatin 40 milliGRAM(s) Oral at bedtime  budesonide 160 MICROgram(s)/formoterol 4.5 MICROgram(s) Inhaler 2 Puff(s) Inhalation two times a day  dextrose 5%. 1000 milliLiter(s) (50 mL/Hr) IV Continuous <Continuous>  dextrose 5%. 1000 milliLiter(s) (100 mL/Hr) IV Continuous <Continuous>  dextrose 50% Injectable 25 Gram(s) IV Push once  dextrose 50% Injectable 12.5 Gram(s) IV Push once  dextrose 50% Injectable 25 Gram(s) IV Push once  glucagon  Injectable 1 milliGRAM(s) IntraMuscular once  hydrochlorothiazide 12.5 milliGRAM(s) Oral daily  insulin lispro (ADMELOG) corrective regimen sliding scale   SubCutaneous three times a day before meals  insulin lispro Injectable (ADMELOG) 5 Unit(s) SubCutaneous three times a day before meals  lisinopril 20 milliGRAM(s) Oral daily  melatonin 10 milliGRAM(s) Oral at bedtime  methylPREDNISolone sodium succinate Injectable 40 milliGRAM(s) IV Push daily  metoprolol tartrate 50 milliGRAM(s) Oral two times a day  tiotropium 18 MICROgram(s) Capsule 1 Capsule(s) Inhalation daily    MEDICATIONS  (PRN):  acetaminophen     Tablet .. 650 milliGRAM(s) Oral every 6 hours PRN Temp greater or equal to 38C (100.4F), Mild Pain (1 - 3)  aluminum hydroxide/magnesium hydroxide/simethicone Suspension 30 milliLiter(s) Oral every 4 hours PRN Dyspepsia  dextrose Oral Gel 15 Gram(s) Oral once PRN Blood Glucose LESS THAN 70 milliGRAM(s)/deciliter  ondansetron Injectable 4 milliGRAM(s) IV Push every 8 hours PRN Nausea and/or Vomiting

## 2022-06-30 NOTE — PATIENT PROFILE ADULT - NUMBER OF YRS
22 Alcohol-induced acute pancreatitis, unspecified complication status Transaminitis Transaminitis Transaminitis

## 2022-06-30 NOTE — PROGRESS NOTE ADULT - ATTENDING COMMENTS
67-year-old male with  PMHx of CAD/PVD s/p PCI in 2009 x2 stents (f/u with Dr. Bucio), DM, HLD, HTN, COPD, anxiety, and smoker 50 pack years s/p LLE femoral to above knee popliteal artery bypass with PTFE presents emergency department complaining of generalized weakness for the past 5 days.  Reports COCHRAN since the past few weeks.     #Generalized Weakness, COCHRAN:  #Possible malignant nodules on CT Chest  CRP elevated  CT Chest 06/28: PARISH masslike consolidation and patchy left lung opacities with bulky intrathoracic and right supraclavicular lymphadenopathy Suspicious of neoplastic process. Moderate left pleural effusion. large pericardial effusion  - Pulm planning Bronchoscopy/EBUS Bx on 7/5    #Moderate pericardial effusion:   May be malignant?  - Obtain echocardiogram today  - CT surgery planning Pericardiocentesis on 7/1. NPO after midnight. Pre-op labs.   send fluid for cytology.     #Microcytic Anemia  Ferritin high, possible AoCD  - monitor CBC     #COPD:  stable. May d/c steroids on 7/1?   Duoneb PRN.  Only heard mild end expi wheeze in [place].    #HTN (hypertension)  *home meds : lisinopril/ HCTZ 20mg - 12.5mg   - c/w lisinopril 20mg and HCTZ 12.5mg daily  - Monitor BP     #Diabetes  *home meds: 850mg metformin  - Monitor FS (gOAL 140-180)  - ISS  - Check A1C     #CAD x 2 stents  #HTN/HLD  - Cont metoprolol 50 BID  - Cont HCTZ 12.5mg qD  - Cont atorvastatin 40mg qHs  - Cont lisinopril 20mg qD    #Misc  - DVT Prophylaxis: SCD in case biopsy is going to be done    #Progress Note Handoff  Pending (specify): pulm eval for eval for malignant nodules  Family discussion: d/w pt regarding CT Chest findings  Disposition: Home

## 2022-06-30 NOTE — CHART NOTE - NSCHARTNOTEFT_GEN_A_CORE
Discussed case with CTS, patient planned for pericardiocentesis 07/01, therefore will defer Bronchoscopy/EBUS to 07/05.

## 2022-07-01 NOTE — PROGRESS NOTE ADULT - ATTENDING COMMENTS
67-year-old male with  PMHx of CAD/PVD s/p PCI in 2009 x2 stents (f/u with Dr. Bucio), DM, HLD, HTN, COPD, anxiety, and smoker 50 pack years s/p LLE femoral to above knee popliteal artery bypass with PTFE presents emergency department complaining of generalized weakness for the past 5 days.  Reports COCHRAN since the past few weeks.     #Generalized Weakness, COCHRAN:  #Possible malignant nodules on CT Chest  CRP elevated  CT Chest 06/28: PARISH masslike consolidation and patchy left lung opacities with bulky intrathoracic and right supraclavicular lymphadenopathy Suspicious of neoplastic process. Moderate left pleural effusion. large pericardial effusion  - Pulm planning Bronchoscopy/EBUS Bx on 7/5    #Moderate pericardial effusion:   May be malignant?  - CT surgery did Pericardiocentesis on 7/1.  f/u fluid labs/cytology.   watch the pericardial drain. Minimal clots removed by CT surgery post op. If further bleeding, call CT surgery.     #Microcytic Anemia  Ferritin high, possible AoCD  - monitor CBC     #COPD:  stable. May d/c steroids on 7/2?   Duoneb PRN.  Only heard mild end expi wheeze in [place].    #HTN (hypertension)  *home meds : lisinopril/ HCTZ 20mg - 12.5mg   - c/w lisinopril 20mg and HCTZ 12.5mg daily  - Monitor BP     #Diabetes  *home meds: 850mg metformin  - Monitor FS (gOAL 140-180)  - ISS  - Check A1C     #CAD x 2 stents  #HTN/HLD  - Cont metoprolol 50 BID  - Cont HCTZ 12.5mg qD  - Cont atorvastatin 40mg qHs  - Cont lisinopril 20mg qD    #Misc  - DVT Prophylaxis: SCD in case biopsy is going to be done    #Progress Note Handoff  Pending (specify): pulm eval for eval for malignant nodules  Family discussion: d/w pt regarding CT Chest findings  Disposition: Home .

## 2022-07-01 NOTE — PROGRESS NOTE ADULT - SUBJECTIVE AND OBJECTIVE BOX
CTS Attending  pt interviewed and examined  pt aware of the need for pericardial drainage  procedure explained, including risks, benefits and alternatives  pt gave informed consent for the procedure  case discussed with CT sutegery team  -FMR

## 2022-07-01 NOTE — PROCEDURE NOTE - NSPROCDETAILS_GEN_ALL_CORE
ECHO-GUIDED/location identified, draped/prepped, sterile technique used, needle inserted/introduced/Seldinger technique/all materials/supplies accounted for at end of procedure/site prepped/sterile

## 2022-07-01 NOTE — PROGRESS NOTE ADULT - ASSESSMENT
ASSESSMENT:  67y M with pericardial effusion     PLAN:  OR today for left VATS and pericardial drain   keep NPO, IVF  pain control  incentive spirometry  DVT/GI prophylaxis  SCDs, IS  encourage ambulation      spectra [phone]

## 2022-07-01 NOTE — PROGRESS NOTE ADULT - ASSESSMENT
67-year-old male with  PMHx of CAD/PVD s/p PCI in 2009 x2 stents (f/u with Dr. Bucio), DM, HLD, HTN, COPD, anxiety, and smoker 50 pack years s/p LLE femoral to above knee popliteal artery bypass with PTFE presents emergency department complaining of generalized weakness and shortness of breath for the past 5 days.       #Generalized Weakness 2/2 pericardial effusion: CT chest +L. upper lobe paramediastinal mass-like consolidation, pericardial effusion measuring 2.8cm    - Afebrile, WBC 12K   - No signs fluid overload on PE  -TSH WNL  - Pt recc- no skilled PT needs    #Microcytic Anemia  - Iron studies orderd on the 28th -WNL  - monitor CBC     #COPD  * expiratory wheeze on exam , denies any productive cough but endorses worsening sob  - start Duonebs and symbicort       #HTN (hypertension)  *home meds : lisinopril/ HCTZ 20mg - 12.5mg   - c/w lisinopril 20mg and HCTZ 12.5mg daily  - Monitor BP     #Diabetes  *home meds: 850mg metformin  - Monitor FS (gOAL 140-180)  - Start ISS  - Check A1C: 8.4    #Dyslipidemia  *patient is not taking stain per pharmacy   - Start atorvastatin 40mg daily   - low HDL/high LDL    #CAD x 2 stents  *patient was previously on metoprolol 50mg BID but has not refilled since January per pharmacy   - will start Metroprolol 50mg BID , atorvastatin 40mg daily     #Misc  - DVT Prophylaxis:n/a  - Diet: DASH  - GI Prophylaxis: none  - Activity: AAT  - IV Fluids: none  - Code Status: full    Dispo: Acute        67-year-old male with  PMHx of CAD/PVD s/p PCI in 2009 x2 stents (f/u with Dr. Bucio), DM, HLD, HTN, COPD, anxiety, and smoker 50 pack years s/p LLE femoral to above knee popliteal artery bypass with PTFE presents emergency department complaining of generalized weakness and shortness of breath for the past 5 days.       #Generalized Weakness 2/2 pericardial effusion: CT chest +L. upper lobe paramediastinal mass-like consolidation, pericardial effusion measuring 2.8cm    - Afebrile, WBC 12K   - No signs fluid overload on PE  -TSH WNL  - S/P pericardiocentesis: drained 500cc yellow serous fluid   - Pt recc- no skilled PT needs    #Microcytic Anemia  - Iron studies orderd on the 28th -WNL  - monitor CBC     #COPD  * expiratory wheeze on exam , denies any productive cough but endorses worsening sob  - start Duonebs and symbicort       #HTN (hypertension)  *home meds : lisinopril/ HCTZ 20mg - 12.5mg   - c/w lisinopril 20mg and HCTZ 12.5mg daily  - Monitor BP     #Diabetes  *home meds: 850mg metformin  - Monitor FS (gOAL 140-180)  - Start ISS  - Check A1C: 8.4    #Dyslipidemia  *patient is not taking stain per pharmacy   - Start atorvastatin 40mg daily   - low HDL/high LDL    #CAD x 2 stents  *patient was previously on metoprolol 50mg BID but has not refilled since January per pharmacy   - will start Metroprolol 50mg BID , atorvastatin 40mg daily     #Misc  - DVT Prophylaxis:n/a  - Diet: DASH/ carbohydrate consistant  - GI Prophylaxis: none  - Activity: AAT  - IV Fluids: none  - Code Status: full    Dispo: Acute        67-year-old male with  PMHx of CAD/PVD s/p PCI in 2009 x2 stents (f/u with Dr. Bucio), DM, HLD, HTN, COPD, anxiety, and smoker 50 pack years s/p LLE femoral to above knee popliteal artery bypass with PTFE presents emergency department complaining of generalized weakness and shortness of breath for the past 5 days.       #Generalized Weakness 2/2 pericardial effusion: CT chest +L. upper lobe paramediastinal mass-like consolidation, pericardial effusion measuring 2.8cm    - Afebrile, WBC 12K   - No signs fluid overload on PE  -TSH WNL  - S/P pericardiocentesis: drained 500cc yellow serous fluid   - Pt recc- no skilled PT needs  - Incentive spirometry and pain control    #Microcytic Anemia  - Iron studies orderd on the 28th -WNL  - monitor CBC     #COPD  * expiratory wheeze on exam , denies any productive cough but endorses worsening sob  - start Duonebs and symbicort       #HTN (hypertension)  *home meds : lisinopril/ HCTZ 20mg - 12.5mg   - c/w lisinopril 20mg and HCTZ 12.5mg daily  - Monitor BP     #Diabetes  *home meds: 850mg metformin  - Monitor FS (gOAL 140-180)  - Start ISS  - Check A1C: 8.4    #Dyslipidemia  *patient is not taking stain per pharmacy   - Start atorvastatin 40mg daily   - low HDL/high LDL    #CAD x 2 stents  *patient was previously on metoprolol 50mg BID but has not refilled since January per pharmacy   - will start Metroprolol 50mg BID , atorvastatin 40mg daily     #Misc  - DVT Prophylaxis:   - Diet: DASH/ carbohydrate consistant  - GI Prophylaxis: Protonix  - Activity: AAT  - IV Fluids: none  - Code Status: full    Dispo: Acute        67-year-old male with  PMHx of CAD/PVD s/p PCI in 2009 x2 stents (f/u with Dr. Bucio), DM, HLD, HTN, COPD, anxiety, and smoker 50 pack years s/p LLE femoral to above knee popliteal artery bypass with PTFE presents emergency department complaining of generalized weakness and shortness of breath for the past 5 days.       #Generalized Weakness 2/2 pericardial effusion: CT chest +L. upper lobe paramediastinal mass-like consolidation, pericardial effusion measuring 2.8cm    - Afebrile, WBC 12K   - No signs fluid overload on PE  -TSH WNL  - S/P pericardiocentesis: drained 500cc yellow serous fluid will keep on suction.   - Pt recc- no skilled PT needs  - Incentive spirometry and pain control  -CT surgery resident consulted for red tinged drainage-explained normal post procedure may be 2/2 clots will reach out if persists,     #Microcytic Anemia  - Iron studies orderd on the 28th -WNL  - monitor CBC     #COPD  * expiratory wheeze on exam , denies any productive cough but endorses worsening sob  - start Duonebs and symbicort       #HTN (hypertension)  *home meds : lisinopril/ HCTZ 20mg - 12.5mg   - c/w lisinopril 20mg and HCTZ 12.5mg daily  - Monitor BP     #Diabetes  *home meds: 850mg metformin  - Monitor FS (gOAL 140-180)  - Start ISS  - Check A1C: 8.4    #Dyslipidemia  *patient is not taking stain per pharmacy   - Start atorvastatin 40mg daily   - low HDL/high LDL    #CAD x 2 stents  *patient was previously on metoprolol 50mg BID but has not refilled since January per pharmacy   - will start Metroprolol 50mg BID , atorvastatin 40mg daily     #Misc  - DVT Prophylaxis: none  - Diet: DASH/ carbohydrate consistant  - GI Prophylaxis: Protonix  - Activity: as tolerated  - IV Fluids: none  - Code Status: full    Dispo: Acute

## 2022-07-01 NOTE — PROGRESS NOTE ADULT - SUBJECTIVE AND OBJECTIVE BOX
INTERVAL HPI/OVERNIGHT EVENTS: Pt states that he is anxious because he was recently seen by many doctors with talks about different procedures and potentially surgery. Denies any headaches, cough, fevers, chills, N/V/D/C, states that he is able to have BM and urinate normally, denies any chest pain or palpitations.       OBJECTIVE:    VITAL SIGNS:  ICU Vital Signs Last 24 Hrs  T(C): 36.1 (30 Jun 2022 05:00), Max: 36.5 (29 Jun 2022 15:52)  T(F): 97 (30 Jun 2022 05:00), Max: 97.7 (29 Jun 2022 15:52)  HR: 81 (30 Jun 2022 05:00) (81 - 110)  BP: 130/67 (30 Jun 2022 05:00) (122/56 - 149/75)  BP(mean): --  ABP: --  ABP(mean): --  RR: 18 (30 Jun 2022 05:00) (18 - 20)  SpO2: 96% (30 Jun 2022 05:00) (91% - 96%)        CAPILLARY BLOOD GLUCOSE      POCT Blood Glucose.: 277 mg/dL (30 Jun 2022 11:25)      PHYSICAL EXAM:    General: NAD  HEENT: NC/AT; PERRL, clear conjunctiva  Neck: supple, no JVD appreciated.  Respiratory: CTA b/l  Cardiovascular: distant heart sounds, RRR, mildly decreased pulse pressure on inspirations  Abdomen: soft, NT/ND; +BS x4  Extremities: WWP, 2+ peripheral pulses b/l; no LE edema  Skin: normal color and turgor; no rash      MEDICATIONS:  MEDICATIONS  (STANDING):  albuterol/ipratropium for Nebulization 3 milliLiter(s) Nebulizer every 6 hours  atorvastatin 40 milliGRAM(s) Oral at bedtime  budesonide 160 MICROgram(s)/formoterol 4.5 MICROgram(s) Inhaler 2 Puff(s) Inhalation two times a day  dextrose 5%. 1000 milliLiter(s) (50 mL/Hr) IV Continuous <Continuous>  dextrose 5%. 1000 milliLiter(s) (100 mL/Hr) IV Continuous <Continuous>  dextrose 50% Injectable 25 Gram(s) IV Push once  dextrose 50% Injectable 12.5 Gram(s) IV Push once  dextrose 50% Injectable 25 Gram(s) IV Push once  glucagon  Injectable 1 milliGRAM(s) IntraMuscular once  hydrochlorothiazide 12.5 milliGRAM(s) Oral daily  insulin lispro (ADMELOG) corrective regimen sliding scale   SubCutaneous three times a day before meals  insulin lispro Injectable (ADMELOG) 5 Unit(s) SubCutaneous three times a day before meals  lisinopril 20 milliGRAM(s) Oral daily  melatonin 10 milliGRAM(s) Oral at bedtime  methylPREDNISolone sodium succinate Injectable 40 milliGRAM(s) IV Push daily  metoprolol tartrate 50 milliGRAM(s) Oral two times a day  tiotropium 18 MICROgram(s) Capsule 1 Capsule(s) Inhalation daily    MEDICATIONS  (PRN):  acetaminophen     Tablet .. 650 milliGRAM(s) Oral every 6 hours PRN Temp greater or equal to 38C (100.4F), Mild Pain (1 - 3)  aluminum hydroxide/magnesium hydroxide/simethicone Suspension 30 milliLiter(s) Oral every 4 hours PRN Dyspepsia  dextrose Oral Gel 15 Gram(s) Oral once PRN Blood Glucose LESS THAN 70 milliGRAM(s)/deciliter  ondansetron Injectable 4 milliGRAM(s) IV Push every 8 hours PRN Nausea and/or Vomiting      ALLERGIES:  Allergies    No Known Allergies    Intolerances        LABS:                        13.1   10.51 )-----------( 240      ( 29 Jun 2022 08:34 )             43.1     06-29    136  |  91<L>  |  14  ----------------------------<  218<H>  4.7   |  37<H>  |  0.8    Ca    9.7      29 Jun 2022 08:34  Mg     2.0     06-29    TPro  7.1  /  Alb  4.2  /  TBili  0.8  /  DBili  x   /  AST  34  /  ALT  65<H>  /  AlkPhos  84  06-29          RADIOLOGY & ADDITIONAL TESTS: Reviewed. INTERVAL HPI/OVERNIGHT EVENTS: Pt states that he is anxious because he was recently seen by many doctors with talks about different procedures and potentially surgery. Denies any headaches, cough, fevers, chills, N/V/D/C, states that he is able to have BM and urinate normally, denies any chest pain or palpitations.       OBJECTIVE:    VITAL SIGNS:  ICU Vital Signs Last 24 Hrs  T(C): 36.1 (30 Jun 2022 05:00), Max: 36.5 (29 Jun 2022 15:52)  T(F): 97 (30 Jun 2022 05:00), Max: 97.7 (29 Jun 2022 15:52)  HR: 81 (30 Jun 2022 05:00) (81 - 110)  BP: 130/67 (30 Jun 2022 05:00) (122/56 - 149/75)  BP(mean): --  ABP: --  ABP(mean): --  RR: 18 (30 Jun 2022 05:00) (18 - 20)  SpO2: 96% (30 Jun 2022 05:00) (91% - 96%)        CAPILLARY BLOOD GLUCOSE      POCT Blood Glucose.: 277 mg/dL (30 Jun 2022 11:25)      PHYSICAL EXAM:    General: In pain s/p Pericardiocentesis procedure and drainage placement  HEENT: NC/AT; PERRL, clear conjunctiva  Neck: supple, no JVD appreciated.  Respiratory: CTA b/l  Cardiovascular: Difficult to asses 2/2 to pain  Abdomen: soft, NT/ND; +BS x4  Extremities: WWP, 2+ peripheral pulses b/l; no LE edema  Skin: normal color and turgor; no rash      MEDICATIONS:  MEDICATIONS  (STANDING):  albuterol/ipratropium for Nebulization 3 milliLiter(s) Nebulizer every 6 hours  atorvastatin 40 milliGRAM(s) Oral at bedtime  budesonide 160 MICROgram(s)/formoterol 4.5 MICROgram(s) Inhaler 2 Puff(s) Inhalation two times a day  dextrose 5%. 1000 milliLiter(s) (50 mL/Hr) IV Continuous <Continuous>  dextrose 5%. 1000 milliLiter(s) (100 mL/Hr) IV Continuous <Continuous>  dextrose 50% Injectable 25 Gram(s) IV Push once  dextrose 50% Injectable 12.5 Gram(s) IV Push once  dextrose 50% Injectable 25 Gram(s) IV Push once  glucagon  Injectable 1 milliGRAM(s) IntraMuscular once  hydrochlorothiazide 12.5 milliGRAM(s) Oral daily  insulin lispro (ADMELOG) corrective regimen sliding scale   SubCutaneous three times a day before meals  insulin lispro Injectable (ADMELOG) 5 Unit(s) SubCutaneous three times a day before meals  lisinopril 20 milliGRAM(s) Oral daily  melatonin 10 milliGRAM(s) Oral at bedtime  methylPREDNISolone sodium succinate Injectable 40 milliGRAM(s) IV Push daily  metoprolol tartrate 50 milliGRAM(s) Oral two times a day  tiotropium 18 MICROgram(s) Capsule 1 Capsule(s) Inhalation daily    MEDICATIONS  (PRN):  acetaminophen     Tablet .. 650 milliGRAM(s) Oral every 6 hours PRN Temp greater or equal to 38C (100.4F), Mild Pain (1 - 3)  aluminum hydroxide/magnesium hydroxide/simethicone Suspension 30 milliLiter(s) Oral every 4 hours PRN Dyspepsia  dextrose Oral Gel 15 Gram(s) Oral once PRN Blood Glucose LESS THAN 70 milliGRAM(s)/deciliter  ondansetron Injectable 4 milliGRAM(s) IV Push every 8 hours PRN Nausea and/or Vomiting      ALLERGIES:  Allergies    No Known Allergies    Intolerances        LABS:                        13.1   10.51 )-----------( 240      ( 29 Jun 2022 08:34 )             43.1     06-29    136  |  91<L>  |  14  ----------------------------<  218<H>  4.7   |  37<H>  |  0.8    Ca    9.7      29 Jun 2022 08:34  Mg     2.0     06-29    TPro  7.1  /  Alb  4.2  /  TBili  0.8  /  DBili  x   /  AST  34  /  ALT  65<H>  /  AlkPhos  84  06-29          RADIOLOGY & ADDITIONAL TESTS: Reviewed.

## 2022-07-01 NOTE — PROCEDURE NOTE - NSICDXPROCEDURE_GEN_ALL_CORE_FT
PROCEDURES:  Pericardiocentesis with echocardiographic guidance 01-Jul-2022 10:25:10  Mateusz Mathis

## 2022-07-01 NOTE — PROGRESS NOTE ADULT - SUBJECTIVE AND OBJECTIVE BOX
GENERAL SURGERY PROGRESS NOTE    Patient: PORFIRIO HERRERA , 67y ([birth year])Male   MRN: [id number]  Location: [address]   Visit: 06-27-22 Inpatient  Date: 07-01-22 @ 01:44    Procedure/Dx/Injuries: pericardial effusion    PAST MEDICAL & SURGICAL HISTORY:  HTN (hypertension)  Diabetes  Dyslipidemia  CAD (coronary artery disease)  History of coronary artery stent placement  x 2 stents      Vitals:   T(F): 97.5 (06-30-22 @ 20:42), Max: 97.7 (06-30-22 @ 03:41)  HR: 76 (06-30-22 @ 20:42)  BP: 102/76 (06-30-22 @ 20:42)  RR: 18 (06-30-22 @ 20:42)  SpO2: 93% (06-30-22 @ 20:42)      Diet, NPO after Midnight:      NPO Start Date: 30-Jun-2022,   NPO Start Time: 23:59  Diet, DASH/TLC:   Sodium & Cholesterol Restricted      Fluids:     I & O's:    Bowel Movement: : [] YES [] NO  Flatus: : [] YES [] NO    PHYSICAL EXAM:  General: NAD, AAOx3,  Cardiac: RRR S1, S2,  Respiratory: CTAB,  Abdomen: Soft, non-distended, non-tender,   Vascular: Pulses 2+ throughout, extremities well perfused    MEDICATIONS  (STANDING):  albuterol/ipratropium for Nebulization 3 milliLiter(s) Nebulizer every 6 hours  atorvastatin 40 milliGRAM(s) Oral at bedtime  budesonide 160 MICROgram(s)/formoterol 4.5 MICROgram(s) Inhaler 2 Puff(s) Inhalation two times a day  dextrose 5%. 1000 milliLiter(s) (50 mL/Hr) IV Continuous <Continuous>  dextrose 5%. 1000 milliLiter(s) (100 mL/Hr) IV Continuous <Continuous>  dextrose 50% Injectable 25 Gram(s) IV Push once  dextrose 50% Injectable 12.5 Gram(s) IV Push once  dextrose 50% Injectable 25 Gram(s) IV Push once  glucagon  Injectable 1 milliGRAM(s) IntraMuscular once  hydrochlorothiazide 12.5 milliGRAM(s) Oral daily  insulin lispro (ADMELOG) corrective regimen sliding scale   SubCutaneous three times a day before meals  insulin lispro Injectable (ADMELOG) 5 Unit(s) SubCutaneous three times a day before meals  lisinopril 20 milliGRAM(s) Oral daily  melatonin 10 milliGRAM(s) Oral at bedtime  methylPREDNISolone sodium succinate Injectable 40 milliGRAM(s) IV Push daily  metoprolol tartrate 50 milliGRAM(s) Oral two times a day  tiotropium 18 MICROgram(s) Capsule 1 Capsule(s) Inhalation daily    MEDICATIONS  (PRN):  acetaminophen     Tablet .. 650 milliGRAM(s) Oral every 6 hours PRN Temp greater or equal to 38C (100.4F), Mild Pain (1 - 3)  aluminum hydroxide/magnesium hydroxide/simethicone Suspension 30 milliLiter(s) Oral every 4 hours PRN Dyspepsia  dextrose Oral Gel 15 Gram(s) Oral once PRN Blood Glucose LESS THAN 70 milliGRAM(s)/deciliter  ondansetron Injectable 4 milliGRAM(s) IV Push every 8 hours PRN Nausea and/or Vomiting      DVT PROPHYLAXIS:   GI PROPHYLAXIS:   ANTICOAGULATION:   ANTIBIOTICS:      LAB/STUDIES:  Labs:  CAPILLARY BLOOD GLUCOSE    POCT Blood Glucose.: 157 mg/dL (30 Jun 2022 21:31)  POCT Blood Glucose.: 263 mg/dL (30 Jun 2022 16:26)  POCT Blood Glucose.: 277 mg/dL (30 Jun 2022 11:25)  POCT Blood Glucose.: 190 mg/dL (30 Jun 2022 07:43)                          13.8   14.91 )-----------( 253      ( 30 Jun 2022 22:20 )             44.2         06-30    136  |  90<L>  |  26<H>  ----------------------------<  141<H>  4.8   |  36<H>  |  0.8      Magnesium, Serum: 2.2 mg/dL (06-30-22 @ 22:20)      LFTs:             7.5  | 0.8  | 32       ------------------[96      ( 30 Jun 2022 22:20 )  4.5  | x    | 59          Lipase:x      Amylase:x             Coags:     13.70  ----< 1.19    ( 30 Jun 2022 22:20 )     29.0            Serum Pro-Brain Natriuretic Peptide: 791 pg/mL (06-27-22 @ 19:09)        IMAGING:      ACCESS/ DEVICES:  [x ] Peripheral IV  [ ] Central Venous Line	[ ] R	[ ] L	[ ] IJ	[ ] Fem	[ ] SC	Placed:   [ ] Arterial Line		[ ] R	[ ] L	[ ] Fem	[ ] Rad	[ ] Ax	Placed:   [ ] PICC:					[ ] Mediport  [ ] Urinary Catheter,  Date Placed:   [ ] Chest tube: [ ] Right, [ ] Left  [ ] TIANNA/Chucky Drains

## 2022-07-01 NOTE — CHART NOTE - NSCHARTNOTEFT_GEN_A_CORE
Post Operative Note  Patient: PORFIRIO HERRERA 67y (1955) Male   MRN: [id number]  Location: [address]  Visit: 06-27-22 Inpatient  Date: 07-01-22 @ 15:43    Procedure: S/P Pericardiocentesis     Subjective:   Nausea: no, Vomiting: no, Ambulating: yes  Pain Assessment: yes, Location: back , Pain Intensity: appropriate for post-op status    Objective:  Vitals: T(F): 96.2 (07-01-22 @ 14:00), Max: 97.5 (06-30-22 @ 20:42)  HR: 106 (07-01-22 @ 14:00)  BP: 122/60 (07-01-22 @ 14:00) (102/76 - 143/78)  RR: 20 (07-01-22 @ 14:00)  SpO2: 95% (07-01-22 @ 14:00)  Vent Settings:     In:   07-01-22 @ 07:01  -  07-01-22 @ 15:43  --------------------------------------------------------  IN: 100 mL      IV Fluids: dextrose 5%. 1000 milliLiter(s) (50 mL/Hr) IV Continuous <Continuous>  dextrose 5%. 1000 milliLiter(s) (100 mL/Hr) IV Continuous <Continuous>      Out:   07-01-22 @ 07:01  -  07-01-22 @ 15:43  --------------------------------------------------------  OUT: 900 mL      EBL:     Voided Urine:   07-01-22 @ 07:01  -  07-01-22 @ 15:43  --------------------------------------------------------  OUT: 900 mL      Taylor Catheter: no    ,   Chest Tube:   07-01-22 @ 07:01  -  07-01-22 @ 15:43  --------------------------------------------------------  OUT: 600 mL     Physical Examination:  General Appearance: NAD, A&O x3  HEENT: EOMI, sclera non-icteric.  Heart: RRR  Chest: pericardial drain in place attached to pleuravac   Lungs: bilateral chest rise  Abdomen:  Soft, nontender, nondistended  MSK/Extremities: Warm & well-perfused  Skin: Warm, dry. No jaundice.   Incisions/Wounds: Dressings in place, clean, dry and intact, no signs of infection/active bleeding/drainage    Medications: [Standing]  acetaminophen     Tablet .. 650 milliGRAM(s) Oral every 6 hours PRN  albuterol/ipratropium for Nebulization 3 milliLiter(s) Nebulizer every 6 hours  aluminum hydroxide/magnesium hydroxide/simethicone Suspension 30 milliLiter(s) Oral every 4 hours PRN  atorvastatin 40 milliGRAM(s) Oral at bedtime  budesonide 160 MICROgram(s)/formoterol 4.5 MICROgram(s) Inhaler 2 Puff(s) Inhalation two times a day  dextrose 5%. 1000 milliLiter(s) IV Continuous <Continuous>  dextrose 5%. 1000 milliLiter(s) IV Continuous <Continuous>  dextrose 50% Injectable 25 Gram(s) IV Push once  dextrose 50% Injectable 12.5 Gram(s) IV Push once  dextrose 50% Injectable 25 Gram(s) IV Push once  dextrose Oral Gel 15 Gram(s) Oral once PRN  furosemide   Injectable 20 milliGRAM(s) IV Push every 12 hours  glucagon  Injectable 1 milliGRAM(s) IntraMuscular once  hydrochlorothiazide 12.5 milliGRAM(s) Oral daily  insulin lispro (ADMELOG) corrective regimen sliding scale   SubCutaneous three times a day before meals  insulin lispro Injectable (ADMELOG) 5 Unit(s) SubCutaneous three times a day before meals  lisinopril 20 milliGRAM(s) Oral daily  melatonin 10 milliGRAM(s) Oral at bedtime  methylPREDNISolone sodium succinate Injectable 40 milliGRAM(s) IV Push daily  metoprolol tartrate 50 milliGRAM(s) Oral two times a day  ondansetron Injectable 4 milliGRAM(s) IV Push every 8 hours PRN  pantoprazole    Tablet 40 milliGRAM(s) Oral before breakfast  tiotropium 18 MICROgram(s) Capsule 1 Capsule(s) Inhalation daily    Medications: [PRN]  acetaminophen     Tablet .. 650 milliGRAM(s) Oral every 6 hours PRN  albuterol/ipratropium for Nebulization 3 milliLiter(s) Nebulizer every 6 hours  aluminum hydroxide/magnesium hydroxide/simethicone Suspension 30 milliLiter(s) Oral every 4 hours PRN  atorvastatin 40 milliGRAM(s) Oral at bedtime  budesonide 160 MICROgram(s)/formoterol 4.5 MICROgram(s) Inhaler 2 Puff(s) Inhalation two times a day  dextrose 5%. 1000 milliLiter(s) IV Continuous <Continuous>  dextrose 5%. 1000 milliLiter(s) IV Continuous <Continuous>  dextrose 50% Injectable 25 Gram(s) IV Push once  dextrose 50% Injectable 12.5 Gram(s) IV Push once  dextrose 50% Injectable 25 Gram(s) IV Push once  dextrose Oral Gel 15 Gram(s) Oral once PRN  furosemide   Injectable 20 milliGRAM(s) IV Push every 12 hours  glucagon  Injectable 1 milliGRAM(s) IntraMuscular once  hydrochlorothiazide 12.5 milliGRAM(s) Oral daily  insulin lispro (ADMELOG) corrective regimen sliding scale   SubCutaneous three times a day before meals  insulin lispro Injectable (ADMELOG) 5 Unit(s) SubCutaneous three times a day before meals  lisinopril 20 milliGRAM(s) Oral daily  melatonin 10 milliGRAM(s) Oral at bedtime  methylPREDNISolone sodium succinate Injectable 40 milliGRAM(s) IV Push daily  metoprolol tartrate 50 milliGRAM(s) Oral two times a day  ondansetron Injectable 4 milliGRAM(s) IV Push every 8 hours PRN  pantoprazole    Tablet 40 milliGRAM(s) Oral before breakfast  tiotropium 18 MICROgram(s) Capsule 1 Capsule(s) Inhalation daily    Labs:                        14.1   14.10 )-----------( 245      ( 01 Jul 2022 06:23 )             45.1     07-01    135  |  90<L>  |  21<H>  ----------------------------<  135<H>  4.8   |  32  |  0.8    Ca    9.9      01 Jul 2022 06:23  Phos  4.6     06-30  Mg     2.2     07-01    TPro  7.6  /  Alb  4.4  /  TBili  1.1  /  DBili  x   /  AST  36  /  ALT  60<H>  /  AlkPhos  86  07-01    PT/INR - ( 30 Jun 2022 22:20 )   PT: 13.70 sec;   INR: 1.19 ratio      PTT - ( 30 Jun 2022 22:20 )  PTT:29.0 sec    Imaging:  Chest 1 View- PORTABLE-Urgent (Xray Chest 1 View- PORTABLE-Urgent .) (07.01.22 @ 10:44) >    Impression:  Interval pericardial catheter placement. Essentially unchanged study.      Assessment:  67yMale patient S/P pericardiocentesis with echocardiographic guidance for pericardial effusion     Plan:  -care per primary team  -pericardial drain to suction  -monitor for clots in drain/call Green Team ([id number]) if any clots noted   -encourage IS  -encourage ambulation   -DVT/GI prophylaxis       Date/Time: 07-01-22 @ 15:43  Spectra [id number] Post Operative Note  Patient: PORFIRIO HERRERA 67y (1955) Male   MRN: [id number]  Location: [address]  Visit: 06-27-22 Inpatient  Date: 07-01-22 @ 15:43    Procedure: S/P Pericardiocentesis     Subjective:   Nausea: no, Vomiting: no, Ambulating: yes  Pain Assessment: yes, Location: back , Pain Intensity: appropriate for post-op status    Objective:  Vitals: T(F): 96.2 (07-01-22 @ 14:00), Max: 97.5 (06-30-22 @ 20:42)  HR: 106 (07-01-22 @ 14:00)  BP: 122/60 (07-01-22 @ 14:00) (102/76 - 143/78)  RR: 20 (07-01-22 @ 14:00)  SpO2: 95% (07-01-22 @ 14:00)  Vent Settings:     In:   07-01-22 @ 07:01  -  07-01-22 @ 15:43  --------------------------------------------------------  IN: 100 mL      IV Fluids: dextrose 5%. 1000 milliLiter(s) (50 mL/Hr) IV Continuous <Continuous>  dextrose 5%. 1000 milliLiter(s) (100 mL/Hr) IV Continuous <Continuous>      Out:   07-01-22 @ 07:01  -  07-01-22 @ 15:43  --------------------------------------------------------  OUT: 900 mL      EBL: minimal    Voided Urine:   07-01-22 @ 07:01  -  07-01-22 @ 15:43  --------------------------------------------------------  OUT: 900 mL      Taylor Catheter: no     Pericardial Drain:   07-01-22 @ 07:01  -  07-01-22 @ 15:43  --------------------------------------------------------  OUT: 600 mL     Physical Examination:  General Appearance: NAD, A&O x3  HEENT: EOMI, sclera non-icteric.  Heart: RRR  Chest: pericardial drain in place attached to pleuravac   Lungs: bilateral chest rise  Abdomen:  Soft, nontender, nondistended  MSK/Extremities: Warm & well-perfused  Skin: Warm, dry. No jaundice.   Incisions/Wounds: Dressings in place, clean, dry and intact, no signs of infection/active bleeding/drainage    Medications: [Standing]  acetaminophen     Tablet .. 650 milliGRAM(s) Oral every 6 hours PRN  albuterol/ipratropium for Nebulization 3 milliLiter(s) Nebulizer every 6 hours  aluminum hydroxide/magnesium hydroxide/simethicone Suspension 30 milliLiter(s) Oral every 4 hours PRN  atorvastatin 40 milliGRAM(s) Oral at bedtime  budesonide 160 MICROgram(s)/formoterol 4.5 MICROgram(s) Inhaler 2 Puff(s) Inhalation two times a day  dextrose 5%. 1000 milliLiter(s) IV Continuous <Continuous>  dextrose 5%. 1000 milliLiter(s) IV Continuous <Continuous>  dextrose 50% Injectable 25 Gram(s) IV Push once  dextrose 50% Injectable 12.5 Gram(s) IV Push once  dextrose 50% Injectable 25 Gram(s) IV Push once  dextrose Oral Gel 15 Gram(s) Oral once PRN  furosemide   Injectable 20 milliGRAM(s) IV Push every 12 hours  glucagon  Injectable 1 milliGRAM(s) IntraMuscular once  hydrochlorothiazide 12.5 milliGRAM(s) Oral daily  insulin lispro (ADMELOG) corrective regimen sliding scale   SubCutaneous three times a day before meals  insulin lispro Injectable (ADMELOG) 5 Unit(s) SubCutaneous three times a day before meals  lisinopril 20 milliGRAM(s) Oral daily  melatonin 10 milliGRAM(s) Oral at bedtime  methylPREDNISolone sodium succinate Injectable 40 milliGRAM(s) IV Push daily  metoprolol tartrate 50 milliGRAM(s) Oral two times a day  ondansetron Injectable 4 milliGRAM(s) IV Push every 8 hours PRN  pantoprazole    Tablet 40 milliGRAM(s) Oral before breakfast  tiotropium 18 MICROgram(s) Capsule 1 Capsule(s) Inhalation daily    Medications: [PRN]  acetaminophen     Tablet .. 650 milliGRAM(s) Oral every 6 hours PRN  albuterol/ipratropium for Nebulization 3 milliLiter(s) Nebulizer every 6 hours  aluminum hydroxide/magnesium hydroxide/simethicone Suspension 30 milliLiter(s) Oral every 4 hours PRN  atorvastatin 40 milliGRAM(s) Oral at bedtime  budesonide 160 MICROgram(s)/formoterol 4.5 MICROgram(s) Inhaler 2 Puff(s) Inhalation two times a day  dextrose 5%. 1000 milliLiter(s) IV Continuous <Continuous>  dextrose 5%. 1000 milliLiter(s) IV Continuous <Continuous>  dextrose 50% Injectable 25 Gram(s) IV Push once  dextrose 50% Injectable 12.5 Gram(s) IV Push once  dextrose 50% Injectable 25 Gram(s) IV Push once  dextrose Oral Gel 15 Gram(s) Oral once PRN  furosemide   Injectable 20 milliGRAM(s) IV Push every 12 hours  glucagon  Injectable 1 milliGRAM(s) IntraMuscular once  hydrochlorothiazide 12.5 milliGRAM(s) Oral daily  insulin lispro (ADMELOG) corrective regimen sliding scale   SubCutaneous three times a day before meals  insulin lispro Injectable (ADMELOG) 5 Unit(s) SubCutaneous three times a day before meals  lisinopril 20 milliGRAM(s) Oral daily  melatonin 10 milliGRAM(s) Oral at bedtime  methylPREDNISolone sodium succinate Injectable 40 milliGRAM(s) IV Push daily  metoprolol tartrate 50 milliGRAM(s) Oral two times a day  ondansetron Injectable 4 milliGRAM(s) IV Push every 8 hours PRN  pantoprazole    Tablet 40 milliGRAM(s) Oral before breakfast  tiotropium 18 MICROgram(s) Capsule 1 Capsule(s) Inhalation daily    Labs:                        14.1   14.10 )-----------( 245      ( 01 Jul 2022 06:23 )             45.1     07-01    135  |  90<L>  |  21<H>  ----------------------------<  135<H>  4.8   |  32  |  0.8    Ca    9.9      01 Jul 2022 06:23  Phos  4.6     06-30  Mg     2.2     07-01    TPro  7.6  /  Alb  4.4  /  TBili  1.1  /  DBili  x   /  AST  36  /  ALT  60<H>  /  AlkPhos  86  07-01    PT/INR - ( 30 Jun 2022 22:20 )   PT: 13.70 sec;   INR: 1.19 ratio      PTT - ( 30 Jun 2022 22:20 )  PTT:29.0 sec    Imaging:  Chest 1 View- PORTABLE-Urgent (Xray Chest 1 View- PORTABLE-Urgent .) (07.01.22 @ 10:44) >  Impression:  Interval pericardial catheter placement. Essentially unchanged study.    Assessment:  67yMale patient S/P pericardiocentesis with echocardiographic guidance for pericardial effusion     Plan:  -care per primary team  -pericardial drain to suction  -monitor for clots in drain/call Green Team ([id number]) if any clots noted   -encourage IS  -encourage ambulation   -DVT/GI prophylaxis     Date/Time: 07-01-22 @ 15:43  Spectra [id number]

## 2022-07-02 NOTE — PROGRESS NOTE ADULT - ASSESSMENT
67-year-old male with  PMHx of CAD/PVD s/p PCI in 2009 x2 stents (f/u with Dr. Bucio), DM, HLD, HTN, COPD, anxiety, and smoker 50 pack years s/p LLE femoral to above knee popliteal artery bypass with PTFE presents emergency department complaining of generalized weakness and shortness of breath for the past 5 days.       #Generalized Weakness 2/2 pericardial effusion: CT chest +L. upper lobe paramediastinal mass-like consolidation, pericardial effusion measuring 2.8cm    - Afebrile, WBC 12K   - No signs fluid overload on PE  -TSH WNL  - S/P pericardiocentesis: drained 500cc yellow serous fluid will keep on suction.   - Pt recc- no skilled PT needs  - Incentive spirometry and pain control  -CT surgery resident consulted for red tinged drainage-explained normal post procedure may be 2/2 clots will reach out if persists,   -reached out to CT surgery regarding reccs for pt post op ________    #Microcytic Anemia  - Iron studies orderd on the 28th -WNL  - monitor CBC     #COPD  * expiratory wheeze on exam , denies any productive cough but endorses worsening sob  - start Duonebs and symbicort       #HTN (hypertension)  *home meds : lisinopril/ HCTZ 20mg - 12.5mg   - c/w lisinopril 20mg and HCTZ 12.5mg daily  - Monitor BP     #Diabetes  *home meds: 850mg metformin  - Monitor FS (gOAL 140-180)  - Start ISS  - Check A1C: 8.4    #Dyslipidemia  *patient is not taking stain per pharmacy   - Start atorvastatin 40mg daily   - low HDL/high LDL    #CAD x 2 stents  *patient was previously on metoprolol 50mg BID but has not refilled since January per pharmacy   - will start Metroprolol 50mg BID , atorvastatin 40mg daily   -F/U EKG      #Misc  - DVT Prophylaxis: none  - Diet: DASH/ carbohydrate consistant  - GI Prophylaxis: Protonix  - Activity: as tolerated  - IV Fluids: none  - Code Status: full    Dispo: Acute        67-year-old male with  PMHx of CAD/PVD s/p PCI in 2009 x2 stents (f/u with Dr. Bucio), DM, HLD, HTN, COPD, anxiety, and smoker 50 pack years s/p LLE femoral to above knee popliteal artery bypass with PTFE presents emergency department complaining of generalized weakness and shortness of breath for the past 5 days.       #Generalized Weakness 2/2 pericardial effusion: CT chest +L. upper lobe paramediastinal mass-like consolidation, pericardial effusion measuring 2.8cm    - Afebrile, WBC 12K   - No signs fluid overload on PE  -TSH WNL  - S/P pericardiocentesis: drained 500cc yellow serous fluid will keep on suction.   - Pt recc- no skilled PT needs  - Incentive spirometry and pain control  -CT surgery resident consulted for red tinged drainage-explained normal post procedure may be 2/2 clots will reach out if persists,   -appreciate CT surgery reccs: for pt post op state than chest tube can be removed tomorrow--   - Monitor pericardial drain output  - Strip drain PRN for clotting  - IS encouraged  - Ambulate as tolerated    #Microcytic Anemia  - Iron studies orderd on the 28th -WNL  - monitor CBC     #COPD  * expiratory wheeze on exam , denies any productive cough but endorses worsening sob  - start Duonebs and symbicort       #HTN (hypertension)  *home meds : lisinopril/ HCTZ 20mg - 12.5mg   - c/w lisinopril 20mg and HCTZ 12.5mg daily  - Monitor BP     #Diabetes  *home meds: 850mg metformin  - Monitor FS (gOAL 140-180)  - Start ISS  - Check A1C: 8.4    #Dyslipidemia  *patient is not taking stain per pharmacy   - Start atorvastatin 40mg daily   - low HDL/high LDL    #CAD x 2 stents  *patient was previously on metoprolol 50mg BID but has not refilled since January per pharmacy   - will start Metroprolol 50mg BID , atorvastatin 40mg daily   -F/U EKG      #Misc  - DVT Prophylaxis: none  - Diet: DASH/ carbohydrate consistant  - GI Prophylaxis: Protonix  - Activity: as tolerated  - IV Fluids: none  - Code Status: full    Dispo: Acute

## 2022-07-02 NOTE — PROGRESS NOTE ADULT - SUBJECTIVE AND OBJECTIVE BOX
OVERNIGHT EVENTS: Patient states that he is not tolerating pain very well states that it is at a level of 9-10 today , he would like to know when the draining tube will be able to come out. Pt states that the tube has stopped draining red fluid.     VITAL SIGNS:  Vital Signs Last 24 Hrs  T(C): 36 (02 Jul 2022 05:06), Max: 36.5 (01 Jul 2022 20:42)  T(F): 96.8 (02 Jul 2022 05:06), Max: 97.7 (01 Jul 2022 20:42)  HR: 129 (02 Jul 2022 05:06) (90 - 129)  BP: 148/70 (02 Jul 2022 05:06) (113/56 - 148/70)  BP(mean): --  RR: 17 (02 Jul 2022 05:06) (17 - 20)  SpO2: 95% (02 Jul 2022 05:06) (92% - 95%)    PHYSICAL EXAM:    General: Well developed, well nourished, no acute distress  HEENT: NC/AT; PERRL, anicteric sclera; MMM  Neck: supple  Cardiovascular: +S1/S2, no RRR, no murmurs, rubs, gallops appreciated  Respiratory: CTA B/L; no W/R/R  Gastrointestinal: soft, NT/ND; +BSx4  Extremities: WWP; no edema, clubbing or cyanosis  Vascular: 2+ radial, DP/PT pulses B/L    MEDICATIONS:  MEDICATIONS  (STANDING):  albuterol/ipratropium for Nebulization 3 milliLiter(s) Nebulizer every 6 hours  atorvastatin 40 milliGRAM(s) Oral at bedtime  budesonide 160 MICROgram(s)/formoterol 4.5 MICROgram(s) Inhaler 2 Puff(s) Inhalation two times a day  dextrose 5%. 1000 milliLiter(s) (50 mL/Hr) IV Continuous <Continuous>  dextrose 5%. 1000 milliLiter(s) (100 mL/Hr) IV Continuous <Continuous>  dextrose 50% Injectable 25 Gram(s) IV Push once  dextrose 50% Injectable 12.5 Gram(s) IV Push once  dextrose 50% Injectable 25 Gram(s) IV Push once  glucagon  Injectable 1 milliGRAM(s) IntraMuscular once  insulin lispro (ADMELOG) corrective regimen sliding scale   SubCutaneous three times a day before meals  insulin lispro Injectable (ADMELOG) 5 Unit(s) SubCutaneous three times a day before meals  lisinopril 20 milliGRAM(s) Oral daily  melatonin 10 milliGRAM(s) Oral at bedtime  methylPREDNISolone sodium succinate Injectable 40 milliGRAM(s) IV Push daily  metoprolol tartrate 50 milliGRAM(s) Oral two times a day  pantoprazole    Tablet 40 milliGRAM(s) Oral before breakfast  tiotropium 18 MICROgram(s) Capsule 1 Capsule(s) Inhalation daily    MEDICATIONS  (PRN):  acetaminophen     Tablet .. 650 milliGRAM(s) Oral every 6 hours PRN Temp greater or equal to 38C (100.4F), Mild Pain (1 - 3)  aluminum hydroxide/magnesium hydroxide/simethicone Suspension 30 milliLiter(s) Oral every 4 hours PRN Dyspepsia  dextrose Oral Gel 15 Gram(s) Oral once PRN Blood Glucose LESS THAN 70 milliGRAM(s)/deciliter  morphine  - Injectable 2 milliGRAM(s) IV Push every 6 hours PRN Severe Pain (7 - 10)  ondansetron Injectable 4 milliGRAM(s) IV Push every 8 hours PRN Nausea and/or Vomiting  oxycodone    5 mG/acetaminophen 325 mG 1 Tablet(s) Oral every 4 hours PRN Moderate Pain (4 - 6)      ALLERGIES:  Allergies    No Known Allergies    Intolerances        LABS:                        14.1   14.10 )-----------( 245      ( 01 Jul 2022 06:23 )             45.1     07-01    135  |  90<L>  |  21<H>  ----------------------------<  135<H>  4.8   |  32  |  0.8    Ca    9.9      01 Jul 2022 06:23  Phos  4.6     06-30  Mg     2.2     07-01    TPro  7.6  /  Alb  4.4  /  TBili  1.1  /  DBili  x   /  AST  36  /  ALT  60<H>  /  AlkPhos  86  07-01    PT/INR - ( 30 Jun 2022 22:20 )   PT: 13.70 sec;   INR: 1.19 ratio         PTT - ( 30 Jun 2022 22:20 )  PTT:29.0 sec    CAPILLARY BLOOD GLUCOSE      POCT Blood Glucose.: 283 mg/dL (02 Jul 2022 11:16)      RADIOLOGY & ADDITIONAL TESTS: Reviewed.    PLAN:

## 2022-07-02 NOTE — PROGRESS NOTE ADULT - SUBJECTIVE AND OBJECTIVE BOX
GENERAL SURGERY PROGRESS NOTE    Patient: PORFIRIO HERRERA , 67y ([birth year])Male   MRN: [id number]  Location: [address]  Visit: 06-27-22 Inpatient  Date: 07-02-22 @ 02:56    Procedure/Dx/Injuries: pericardial effusion s/p bedside pericardiocentesis    Events of past 24 hours: patient tolerated pericardiocentesis with no complications, drain left in place. Drain stripped twice overnight due to frequent clotting.     PAST MEDICAL & SURGICAL HISTORY:  HTN (hypertension)      Diabetes      Dyslipidemia      CAD (coronary artery disease)      History of coronary artery stent placement  x 2 stents          Vitals:   T(F): 97.7 (07-01-22 @ 20:42), Max: 97.7 (07-01-22 @ 20:42)  HR: 104 (07-01-22 @ 20:42)  BP: 117/58 (07-01-22 @ 20:42)  RR: 18 (07-01-22 @ 20:42)  SpO2: 92% (07-01-22 @ 20:42)      Diet, DASH/TLC:   Sodium & Cholesterol Restricted  Consistent Carbohydrate No Snacks      PHYSICAL EXAM:  General: NAD  Cardiac: RRR, pericardial drain in place to suction  Respiratory: no labored breathing, normal respiratory effort  Abdomen: Soft, non-distended, non-tender  Skin: Warm/dry, normal color, no jaundice      MEDICATIONS  (STANDING):  albuterol/ipratropium for Nebulization 3 milliLiter(s) Nebulizer every 6 hours  atorvastatin 40 milliGRAM(s) Oral at bedtime  budesonide 160 MICROgram(s)/formoterol 4.5 MICROgram(s) Inhaler 2 Puff(s) Inhalation two times a day  dextrose 5%. 1000 milliLiter(s) (50 mL/Hr) IV Continuous <Continuous>  dextrose 5%. 1000 milliLiter(s) (100 mL/Hr) IV Continuous <Continuous>  dextrose 50% Injectable 25 Gram(s) IV Push once  dextrose 50% Injectable 12.5 Gram(s) IV Push once  dextrose 50% Injectable 25 Gram(s) IV Push once  glucagon  Injectable 1 milliGRAM(s) IntraMuscular once  insulin lispro (ADMELOG) corrective regimen sliding scale   SubCutaneous three times a day before meals  insulin lispro Injectable (ADMELOG) 5 Unit(s) SubCutaneous three times a day before meals  lisinopril 20 milliGRAM(s) Oral daily  melatonin 10 milliGRAM(s) Oral at bedtime  methylPREDNISolone sodium succinate Injectable 40 milliGRAM(s) IV Push daily  metoprolol tartrate 50 milliGRAM(s) Oral two times a day  pantoprazole    Tablet 40 milliGRAM(s) Oral before breakfast  tiotropium 18 MICROgram(s) Capsule 1 Capsule(s) Inhalation daily    MEDICATIONS  (PRN):  acetaminophen     Tablet .. 650 milliGRAM(s) Oral every 6 hours PRN Temp greater or equal to 38C (100.4F), Mild Pain (1 - 3)  aluminum hydroxide/magnesium hydroxide/simethicone Suspension 30 milliLiter(s) Oral every 4 hours PRN Dyspepsia  dextrose Oral Gel 15 Gram(s) Oral once PRN Blood Glucose LESS THAN 70 milliGRAM(s)/deciliter  morphine  - Injectable 2 milliGRAM(s) IV Push four times a day PRN Moderate Pain (4 - 6)  ondansetron Injectable 4 milliGRAM(s) IV Push every 8 hours PRN Nausea and/or Vomiting      DVT PROPHYLAXIS:   GI PROPHYLAXIS: pantoprazole    Tablet 40 milliGRAM(s) Oral before breakfast    ANTICOAGULATION:   ANTIBIOTICS:            LAB/STUDIES:  Labs:  CAPILLARY BLOOD GLUCOSE      POCT Blood Glucose.: 316 mg/dL (01 Jul 2022 21:16)  POCT Blood Glucose.: 271 mg/dL (01 Jul 2022 16:26)  POCT Blood Glucose.: 204 mg/dL (01 Jul 2022 07:35)                          14.1   14.10 )-----------( 245      ( 01 Jul 2022 06:23 )             45.1       Auto Neutrophil %: 70.1 % (07-01-22 @ 06:23)  Auto Immature Granulocyte %: 0.4 % (07-01-22 @ 06:23)    07-01    135  |  90<L>  |  21<H>  ----------------------------<  135<H>  4.8   |  32  |  0.8      Calcium, Total Serum: 9.9 mg/dL (07-01-22 @ 06:23)      LFTs:             7.6  | 1.1  | 36       ------------------[86      ( 01 Jul 2022 06:23 )  4.4  | x    | 60          Lipase:x      Amylase:x             Coags:     13.70  ----< 1.19    ( 30 Jun 2022 22:20 )     29.0            Serum Pro-Brain Natriuretic Peptide: 791 pg/mL (06-27-22 @ 19:09)          Culture - Acid Fast - Body Fluid w/Smear (collected 01 Jul 2022 10:14)  Source: .Body Fluid None

## 2022-07-02 NOTE — PROGRESS NOTE ADULT - ATTENDING COMMENTS
67-year-old male with  PMHx of CAD/PVD s/p PCI in 2009 x2 stents (f/u with Dr. Bucio), DM, HLD, HTN, COPD, anxiety, and smoker 50 pack years s/p LLE femoral to above knee popliteal artery bypass with PTFE presents emergency department complaining of generalized weakness for the past 5 days.  Reports COCHRAN since the past few weeks.     #Generalized Weakness, COCHRAN:  #Possible malignant nodules on CT Chest  CRP elevated  CT Chest 06/28: PARISH masslike consolidation and patchy left lung opacities with bulky intrathoracic and right supraclavicular lymphadenopathy Suspicious of neoplastic process. Moderate left pleural effusion. large pericardial effusion  - Pulm planning Bronchoscopy/EBUS Bx on 7/5    #Moderate pericardial effusion:   May be malignant?  - CT surgery did Pericardiocentesis on 7/1.  f/u fluid labs/cytology.   watch the pericardial drain (to be removed on 7/3?). Minimal clots removed by CT surgery post op. If further bleeding, call CT surgery.   Morphine 2Q6PRN for surgery site pain.     #Microcytic Anemia  Ferritin high, possible AoCD  - monitor CBC     #COPD:  stable. On Steroids since 6/28. MAy d/c steroids by 7/4?   Only mild wheezing on exam.   Duoneb PRN.    #HTN (hypertension)  *home meds : lisinopril/ HCTZ 20mg - 12.5mg   - c/w lisinopril 20mg and HCTZ 12.5mg daily  - Monitor BP     #Diabetes  *home meds: 850mg metformin  - Monitor FS (gOAL 140-180)  - ISS  - Check A1C     #CAD x 2 stents  #HTN/HLD  - Cont metoprolol 50 BID  - Cont HCTZ 12.5mg qD  - Cont atorvastatin 40mg qHs  - Cont lisinopril 20mg qD    #Sinus Tachycardia:  @120s on 7/2. Better after pain meds.     #Misc  - DVT Prophylaxis: SCD since biopsy is going to be done. PAtient ambulatory.     #Progress Note Handoff  Pending (specify): pulm eval for eval for malignant nodules  Family discussion: d/w pt regarding CT Chest findings  Disposition: Home .

## 2022-07-02 NOTE — PROGRESS NOTE ADULT - ASSESSMENT
ASSESSMENT:  67y M w/ pericardial effusion s/p pericardiocentesis    PLAN:  - Care as per primary team  - Keep drain to suction  - Monitor pericardial drain output  - Strip drain PRN for clotting  - IS encouraged  - Ambulate as tolerated    [id number]       ASSESSMENT:  67y M w/ pericardial effusion s/p pericardiocentesis    PLAN:  - Care as per primary team  - Drain to water seal  - Monitor pericardial drain output  - Strip drain PRN for clotting  - IS encouraged  - Ambulate as tolerated    [id number]

## 2022-07-03 NOTE — PROGRESS NOTE ADULT - ASSESSMENT
ASSESSMENT:  67y M w/ pericardial effusion s/p pericardiocentesis    PLAN:  - Post pericardial drain pull CXR stable, no further surgical intervention required  - Care as per primary team  - IS encouraged  - Ambulate as tolerated  - Recall surgery PRN    [id number]

## 2022-07-03 NOTE — PROGRESS NOTE ADULT - ASSESSMENT
67-year-old male with  PMHx of CAD/PVD s/p PCI in 2009 x2 stents (f/u with Dr. Bucio), DM, HLD, HTN, COPD, anxiety, and smoker 50 pack years s/p LLE femoral to above knee popliteal artery bypass with PTFE presents emergency department complaining of generalized weakness for the past 5 days.  Reports COCHRAN since the past few weeks.     #Generalized Weakness, COCHRAN:  #Possible malignant nodules on CT Chest  CRP elevated  CT Chest 06/28: PARISH masslike consolidation and patchy left lung opacities with bulky intrathoracic and right supraclavicular lymphadenopathy Suspicious of neoplastic process. Moderate left pleural effusion. large pericardial effusion  - Pulm planning Bronchoscopy/EBUS Bx on 7/5    #Moderate pericardial effusion:   May be malignant?  - CT surgery did Pericardiocentesis on 7/1.  f/u fluid labs/cytology.   watch the pericardial drain (to be removed on 7/3?). Minimal clots removed by CT surgery post op. If further bleeding, call CT surgery.   Morphine 2Q6PRN for surgery site pain.     #Microcytic Anemia  Ferritin high, possible AoCD  - monitor CBC     #COPD:  stable. On Steroids since 6/28. MAy d/c steroids 7/3?   Only mild wheezing on exam.   Duoneb PRN.    #HTN (hypertension)  *home meds : lisinopril/ HCTZ 20mg - 12.5mg   - c/w lisinopril 20mg and HCTZ 12.5mg daily  - Monitor BP     #Diabetes  *home meds: 850mg metformin  - Monitor FS (gOAL 140-180)  - Lispro 5 TIDAC + ISS  - Check A1C     #CAD x 2 stents  #HTN/HLD  - Cont metoprolol 50 BID  - Cont HCTZ 12.5mg qD  - Cont atorvastatin 40mg qHs  - Cont lisinopril 20mg qD    #Sinus Tachycardia:  @120s on 7/2. Better after pain meds.     #Misc  - DVT Prophylaxis: Lovenox 40 QD,. Hold on Tuesday morning for procedure.     #Progress Note Handoff  Pending (specify): pulm bx for eval for malignant nodules  Family discussion: d/w pt regarding CT Chest findings  Disposition: Home .    67-year-old male with  PMHx of CAD/PVD s/p PCI in 2009 x2 stents (f/u with Dr. Bucio), DM, HLD, HTN, COPD, anxiety, and smoker 50 pack years s/p LLE femoral to above knee popliteal artery bypass with PTFE presents emergency department complaining of generalized weakness for the past 5 days.  Reports COCHRAN since the past few weeks.     #Generalized Weakness, COCHRAN:  #Possible malignant nodules on CT Chest  CRP elevated  CT Chest 06/28: PARISH masslike consolidation and patchy left lung opacities with bulky intrathoracic and right supraclavicular lymphadenopathy Suspicious of neoplastic process. Moderate left pleural effusion. large pericardial effusion  - Pulm planning Bronchoscopy/EBUS Bx on 7/5    #Moderate pericardial effusion:   May be malignant?  - CT surgery did Pericardiocentesis on 7/1.  f/u fluid labs/cytology.   watch the pericardial drain (to be removed on 7/3?). Minimal clots removed by CT surgery post op. If further bleeding, call CT surgery.   Morphine 2Q6PRN for surgery site pain.     #Microcytic Anemia  Ferritin high, possible AoCD  - monitor CBC     #COPD:  stable. On Steroids since 6/28. MAy d/c steroids 7/3  Only mild wheezing on exam.   Duoneb PRN.    #HTN (hypertension)  *home meds : lisinopril/ HCTZ 20mg - 12.5mg   - c/w lisinopril 20mg and HCTZ 12.5mg daily  - Monitor BP     #Diabetes  *home meds: 850mg metformin  - Monitor FS (gOAL 140-180)  - Lispro 5 TIDAC + ISS  - Check A1C     #CAD x 2 stents  #HTN/HLD  - Cont metoprolol 50 BID  - Cont HCTZ 12.5mg qD  - Cont atorvastatin 40mg qHs  - Cont lisinopril 20mg qD    #Sinus Tachycardia:  @120s on 7/2. Better after pain meds.     #Misc  - DVT Prophylaxis: Lovenox 40 QD,. Hold on Tuesday morning for procedure.     #Progress Note Handoff  Pending (specify): pulm bx for eval for malignant nodules  Family discussion: d/w pt regarding CT Chest findings  Disposition: Home .

## 2022-07-03 NOTE — PROGRESS NOTE ADULT - SUBJECTIVE AND OBJECTIVE BOX
GENERAL SURGERY PROGRESS NOTE    Patient: PORFIRIO HERRERA , 67y ([birth year])Male   MRN: [id number]  Location: [address]  Visit: 06-27-22 Inpatient  Date: 07-03-22 @ 15:56    Hospital Day #:5    Procedure/Dx/Injuries: pericardial effusion s/p bedside pericardiocentesis    Events of past 24 hours:  -7/2 drain placed to water seal  -7/3 drain dc'd, post-pull CXR stable    PAST MEDICAL & SURGICAL HISTORY:  HTN (hypertension)  Diabetes  Dyslipidemia  CAD (coronary artery disease)  History of coronary artery stent placement  x 2 stents    Vitals:   T(F): 97.8 (07-03-22 @ 12:15), Max: 97.8 (07-02-22 @ 20:23)  HR: 99 (07-03-22 @ 12:15)  BP: 117/65 (07-03-22 @ 12:15)  RR: 18 (07-03-22 @ 12:15)  SpO2: 94% (07-03-22 @ 07:55)    Diet, DASH/TLC:   Sodium & Cholesterol Restricted  Consistent Carbohydrate No Snacks    PHYSICAL EXAM:  General: NAD, AAOx3, calm and cooperative  Cardiac: RRR S1, S2  Respiratory: normal respiratory effort  Abdomen: non-distended, non-tender  Skin: Warm/dry, normal color, no jaundice  Incision/wound: healing well, dressings in place, clean, dry and intact    MEDICATIONS  (STANDING):  albuterol/ipratropium for Nebulization 3 milliLiter(s) Nebulizer every 6 hours  atorvastatin 40 milliGRAM(s) Oral at bedtime  budesonide 160 MICROgram(s)/formoterol 4.5 MICROgram(s) Inhaler 2 Puff(s) Inhalation two times a day  dextrose 5%. 1000 milliLiter(s) (50 mL/Hr) IV Continuous <Continuous>  dextrose 5%. 1000 milliLiter(s) (100 mL/Hr) IV Continuous <Continuous>  dextrose 50% Injectable 25 Gram(s) IV Push once  dextrose 50% Injectable 12.5 Gram(s) IV Push once  dextrose 50% Injectable 25 Gram(s) IV Push once  glucagon  Injectable 1 milliGRAM(s) IntraMuscular once  heparin   Injectable 5000 Unit(s) SubCutaneous every 8 hours  insulin lispro (ADMELOG) corrective regimen sliding scale   SubCutaneous three times a day before meals  insulin lispro Injectable (ADMELOG) 5 Unit(s) SubCutaneous three times a day before meals  lisinopril 20 milliGRAM(s) Oral daily  melatonin 10 milliGRAM(s) Oral at bedtime  methylPREDNISolone sodium succinate Injectable 40 milliGRAM(s) IV Push daily  metoprolol tartrate 50 milliGRAM(s) Oral two times a day  pantoprazole    Tablet 40 milliGRAM(s) Oral before breakfast  tiotropium 18 MICROgram(s) Capsule 1 Capsule(s) Inhalation daily    MEDICATIONS  (PRN):  acetaminophen     Tablet .. 650 milliGRAM(s) Oral every 6 hours PRN Temp greater or equal to 38C (100.4F), Mild Pain (1 - 3)  aluminum hydroxide/magnesium hydroxide/simethicone Suspension 30 milliLiter(s) Oral every 4 hours PRN Dyspepsia  dextrose Oral Gel 15 Gram(s) Oral once PRN Blood Glucose LESS THAN 70 milliGRAM(s)/deciliter  morphine  - Injectable 2 milliGRAM(s) IV Push every 6 hours PRN Severe Pain (7 - 10)  ondansetron Injectable 4 milliGRAM(s) IV Push every 8 hours PRN Nausea and/or Vomiting  oxycodone    5 mG/acetaminophen 325 mG 1 Tablet(s) Oral every 4 hours PRN Moderate Pain (4 - 6)    DVT PROPHYLAXIS: heparin   Injectable 5000 Unit(s) SubCutaneous every 8 hours    GI PROPHYLAXIS: pantoprazole    Tablet 40 milliGRAM(s) Oral before breakfast    ANTICOAGULATION:   ANTIBIOTICS:      LAB/STUDIES:  Labs:  CAPILLARY BLOOD GLUCOSE    POCT Blood Glucose.: 329 mg/dL (03 Jul 2022 11:42)  POCT Blood Glucose.: 171 mg/dL (03 Jul 2022 07:54)  POCT Blood Glucose.: 240 mg/dL (02 Jul 2022 16:26)               14.6   18.94 )-----------( 263      ( 03 Jul 2022 06:22 )             46.3       Auto Neutrophil %: 69.3 % (07-03-22 @ 06:22)  Auto Immature Granulocyte %: 0.4 % (07-03-22 @ 06:22)    07-03    134<L>  |  90<L>  |  23<H>  ----------------------------<  147<H>  4.7   |  32  |  0.7    Calcium, Total Serum: 9.4 mg/dL (07-03-22 @ 06:22)    LFTs:             7.0  | 1.3  | 22       ------------------[69      ( 03 Jul 2022 06:22 )  3.9  | x    | 32          Lipase:x      Amylase:x        Serum Pro-Brain Natriuretic Peptide: 791 pg/mL (06-27-22 @ 19:09)    Culture - Acid Fast - Body Fluid w/Smear (collected 01 Jul 2022 10:14)  Source: .Body Fluid None    ACCESS/ DEVICES:  [ ] Peripheral IV  [ ] Central Venous Line	[ ] R	[ ] L	[ ] IJ	[ ] Fem	[ ] SC	Placed:   [ ] Arterial Line		[ ] R	[ ] L	[ ] Fem	[ ] Rad	[ ] Ax	Placed:   [ ] PICC:					[ ] Mediport  [ ] Urinary Catheter,  Date Placed:   [ ] Chest tube: [ ] Right, [ ] Left  [ ] TIANNA/Chucky Drains

## 2022-07-03 NOTE — PROGRESS NOTE ADULT - SUBJECTIVE AND OBJECTIVE BOX
S: No new events/complaints  Mild soreness at drain site.     All other pertinent ROS negative.      Vital Signs Last 24 Hrs  T(C): 36.6 (03 Jul 2022 12:15), Max: 36.6 (02 Jul 2022 20:23)  T(F): 97.8 (03 Jul 2022 12:15), Max: 97.8 (02 Jul 2022 20:23)  HR: 99 (03 Jul 2022 12:15) (89 - 113)  BP: 117/65 (03 Jul 2022 12:15) (110/64 - 124/73)  BP(mean): --  RR: 18 (03 Jul 2022 12:15) (18 - 18)  SpO2: 94% (03 Jul 2022 07:55) (90% - 98%)  PHYSICAL EXAM:    Constitutional: NAD, awake and alert, well-developed  HEENT: PERR, EOMI, Normal Hearing, MMM  Neck: Soft and supple, No LAD, No JVD  Respiratory: Breath sounds are clear bilaterally, No wheezing, rales or rhonchi  Cardiovascular: S1 and S2, regular rate and rhythm, no Murmurs, gallops or rubs  Gastrointestinal: Bowel Sounds present, soft, nontender, nondistended, no guarding, no rebound  Extremities: No peripheral edema      MEDICATIONS:  MEDICATIONS  (STANDING):  albuterol/ipratropium for Nebulization 3 milliLiter(s) Nebulizer every 6 hours  atorvastatin 40 milliGRAM(s) Oral at bedtime  budesonide 160 MICROgram(s)/formoterol 4.5 MICROgram(s) Inhaler 2 Puff(s) Inhalation two times a day  dextrose 5%. 1000 milliLiter(s) (50 mL/Hr) IV Continuous <Continuous>  dextrose 5%. 1000 milliLiter(s) (100 mL/Hr) IV Continuous <Continuous>  dextrose 50% Injectable 25 Gram(s) IV Push once  dextrose 50% Injectable 12.5 Gram(s) IV Push once  dextrose 50% Injectable 25 Gram(s) IV Push once  glucagon  Injectable 1 milliGRAM(s) IntraMuscular once  heparin   Injectable 5000 Unit(s) SubCutaneous every 8 hours  insulin lispro (ADMELOG) corrective regimen sliding scale   SubCutaneous three times a day before meals  insulin lispro Injectable (ADMELOG) 5 Unit(s) SubCutaneous three times a day before meals  lisinopril 20 milliGRAM(s) Oral daily  melatonin 10 milliGRAM(s) Oral at bedtime  methylPREDNISolone sodium succinate Injectable 40 milliGRAM(s) IV Push daily  metoprolol tartrate 50 milliGRAM(s) Oral two times a day  pantoprazole    Tablet 40 milliGRAM(s) Oral before breakfast  tiotropium 18 MICROgram(s) Capsule 1 Capsule(s) Inhalation daily      LABS: All Labs Reviewed:                        14.6   18.94 )-----------( 263      ( 03 Jul 2022 06:22 )             46.3     07-03    134<L>  |  90<L>  |  23<H>  ----------------------------<  147<H>  4.7   |  32  |  0.7    Ca    9.4      03 Jul 2022 06:22  Mg     2.0     07-03    TPro  7.0  /  Alb  3.9  /  TBili  1.3<H>  /  DBili  x   /  AST  22  /  ALT  32  /  AlkPhos  69  07-03          Blood Culture: 07-01 @ 10:14  Organism --  Gram Stain Blood -- Gram Stain --  Specimen Source .Body Fluid None  Culture-Blood --        Radiology: reviewed

## 2022-07-04 NOTE — PROGRESS NOTE ADULT - ASSESSMENT
67-year-old male with  PMHx of CAD/PVD s/p PCI in 2009 x2 stents (f/u with Dr. Bucio), DM, HLD, HTN, COPD, anxiety, and smoker 50 pack years s/p LLE femoral to above knee popliteal artery bypass with PTFE presents emergency department complaining of generalized weakness and shortness of breath for the past 5 days.       #Generalized Weakness 2/2 pericardial effusion: CT chest +L. upper lobe paramediastinal mass-like consolidation, pericardial effusion measuring 2.8cm    - Afebrile, WBC 12K   - No signs fluid overload on PE  -TSH WNL  - S/P pericardiocentesis: drained 500cc yellow serous fluid will keep on suction.   - Pt recc- no skilled PT need  - Monitor pericardial drain output  - Strip drain PRN for clotting  - IS encouraged  - Ambulate as tolerated    #Microcytic Anemia  - Iron studies orderd on the 28th -WNL  - monitor CBC     #COPD  * expiratory wheeze on exam , denies any productive cough but endorses worsening sob  - start Duonebs and symbicort       #HTN (hypertension)  *home meds : lisinopril/ HCTZ 20mg - 12.5mg   - c/w lisinopril 20mg and HCTZ 12.5mg daily  - Monitor BP     #Diabetes  *home meds: 850mg metformin  - Monitor FS (gOAL 140-180)  - Start ISS  - Check A1C: 8.4    #Dyslipidemia  *patient is not taking stain per pharmacy   - Start atorvastatin 40mg daily   - low HDL/high LDL    #CAD x 2 stents  *patient was previously on metoprolol 50mg BID but has not refilled since January per pharmacy   - will start Metroprolol 50mg BID , atorvastatin 40mg daily       #Misc  - DVT Prophylaxis: none  - Diet: DASH/ carbohydrate consistant  - GI Prophylaxis: Protonix  - Activity: as tolerated  - IV Fluids: none  - Code Status: full    Dispo: Acute

## 2022-07-04 NOTE — PROGRESS NOTE ADULT - SUBJECTIVE AND OBJECTIVE BOX
OVERNIGHT EVENTS: Pt seen and examined at bed side KIERA O/N.     VITAL SIGNS:  Vital Signs Last 24 Hrs  T(C): 36 (02 Jul 2022 05:06), Max: 36.5 (01 Jul 2022 20:42)  T(F): 96.8 (02 Jul 2022 05:06), Max: 97.7 (01 Jul 2022 20:42)  HR: 129 (02 Jul 2022 05:06) (90 - 129)  BP: 148/70 (02 Jul 2022 05:06) (113/56 - 148/70)  BP(mean): --  RR: 17 (02 Jul 2022 05:06) (17 - 20)  SpO2: 95% (02 Jul 2022 05:06) (92% - 95%)    PHYSICAL EXAM:    General: Well developed, well nourished, states pain is still present but better than post operation.   HEENT: NC/AT; PERRL, anicteric sclera; MMM  Neck: supple  Respiratory: CTA B/L; no W/R/R  Gastrointestinal: soft, NT/ND; +BSx4  Extremities: WWP; no edema, clubbing or cyanosis  Vascular: 2+ radial, DP/PT pulses B/L    MEDICATIONS:  MEDICATIONS  (STANDING):  albuterol/ipratropium for Nebulization 3 milliLiter(s) Nebulizer every 6 hours  atorvastatin 40 milliGRAM(s) Oral at bedtime  budesonide 160 MICROgram(s)/formoterol 4.5 MICROgram(s) Inhaler 2 Puff(s) Inhalation two times a day  dextrose 5%. 1000 milliLiter(s) (50 mL/Hr) IV Continuous <Continuous>  dextrose 5%. 1000 milliLiter(s) (100 mL/Hr) IV Continuous <Continuous>  dextrose 50% Injectable 25 Gram(s) IV Push once  dextrose 50% Injectable 12.5 Gram(s) IV Push once  dextrose 50% Injectable 25 Gram(s) IV Push once  glucagon  Injectable 1 milliGRAM(s) IntraMuscular once  insulin lispro (ADMELOG) corrective regimen sliding scale   SubCutaneous three times a day before meals  insulin lispro Injectable (ADMELOG) 5 Unit(s) SubCutaneous three times a day before meals  lisinopril 20 milliGRAM(s) Oral daily  melatonin 10 milliGRAM(s) Oral at bedtime  methylPREDNISolone sodium succinate Injectable 40 milliGRAM(s) IV Push daily  metoprolol tartrate 50 milliGRAM(s) Oral two times a day  pantoprazole    Tablet 40 milliGRAM(s) Oral before breakfast  tiotropium 18 MICROgram(s) Capsule 1 Capsule(s) Inhalation daily    MEDICATIONS  (PRN):  acetaminophen     Tablet .. 650 milliGRAM(s) Oral every 6 hours PRN Temp greater or equal to 38C (100.4F), Mild Pain (1 - 3)  aluminum hydroxide/magnesium hydroxide/simethicone Suspension 30 milliLiter(s) Oral every 4 hours PRN Dyspepsia  dextrose Oral Gel 15 Gram(s) Oral once PRN Blood Glucose LESS THAN 70 milliGRAM(s)/deciliter  morphine  - Injectable 2 milliGRAM(s) IV Push every 6 hours PRN Severe Pain (7 - 10)  ondansetron Injectable 4 milliGRAM(s) IV Push every 8 hours PRN Nausea and/or Vomiting  oxycodone    5 mG/acetaminophen 325 mG 1 Tablet(s) Oral every 4 hours PRN Moderate Pain (4 - 6)      ALLERGIES:  Allergies    No Known Allergies    Intolerances        LABS:                        14.1   14.10 )-----------( 245      ( 01 Jul 2022 06:23 )             45.1     07-01    135  |  90<L>  |  21<H>  ----------------------------<  135<H>  4.8   |  32  |  0.8    Ca    9.9      01 Jul 2022 06:23  Phos  4.6     06-30  Mg     2.2     07-01    TPro  7.6  /  Alb  4.4  /  TBili  1.1  /  DBili  x   /  AST  36  /  ALT  60<H>  /  AlkPhos  86  07-01    PT/INR - ( 30 Jun 2022 22:20 )   PT: 13.70 sec;   INR: 1.19 ratio         PTT - ( 30 Jun 2022 22:20 )  PTT:29.0 sec    CAPILLARY BLOOD GLUCOSE      POCT Blood Glucose.: 283 mg/dL (02 Jul 2022 11:16)      RADIOLOGY & ADDITIONAL TESTS: Reviewed.    PLAN:

## 2022-07-04 NOTE — PROGRESS NOTE ADULT - ATTENDING COMMENTS
67-year-old male with  PMHx of CAD/PVD s/p PCI in 2009 x2 stents (f/u with Dr. Bucio), DM, HLD, HTN, COPD, anxiety, and smoker 50 pack years s/p LLE femoral to above knee popliteal artery bypass with PTFE presents emergency department complaining of generalized weakness for the past 5 days.  Reports COCHRAN since the past few weeks.     #Generalized Weakness, COCHRAN:  #Possible malignant nodules on CT Chest  CRP elevated  CT Chest 06/28: PARISH masslike consolidation and patchy left lung opacities with bulky intrathoracic and right supraclavicular lymphadenopathy Suspicious of neoplastic process. Moderate left pleural effusion. large pericardial effusion  - Pulm planning Bronchoscopy/EBUS Bx on 7/5    #Moderate pericardial effusion:   May be malignant?  - CT surgery did Pericardiocentesis on 7/1.  f/u fluid labs/cytology.   Pericardial drain removed 7/3  Morphine 2Q6PRN for surgery site pain.     #Microcytic Anemia  Ferritin high, possible AoCD  - monitor CBC     #COPD:  stable. On Steroids since 6/28. MAy d/c steroids 7/3  Only mild wheezing on exam.   Duoneb PRN.    #HTN (hypertension)  *home meds : lisinopril/ HCTZ 20mg - 12.5mg   - c/w lisinopril 20mg and HCTZ 12.5mg daily  - Monitor BP     #Diabetes  *home meds: 850mg metformin  - Monitor FS (gOAL 140-180)  - Lispro 5 TIDAC + ISS  - Check A1C     #CAD x 2 stents  #HTN/HLD  - Cont metoprolol 50 BID  - Cont HCTZ 12.5mg qD  - Cont atorvastatin 40mg qHs  - Cont lisinopril 20mg qD    #Sinus Tachycardia:  @120s on 7/2. Better after pain meds.     #Misc  - DVT Prophylaxis: Lovenox 40 QD,. Hold on Tuesday morning for procedure.     #Progress Note Handoff  Pending (specify): pulm bx for eval for malignant nodules  Family discussion: d/w pt regarding CT Chest findings  Disposition: Home .

## 2022-07-05 NOTE — PROGRESS NOTE ADULT - ASSESSMENT
IMPRESSION:    PARISH Masslike Consolidation - likely malignant  Bilateral Solid Nodules  Mediastinal Lymphadenopathy  Left Pleural Effusion  Large Pericardial Effusion  Heavy smoker - COPD     PLAN:    CT Chest reviewed, findings suspicious for underlying malignancy   Large mediastinal adenopathy and left hilar mass - Plan for EBUS-FNA today   S/P pericardiocentesis with 500cc removed - Likely malignant - F/U cytology results  Keep O2 saturation more than 88%, currently on room air  Continue symbicort BID, add spiriva for COPD; off systemic steroids and no wheezing  Pharmacological DVT ppx; early ambulation   He will need outpatient pulm and hem onc follow up   Case discussed at length with the patient   Will follow

## 2022-07-05 NOTE — PROGRESS NOTE ADULT - ATTENDING COMMENTS
67-year-old male with  PMHx of CAD/PVD s/p PCI in 2009 x2 stents (f/u with Dr. Bucio), DM, HLD, HTN, COPD, anxiety, and smoker 50 pack years s/p LLE femoral to above knee popliteal artery bypass with PTFE presents emergency department complaining of generalized weakness for the past 5 days.  Reports COCHRAN since the past few weeks.     #Generalized Weakness, COCHRAN:  #Possible malignant nodules on CT Chest  CRP elevated  CT Chest 06/28: PARISH masslike consolidation and patchy left lung opacities with bulky intrathoracic and right supraclavicular lymphadenopathy Suspicious of neoplastic process. Moderate left pleural effusion. large pericardial effusion  - Pulm did Bronchoscopy/EBUS Bx on 7/5. f/u Pulm on Dispo.   Possible d/c on 7/6 and f/u results outpatient?   Will need an outpatient appointment with Oncology if biopsy results malignant.     #Moderate pericardial effusion:   May be malignant?  - CT surgery did Pericardiocentesis on 7/1.  Pericardial drain removed 7/3  Apparently Cytology sample was not sent from the procedure.     f/u Lung mass biopsy as above.     #Microcytic Anemia  Ferritin high, possible AoCD  - monitor CBC     #COPD:  stable. On Steroids since 6/28. d/johanna steroids 7/3  Duoneb PRN.    #HTN (hypertension)  *home meds : lisinopril/ HCTZ 20mg - 12.5mg   - c/w lisinopril 20mg and HCTZ 12.5mg daily  - Monitor BP     #Diabetes  *home meds: 850mg metformin  - Monitor FS (gOAL 140-180)  - Lispro 5 TIDAC + ISS  - Check A1C     #CAD x 2 stents  #HTN/HLD  - Cont metoprolol 50 BID  - Cont HCTZ 12.5mg qD  - Cont atorvastatin 40mg qHs  - Cont lisinopril 20mg qD    #Sinus Tachycardia:  @120s on 7/2. Better after pain meds.     #Misc  - DVT Prophylaxis: Lovenox 40 QD,.     #Progress Note Handoff  Pending (specify): pulm bx for eval for malignant nodules  Family discussion: d/w pt regarding CT Chest findings  Disposition: Home . 67-year-old male with  PMHx of CAD/PVD s/p PCI in 2009 x2 stents (f/u with Dr. Bucio), DM, HLD, HTN, COPD, anxiety, and smoker 50 pack years s/p LLE femoral to above knee popliteal artery bypass with PTFE presents emergency department complaining of generalized weakness for the past 5 days.  Reports COCHRAN since the past few weeks.     #Generalized Weakness, COCHRAN:  #Possible malignant nodules on CT Chest  CRP elevated  CT Chest 06/28: PARISH masslike consolidation and patchy left lung opacities with bulky intrathoracic and right supraclavicular lymphadenopathy Suspicious of neoplastic process. Moderate left pleural effusion. large pericardial effusion  - Pulm did Bronchoscopy/EBUS Bx on 7/5. Post procedure CXR WNL.   f/u Pulm on Dispo.   Possible d/c on 7/6 and f/u results outpatient?   Will need an outpatient appointment with Oncology if biopsy results malignant.     #Moderate pericardial effusion:   May be malignant?  - CT surgery did Pericardiocentesis on 7/1.  Pericardial drain removed 7/3  Apparently Cytology sample was not sent from the procedure.     f/u Lung mass biopsy as above.     #Microcytic Anemia  Ferritin high, possible AoCD  - monitor CBC     #COPD:  stable. On Steroids since 6/28. d/johanna steroids 7/3  Duoneb PRN.    #HTN (hypertension)  *home meds : lisinopril/ HCTZ 20mg - 12.5mg   - c/w lisinopril 20mg and HCTZ 12.5mg daily  - Monitor BP     #Diabetes  *home meds: 850mg metformin  - Monitor FS (gOAL 140-180)  - Lispro 5 TIDAC + ISS  - Check A1C     #CAD x 2 stents  #HTN/HLD  - Cont metoprolol 50 BID  - Cont HCTZ 12.5mg qD  - Cont atorvastatin 40mg qHs  - Cont lisinopril 20mg qD    #Sinus Tachycardia:  @120s on 7/2. Better after pain meds.     #Misc  - DVT Prophylaxis: Lovenox 40 QD,.     #Progress Note Handoff  Pending (specify): pulm bx for eval for malignant nodules  Family discussion: d/w pt regarding CT Chest findings  Disposition: Home .

## 2022-07-05 NOTE — PROGRESS NOTE ADULT - ASSESSMENT
67-year-old male with  PMHx of CAD/PVD s/p PCI in 2009 x2 stents (f/u with Dr. Bucio), DM, HLD, HTN, COPD, anxiety, and smoker 50 pack years s/p LLE femoral to above knee popliteal artery bypass with PTFE presents emergency department complaining of generalized weakness and shortness of breath for 5 days. Going for procedure today.       #Generalized Weakness 2/2 pericardial effusion: CT chest +L. upper lobe paramediastinal mass-like consolidation, pericardial effusion measuring 2.8cm    - Afebrile, WBC 12K   - No signs fluid overload on PE  -TSH WNL  - S/P pericardiocentesis: drained 500cc yellow serous fluid will keep on suction.   - Pt recc- no skilled PT need  - Strip drain PRN for clotting  - IS encouraged  - Ambulate as tolerated    #Microcytic Anemia  - Iron studies orderd on the 28th -WNL  - monitor CBC     #COPD  * expiratory wheeze on exam , denies any productive cough but endorses worsening sob  - start Duonebs and symbicort       #HTN (hypertension)  *home meds : lisinopril/ HCTZ 20mg - 12.5mg   - c/w lisinopril 20mg and HCTZ 12.5mg daily  - Monitor BP     #Diabetes  *home meds: 850mg metformin  - Monitor FS (gOAL 140-180)  - Start ISS  - Check A1C: 8.4    #Dyslipidemia  *patient is not taking stain per pharmacy   - Start atorvastatin 40mg daily   - low HDL/high LDL    #CAD x 2 stents  *patient was previously on metoprolol 50mg BID but has not refilled since January per pharmacy   - will start Metroprolol 50mg BID , atorvastatin 40mg daily       #Misc  - DVT Prophylaxis: none  - Diet: DASH/ carbohydrate consistant  - GI Prophylaxis: Protonix  - Activity: as tolerated  - IV Fluids: none  - Code Status: full    Dispo: Acute

## 2022-07-05 NOTE — PROGRESS NOTE ADULT - SUBJECTIVE AND OBJECTIVE BOX
OVERNIGHT EVENTS: Pt seen and examined at bed side KIERA O/N/E. States that he is able to breath better since the procedure and s/p and chest pain is better tolerated today.  States that he is nervous about the procedure today and had questions about how long the procedure was going to be, how long until he was able to get rid of the dressing on the chest, if he was able to get out of the hospital after the procedure.     VITAL SIGNS:  Vital Signs Last 24 Hrs  T(C): 36 (02 Jul 2022 05:06), Max: 36.5 (01 Jul 2022 20:42)  T(F): 96.8 (02 Jul 2022 05:06), Max: 97.7 (01 Jul 2022 20:42)  HR: 129 (02 Jul 2022 05:06) (90 - 129)  BP: 148/70 (02 Jul 2022 05:06) (113/56 - 148/70)  BP(mean): --  RR: 17 (02 Jul 2022 05:06) (17 - 20)  SpO2: 95% (02 Jul 2022 05:06) (92% - 95%)    PHYSICAL EXAM:    General: Well developed, well nourished, is able to tolerate pain.  HEENT: NC/AT; PERRL, anicteric sclera; MMM  Neck: supple  Respiratory: CTA B/L; no W/R/R  Gastrointestinal: soft, NT/ND; +BSx4  Extremities: WWP; no edema, clubbing or cyanosis  Vascular: 2+ radial, DP/PT pulses B/L    MEDICATIONS:  MEDICATIONS  (STANDING):  albuterol/ipratropium for Nebulization 3 milliLiter(s) Nebulizer every 6 hours  atorvastatin 40 milliGRAM(s) Oral at bedtime  budesonide 160 MICROgram(s)/formoterol 4.5 MICROgram(s) Inhaler 2 Puff(s) Inhalation two times a day  dextrose 5%. 1000 milliLiter(s) (50 mL/Hr) IV Continuous <Continuous>  dextrose 5%. 1000 milliLiter(s) (100 mL/Hr) IV Continuous <Continuous>  dextrose 50% Injectable 25 Gram(s) IV Push once  dextrose 50% Injectable 12.5 Gram(s) IV Push once  dextrose 50% Injectable 25 Gram(s) IV Push once  glucagon  Injectable 1 milliGRAM(s) IntraMuscular once  insulin lispro (ADMELOG) corrective regimen sliding scale   SubCutaneous three times a day before meals  insulin lispro Injectable (ADMELOG) 5 Unit(s) SubCutaneous three times a day before meals  lisinopril 20 milliGRAM(s) Oral daily  melatonin 10 milliGRAM(s) Oral at bedtime  methylPREDNISolone sodium succinate Injectable 40 milliGRAM(s) IV Push daily  metoprolol tartrate 50 milliGRAM(s) Oral two times a day  pantoprazole    Tablet 40 milliGRAM(s) Oral before breakfast  tiotropium 18 MICROgram(s) Capsule 1 Capsule(s) Inhalation daily    MEDICATIONS  (PRN):  acetaminophen     Tablet .. 650 milliGRAM(s) Oral every 6 hours PRN Temp greater or equal to 38C (100.4F), Mild Pain (1 - 3)  aluminum hydroxide/magnesium hydroxide/simethicone Suspension 30 milliLiter(s) Oral every 4 hours PRN Dyspepsia  dextrose Oral Gel 15 Gram(s) Oral once PRN Blood Glucose LESS THAN 70 milliGRAM(s)/deciliter  morphine  - Injectable 2 milliGRAM(s) IV Push every 6 hours PRN Severe Pain (7 - 10)  ondansetron Injectable 4 milliGRAM(s) IV Push every 8 hours PRN Nausea and/or Vomiting  oxycodone    5 mG/acetaminophen 325 mG 1 Tablet(s) Oral every 4 hours PRN Moderate Pain (4 - 6)      ALLERGIES:  Allergies    No Known Allergies    Intolerances        LABS:                        14.1   14.10 )-----------( 245      ( 01 Jul 2022 06:23 )             45.1     07-01    135  |  90<L>  |  21<H>  ----------------------------<  135<H>  4.8   |  32  |  0.8    Ca    9.9      01 Jul 2022 06:23  Phos  4.6     06-30  Mg     2.2     07-01    TPro  7.6  /  Alb  4.4  /  TBili  1.1  /  DBili  x   /  AST  36  /  ALT  60<H>  /  AlkPhos  86  07-01    PT/INR - ( 30 Jun 2022 22:20 )   PT: 13.70 sec;   INR: 1.19 ratio         PTT - ( 30 Jun 2022 22:20 )  PTT:29.0 sec    CAPILLARY BLOOD GLUCOSE      POCT Blood Glucose.: 283 mg/dL (02 Jul 2022 11:16)      RADIOLOGY & ADDITIONAL TESTS: Reviewed.    PLAN:

## 2022-07-05 NOTE — PROGRESS NOTE ADULT - SUBJECTIVE AND OBJECTIVE BOX
Patient is a 67y old  Male who presents with a chief complaint of Generalized weakness and SOB (05 Jul 2022 07:33)        SUBJECTIVE:    Remains on room air   S/p pericardiocentesis/drain with 500cc removed   Cytopathology results not yet available  Plan for EBUS-FNA of mediastinal adenopathy and mass today       REVIEW OF SYSTEMS:  See HPI    PHYSICAL EXAM  Vital Signs Last 24 Hrs  T(C): 36.4 (05 Jul 2022 05:27), Max: 36.8 (04 Jul 2022 20:00)  T(F): 97.6 (05 Jul 2022 05:27), Max: 98.2 (04 Jul 2022 20:00)  HR: 85 (05 Jul 2022 05:27) (78 - 85)  BP: 131/59 (05 Jul 2022 05:27) (113/57 - 140/50)  BP(mean): --  RR: 19 (05 Jul 2022 05:27) (18 - 19)  SpO2: 94% (05 Jul 2022 05:27) (94% - 94%)    General: In NAD  HEENT: JAYASHREE               Lymphatic system: No Cervical LN    Respiratory: Nico BS, clear, on room air   Cardiovascular: Regular  Gastrointestinal: Soft. + BS  Musculoskeletal: No clubbing.  moves all extremities.  Full range of motion    Skin: Warm.  Intact  Neurological: No motor or sensory deficit        LABS:                          14.9   17.46 )-----------( 251      ( 04 Jul 2022 07:31 )             48.7                                               07-04    134<L>  |  91<L>  |  26<H>  ----------------------------<  145<H>  5.1<H>   |  34<H>  |  0.8    Ca    9.8      04 Jul 2022 07:31  Mg     2.2     07-04    TPro  7.3  /  Alb  4.2  /  TBili  0.9  /  DBili  x   /  AST  29  /  ALT  38  /  AlkPhos  88  07-04      PT/INR - ( 04 Jul 2022 11:37 )   PT: 12.90 sec;   INR: 1.12 ratio         PTT - ( 04 Jul 2022 11:37 )  PTT:32.8 sec                                                                                     LIVER FUNCTIONS - ( 04 Jul 2022 07:31 )  Alb: 4.2 g/dL / Pro: 7.3 g/dL / ALK PHOS: 88 U/L / ALT: 38 U/L / AST: 29 U/L / GGT: x                                                                                                MEDICATIONS  (STANDING):  albuterol/ipratropium for Nebulization 3 milliLiter(s) Nebulizer every 6 hours  atorvastatin 40 milliGRAM(s) Oral at bedtime  budesonide 160 MICROgram(s)/formoterol 4.5 MICROgram(s) Inhaler 2 Puff(s) Inhalation two times a day  dextrose 5%. 1000 milliLiter(s) (50 mL/Hr) IV Continuous <Continuous>  dextrose 5%. 1000 milliLiter(s) (100 mL/Hr) IV Continuous <Continuous>  dextrose 50% Injectable 25 Gram(s) IV Push once  dextrose 50% Injectable 12.5 Gram(s) IV Push once  dextrose 50% Injectable 25 Gram(s) IV Push once  glucagon  Injectable 1 milliGRAM(s) IntraMuscular once  insulin lispro (ADMELOG) corrective regimen sliding scale   SubCutaneous three times a day before meals  insulin lispro Injectable (ADMELOG) 5 Unit(s) SubCutaneous three times a day before meals  lisinopril 20 milliGRAM(s) Oral daily  melatonin 10 milliGRAM(s) Oral at bedtime  metoprolol tartrate 50 milliGRAM(s) Oral two times a day  pantoprazole    Tablet 40 milliGRAM(s) Oral before breakfast  tiotropium 18 MICROgram(s) Capsule 1 Capsule(s) Inhalation daily    MEDICATIONS  (PRN):  acetaminophen     Tablet .. 650 milliGRAM(s) Oral every 6 hours PRN Temp greater or equal to 38C (100.4F), Mild Pain (1 - 3)  aluminum hydroxide/magnesium hydroxide/simethicone Suspension 30 milliLiter(s) Oral every 4 hours PRN Dyspepsia  dextrose Oral Gel 15 Gram(s) Oral once PRN Blood Glucose LESS THAN 70 milliGRAM(s)/deciliter  morphine  - Injectable 2 milliGRAM(s) IV Push every 6 hours PRN Severe Pain (7 - 10)  ondansetron Injectable 4 milliGRAM(s) IV Push every 8 hours PRN Nausea and/or Vomiting  oxycodone    5 mG/acetaminophen 325 mG 1 Tablet(s) Oral every 4 hours PRN Moderate Pain (4 - 6)

## 2022-07-05 NOTE — CHART NOTE - NSCHARTNOTEFT_GEN_A_CORE
PACU ANESTHESIA ADMISSION NOTE      Procedure: Bronchoscopy      Post op diagnosis:      ____  Intubated  TV:______       Rate: ______      FiO2: ______    _X___  Patent Airway    __X__  Full return of protective reflexes    ____  Full recovery from anesthesia / back to baseline status    Vitals:  T: 97.5F  HR: 98  BP: 123/55  RR: 22  SpO2: 96%    Mental Status:  __X__ Awake   _____ Alert   _____ Drowsy   _____ Sedated    Nausea/Vomiting:  ___X_ NO  ______Yes,   See Post - Op Orders          Pain Scale (0-10):  _____    Treatment: ____ None    X___ See Post - Op/PCA Orders    Post - Operative Fluids:   ____ Oral   __X__ See Post - Op Orders    Plan: Discharge:   ____Home       X_____Floor     _____Critical Care    _____  Other:_________________    Comments: NO anesthetic related complications noted. Pt. transported to PACU, report endorsed to RN

## 2022-07-06 NOTE — DISCHARGE NOTE NURSING/CASE MANAGEMENT/SOCIAL WORK - NSDCPEFALRISK_GEN_ALL_CORE
For information on Fall & Injury Prevention, visit: [email or web address] OR  [email or web address] OR  https://www.cdc.gov/steadi/patient.html

## 2022-07-06 NOTE — PROGRESS NOTE ADULT - ASSESSMENT
IMPRESSION:    PARISH Masslike Consolidation - likely malignant  Bilateral Solid Nodules  Mediastinal Lymphadenopathy  Left Pleural Effusion  Large Pericardial Effusion  Heavy smoker - COPD     PLAN:    S/P EBUS FNA of the subcarinal lymph node and the left hilar mass yesterday  S/P pericardiocentesis with 500cc removed - Likely malignant - Clarify if cytology results were sent   Keep O2 saturation more than 88%, currently on room air  Continue symbicort BID, add spiriva for COPD; off systemic steroids and no wheezing  Pharmacological DVT ppx; early ambulation   He will need outpatient pulm for PFTs and hem onc follow up once discharged  Case discussed at length with the patient and his family

## 2022-07-06 NOTE — PROGRESS NOTE ADULT - REASON FOR ADMISSION
pericardial effusion
pericardial effusion
Generalized weakness and SOB

## 2022-07-06 NOTE — PROGRESS NOTE ADULT - PROVIDER SPECIALTY LIST ADULT
Thoracic Surgery
Thoracic Surgery
CT Surgery
Hospitalist
Internal Medicine
Internal Medicine
Pulmonology
Pulmonology
Thoracic Surgery
Internal Medicine
Pulmonology

## 2022-07-06 NOTE — DISCHARGE NOTE NURSING/CASE MANAGEMENT/SOCIAL WORK - PATIENT PORTAL LINK FT
You can access the FollowMyHealth Patient Portal offered by [facility] by registering at the following website: [email or web address]. By joining [facility]’s FollowMyHealth portal, you will also be able to view your health information using other applications (apps) compatible with our system.

## 2022-07-06 NOTE — PROGRESS NOTE ADULT - SUBJECTIVE AND OBJECTIVE BOX
PORFIRIO HERRERA  [facility] T2-3A 022 B ([facility] T2-3A)      Patient was evaluated and examined  by bedside, no active complains      REVIEW OF SYSTEMS:  please see pertinent positives mentioned above, all other 12 ROS negative      T(C): , Max: 36.7 (07-06-22 @ 05:08)  HR: 74 (07-06-22 @ 12:24)  BP: 116/57 (07-06-22 @ 12:24)  RR: 18 (07-06-22 @ 05:08)  SpO2: 93% (07-06-22 @ 09:42)  CAPILLARY BLOOD GLUCOSE      POCT Blood Glucose.: 214 mg/dL (06 Jul 2022 11:16)  POCT Blood Glucose.: 196 mg/dL (06 Jul 2022 07:35)  POCT Blood Glucose.: 317 mg/dL (05 Jul 2022 21:08)  POCT Blood Glucose.: 342 mg/dL (05 Jul 2022 21:07)  POCT Blood Glucose.: 187 mg/dL (05 Jul 2022 16:22)      PHYSICAL EXAM:  General: NAD, AAOX3, patient is sitting  comfortably in bed  HEENT: AT, NC, Supple, NO JVD, NO CB  Lungs: CTA B/L, no wheezing, no rhonchi  CVS: normal S1, S2, RRR, NO M/G/R  Abdomen: soft, bowel sounds present, non-tender, non-distended  Extremities: no edema, no clubbing, no cyanosis, positive peripheral pulses b/l  Neuro: no acute focal neurological deficits  Skin: no rash, no ecchymosis      LAB  CBC  Date: 07-06-22 @ 11:58  Mean cell Wjqfbsnjkx61.3  Mean cell Hemoglobin Conc30.6  Mean cell Volum 63.0  Platelet count-Automate 211  RBC Count 6.89  Red Cell Distrib Width17.6  WBC Count13.72  % Albumin, Urine--  Hematocrit 43.4  Hemoglobin 13.3  CBC  Date: 07-04-22 @ 07:31  Mean cell Atizqsigkl68.4  Mean cell Hemoglobin Conc30.6  Mean cell Volum 63.5  Platelet count-Automate 251  RBC Count 7.67  Red Cell Distrib Width18.6  WBC Count17.46  % Albumin, Urine--  Hematocrit 48.7  Hemoglobin 14.9  CBC  Date: 07-03-22 @ 06:22  Mean cell Qoulexdooa07.5  Mean cell Hemoglobin Conc31.5  Mean cell Volum 61.9  Platelet count-Automate 263  RBC Count 7.48  Red Cell Distrib Width18.6  WBC Count18.94  % Albumin, Urine--  Hematocrit 46.3  Hemoglobin 14.6  CBC  Date: 07-01-22 @ 06:23  Mean cell Mucgcryazh63.5  Mean cell Hemoglobin Conc31.3  Mean cell Volum 62.5  Platelet count-Automate 245  RBC Count 7.22  Red Cell Distrib Width18.2  WBC Count14.10  % Albumin, Urine--  Hematocrit 45.1  Hemoglobin 14.1  CBC  Date: 06-30-22 @ 22:20  Mean cell Vfgsiiuwje12.8  Mean cell Hemoglobin Conc31.2  Mean cell Volum 63.4  Platelet count-Automate 253  RBC Count 6.97  Red Cell Distrib Width18.3  WBC Count14.91  % Albumin, Urine--  Hematocrit 44.2  Hemoglobin 13.8    [facility]  07-06-22 @ 11:58  Blood Gas Arterial-Calcium,Ionized--  Blood Urea Nitrogen, Serum 17 mg/dL [10 - 20]  Carbon Dioxide, Serum30 mmol/L [17 - 32]  Chloride, Serum91 mmol/L<L> [98 - 110]  Creatinie, Serum0.7 mg/dL [0.7 - 1.5]  Glucose, Tmtwk785 mg/dL<H> [70 - 99]  Potassium, Serum4.9 mmol/L [3.5 - 5.0] [Slighty Hemolyzed use with Caution]  Sodium, Serum 132 mmol/L<L> [135 - 146]  [facility]  07-05-22 @ 14:28  Blood Gas Arterial-Calcium,Ionized1.13 mmol/L<L> [1.15 - 1.33]  Blood Urea Nitrogen, Serum --  Carbon Dioxide, Serum--  Chloride, Serum--  Creatinie, Serum--  Glucose, Serum--  Potassium, Serum--  Sodium, Serum --  [facility]  07-04-22 @ 07:31  Blood Gas Arterial-Calcium,Ionized--  Blood Urea Nitrogen, Serum 26 mg/dL<H> [10 - 20]  Carbon Dioxide, Serum34 mmol/L<H> [17 - 32]  Chloride, Serum91 mmol/L<L> [98 - 110]  Creatinie, Serum0.8 mg/dL [0.7 - 1.5]  Glucose, Civqm397 mg/dL<H> [70 - 99]  Potassium, Serum5.1 mmol/L<H> [3.5 - 5.0]  Sodium, Serum 134 mmol/L<L> [135 - 146]  BMP  07-03-22 @ 06:22  Blood Gas Arterial-Calcium,Ionized--  Blood Urea Nitrogen, Serum 23 mg/dL<H> [10 - 20]  Carbon Dioxide, Serum32 mmol/L [17 - 32]  Chloride, Serum90 mmol/L<L> [98 - 110]  Creatinie, Serum0.7 mg/dL [0.7 - 1.5]  Glucose, Qetld886 mg/dL<H> [70 - 99]  Potassium, Serum4.7 mmol/L [3.5 - 5.0]  Sodium, Serum 134 mmol/L<L> [135 - 146]              Microbiology:    Culture - Acid Fast - Body Fluid w/Smear (collected 07-01-22 @ 10:14)  Source: .Body Fluid None  Preliminary Report (07-06-22 @ 15:04):    Culture is being performed.        Medications:  acetaminophen     Tablet .. 650 milliGRAM(s) Oral every 6 hours PRN  albuterol/ipratropium for Nebulization 3 milliLiter(s) Nebulizer every 6 hours  aluminum hydroxide/magnesium hydroxide/simethicone Suspension 30 milliLiter(s) Oral every 4 hours PRN  atorvastatin 40 milliGRAM(s) Oral at bedtime  budesonide 160 MICROgram(s)/formoterol 4.5 MICROgram(s) Inhaler 2 Puff(s) Inhalation two times a day  dextrose 5%. 1000 milliLiter(s) IV Continuous <Continuous>  dextrose 5%. 1000 milliLiter(s) IV Continuous <Continuous>  dextrose 50% Injectable 25 Gram(s) IV Push once  dextrose 50% Injectable 12.5 Gram(s) IV Push once  dextrose 50% Injectable 25 Gram(s) IV Push once  dextrose Oral Gel 15 Gram(s) Oral once PRN  enoxaparin Injectable 40 milliGRAM(s) SubCutaneous every 24 hours  furosemide    Tablet 20 milliGRAM(s) Oral daily  glucagon  Injectable 1 milliGRAM(s) IntraMuscular once  insulin lispro (ADMELOG) corrective regimen sliding scale   SubCutaneous three times a day before meals  insulin lispro Injectable (ADMELOG) 5 Unit(s) SubCutaneous three times a day before meals  lisinopril 20 milliGRAM(s) Oral daily  melatonin 10 milliGRAM(s) Oral at bedtime  metoprolol tartrate 50 milliGRAM(s) Oral two times a day  morphine  - Injectable 2 milliGRAM(s) IV Push every 6 hours PRN  ondansetron Injectable 4 milliGRAM(s) IV Push every 8 hours PRN  oxycodone    5 mG/acetaminophen 325 mG 1 Tablet(s) Oral every 4 hours PRN  pantoprazole    Tablet 40 milliGRAM(s) Oral before breakfast  tiotropium 18 MICROgram(s) Capsule 1 Capsule(s) Inhalation daily        Assessment and Plan:  67-year-old male with  PMHx of CAD/PVD s/p PCI in 2009 x2 stents (f/u with Dr. Bucio), DM, HLD, HTN, COPD, anxiety, and smoker 50 pack years s/p LLE femoral to above knee popliteal artery bypass with PTFE presents emergency department complaining of generalized weakness for the past 5 days.  Reports COCHRAN since the past few weeks.     #Generalized Weakness, COCHRAN:  #Possible malignant nodules on CT Chest  CRP elevated  CT Chest 06/28: PARISH masslike consolidation and patchy left lung opacities with bulky intrathoracic and right supraclavicular lymphadenopathy Suspicious of neoplastic process. Moderate left pleural effusion. large pericardial effusion  - Pulm did Bronchoscopy/EBUS Bx on 7/5. Post procedure CXR WNL.   -Patient was instructed to f/up with outpatient Pulmonary specialist to review the results of lung tissue biopsy.  -smoking cessation counseling d/w pt.    #Moderate pericardial effusion:   May be malignant?  - CT surgery did Pericardiocentesis on 7/1.  Pericardial drain removed 7/3  Apparently Cytology sample was not sent from the procedure.   - Newly decreased EF on 2d echo - 26%- consulted Cardiology team, continue Ace, diuretics, B-blockers, statins, f/up Cardiology when pt. should have next 2d echo repeated.        #Microcytic Anemia, normal iron level  Ferritin high  - monitor outpatient CBC     #COPD:  stable. On Steroids since 6/28. d/johanna steroids 7/3  Duoneb PRN.    #HTN (hypertension)  *home meds : lisinopril/ HCTZ 20mg - 12.5mg   - c/w lisinopril 20mg and HCTZ 12.5mg daily  - Metoprolol 100 mg po once daily    #Diabetes- uncontrolled   *home meds: 1000 mg metformin po twice daily, added on Januvia 100 mg po once daily  - outpatient PCP f/up for further DM management, Carb. controlled diet compliance was d/w pt.      #CAD x 2 stents  #HTN/HLD  - Cont metoprolol 50 BID  - Cont HCTZ 12.5mg qD  - Cont atorvastatin 40mg qHs  - Cont lisinopril 20mg qD   DVT Prophylaxis: Lovenox 40 QD,.     #Progress Note Handoff  Pending (specify): f/up Cardiology for newly reduced LVEF, than d/c planning home   Family discussion: all abnormal results d/w pt. by bedside   Disposition: Home possibly today post Cardiology specialist evaluation.     Total time spent to complete patient's bedside assessment, review medical chart, discuss medical plan of care with covering medical team was more than 35 minutes

## 2022-07-06 NOTE — PROGRESS NOTE ADULT - SUBJECTIVE AND OBJECTIVE BOX
Patient is a 67y old  Male who presents with a chief complaint of Generalized weakness and SOB (05 Jul 2022 07:33)        SUBJECTIVE:    EBUS done yesterday  Path results obviously pending   Patient on room air       REVIEW OF SYSTEMS:  See HPI    PHYSICAL EXAM  Vital Signs Last 24 Hrs  T(C): 36.2 (06 Jul 2022 12:24), Max: 36.7 (06 Jul 2022 05:08)  T(F): 97.2 (06 Jul 2022 12:24), Max: 98 (06 Jul 2022 05:08)  HR: 74 (06 Jul 2022 12:24) (74 - 90)  BP: 116/57 (06 Jul 2022 12:24) (101/51 - 116/57)  BP(mean): --  RR: 18 (06 Jul 2022 05:08) (18 - 18)  SpO2: 93% (06 Jul 2022 09:42) (91% - 93%)    General: In NAD  HEENT: JAYASHREE               Lymphatic system: No Cervical LN    Respiratory: Nico BS  Cardiovascular: Regular  Gastrointestinal: Soft. + BS  Musculoskeletal: No clubbing.  moves all extremities.  Full range of motion    Skin: Warm.  Intact  Neurological: No motor or sensory deficit        LABS:                          13.3   13.72 )-----------( 211      ( 06 Jul 2022 11:58 )             43.4                                               07-06    132<L>  |  91<L>  |  17  ----------------------------<  211<H>  4.9   |  30  |  0.7    Ca    9.2      06 Jul 2022 11:58  Mg     2.0     07-06    TPro  6.7  /  Alb  4.0  /  TBili  0.6  /  DBili  x   /  AST  45<H>  /  ALT  45<H>  /  AlkPhos  92  07-06                                                                                           LIVER FUNCTIONS - ( 06 Jul 2022 11:58 )  Alb: 4.0 g/dL / Pro: 6.7 g/dL / ALK PHOS: 92 U/L / ALT: 45 U/L / AST: 45 U/L / GGT: x                                                                                            ABG - ( 05 Jul 2022 14:28 )  pH, Arterial: 7.44  pH, Blood: x     /  pCO2: 52    /  pO2: 90    / HCO3: 35    / Base Excess: 9.4   /  SaO2: 98.2                MEDICATIONS  (STANDING):  albuterol/ipratropium for Nebulization 3 milliLiter(s) Nebulizer every 6 hours  atorvastatin 40 milliGRAM(s) Oral at bedtime  budesonide 160 MICROgram(s)/formoterol 4.5 MICROgram(s) Inhaler 2 Puff(s) Inhalation two times a day  dextrose 5%. 1000 milliLiter(s) (50 mL/Hr) IV Continuous <Continuous>  dextrose 5%. 1000 milliLiter(s) (100 mL/Hr) IV Continuous <Continuous>  dextrose 50% Injectable 25 Gram(s) IV Push once  dextrose 50% Injectable 12.5 Gram(s) IV Push once  dextrose 50% Injectable 25 Gram(s) IV Push once  enoxaparin Injectable 40 milliGRAM(s) SubCutaneous every 24 hours  furosemide    Tablet 20 milliGRAM(s) Oral daily  glucagon  Injectable 1 milliGRAM(s) IntraMuscular once  insulin lispro (ADMELOG) corrective regimen sliding scale   SubCutaneous three times a day before meals  insulin lispro Injectable (ADMELOG) 5 Unit(s) SubCutaneous three times a day before meals  lisinopril 20 milliGRAM(s) Oral daily  melatonin 10 milliGRAM(s) Oral at bedtime  metoprolol tartrate 50 milliGRAM(s) Oral two times a day  pantoprazole    Tablet 40 milliGRAM(s) Oral before breakfast  tiotropium 18 MICROgram(s) Capsule 1 Capsule(s) Inhalation daily    MEDICATIONS  (PRN):  acetaminophen     Tablet .. 650 milliGRAM(s) Oral every 6 hours PRN Temp greater or equal to 38C (100.4F), Mild Pain (1 - 3)  aluminum hydroxide/magnesium hydroxide/simethicone Suspension 30 milliLiter(s) Oral every 4 hours PRN Dyspepsia  dextrose Oral Gel 15 Gram(s) Oral once PRN Blood Glucose LESS THAN 70 milliGRAM(s)/deciliter  morphine  - Injectable 2 milliGRAM(s) IV Push every 6 hours PRN Severe Pain (7 - 10)  ondansetron Injectable 4 milliGRAM(s) IV Push every 8 hours PRN Nausea and/or Vomiting  oxycodone    5 mG/acetaminophen 325 mG 1 Tablet(s) Oral every 4 hours PRN Moderate Pain (4 - 6)

## 2022-07-08 NOTE — CDI QUERY NOTE - NSCDIOTHERTXTBX_GEN_ALL_CORE_HH
CLINICAL INDICATORS    7/5 Consent to Operate form - procedure to be performed: bronchoscopy with endobronchial US, w/ possible transbronchial and fine needle aspiration biopsy.    7/5 Bronchoscopy Report: Procedure Performed: Bronchoscopy and EBUS... The bronchoscopy was introduced through the endotracheal tube and advanced through the vocal cords into the trachea... Lymph nodes in beth stations 7 were biopsied with 22G needle. Left hilar mass was also identified and biopsied...    7/6 Pulmonology Attending Progress Note: S/P EBUS FNA of the subcarinal lymph node and the left hilar mass yesterday…    7/6 Hospitalist Attending Progress Note: Possible malignant nodules on CT Chest… CT Chest 06/28: PARISH masslike consolidation and patchy left lung opacities with bulky intrathoracic and right supraclavicular lymphadenopathy Suspicious of neoplastic process. Moderate left pleural effusion. large pericardial effusion…    Based on your professional judgment and the clinical indicators, please clarify if the EBUS left hilar mass biopsy can be further specified as:    • Transbronchial biopsy of the left upper lung lobe mass  • Transbronchial fine needle aspiration biopsy of the left upper lung lobe mass  • Other (please specify):

## 2022-07-11 PROBLEM — Z87.891 EX-HEAVY CIGARETTE SMOKER (20-39 PER DAY): Status: ACTIVE | Noted: 2022-01-01

## 2022-07-11 PROBLEM — C34.90 LUNG CANCER: Status: ACTIVE | Noted: 2022-01-01

## 2022-07-11 NOTE — HISTORY OF PRESENT ILLNESS
[TextBox_4] : 67 year old man, ex heavy smoker, recently admitted to the hospital with shortness of breath, found to have a pericardial effusion with tamponnade s/p drainage with CTS, and CT chest showed a large left hilar mass with bulky adenopathy, s/p EBUS-FNA of the mass and lymph nodes with biopsies positive for carcinoma awaiting IHC staining and testing. HE is presenting for follow up. Will refer to oncology, check PET-CT and PFTs.

## 2022-07-12 NOTE — CHART NOTE - NSCHARTNOTEFT_GEN_A_CORE
This note is in response to the CDI query:     The patient underwent Endobronchial Ultrasound with transbronchial Fine needle aspiration of the left hilar mass and subcarinal lymph node on 7/6.

## 2022-07-19 NOTE — PATIENT PROFILE ADULT - NSPROMEDSPATCH_GEN_A_NUR
Patient's potassium is 2.8.  Patient takes 120 meq daily.  Patient instructed to take an extra 60 meq today per Dr. Claros  Patient verbalized understanding.    Patient will have repeat labs Wednesday and Friday this week.   
no
none

## 2022-07-21 NOTE — REVIEW OF SYSTEMS
[Fatigue] : fatigue [Recent Change In Weight] : ~T recent weight change [Negative] : Heme/Lymph [Fever] : no fever

## 2022-07-21 NOTE — ASSESSMENT
[FreeTextEntry1] : Impression\par Metastatic poorly differentiated non-small cell lung cancer lung cancer, stage IV with bilateral supraclavicular thoracic adenopathy, possible lymphangitic spread, likely liver and bone metastases and likley malignant pericaridal effusion s/p pericardiocentesis\par -PD-L1 Keytruda less than 1%\par -NGS pending\par -Performance status likely 1-2\par \par Plan\par -It was difficult to engage patient in the conversation and it took quite some time before he became calm.\par -He was adamant about not having chemotherapy\par -But I had an extensive discussion with them about possible treatment options\par -I explained that at this point his NGS is pending and if there is indeed a  mutation can recall for targeted therapy\par -PD-L1 is less than 1% single agent Keytruda may not be possible unless he is found to have high tumor mutation burden which has recently been demonstrated to show responses irrespective of PD-L1\par -Otherwise she will require chemoimmunotherapy or duel immunotherapy with opdivo and Ipi\par -The patient was not sure if he would want treatment if he is unable to obtain a pill I therefore explained that the supportive measures and hospice when time is reasonable\par -I did go over some of the possible side effects of treatment but explained at this point this is too broad\par -I suggested an MRI brain with IV contrast to complete staging but he declined\par -He has a second opinion coming up in [facility]\par -He asked that I call him with results of NGS and then we will proceed from there and I agreed\par \par

## 2022-07-21 NOTE — CONSULT LETTER
[Dear  ___] : Dear  [unfilled], [Consult Letter:] : I had the pleasure of evaluating your patient, [unfilled]. [Please see my note below.] : Please see my note below. [Consult Closing:] : Thank you very much for allowing me to participate in the care of this patient.  If you have any questions, please do not hesitate to contact me. [Sincerely,] : Sincerely, [FreeTextEntry3] : [place]

## 2022-07-21 NOTE — HISTORY OF PRESENT ILLNESS
[de-identified] : CC: I have lung cancer\par \par He is here at the request of Kevin Stephens\par \par He is here with his daughter and wife\par \par This is a 67 year old male with history of   CAD/PVD s/p PCI in 2009 x2 stents (f/u with Dr. Bucio), DM, HLD, HTN, COPD, anxiety, and smoker 50 pack years s/p s/p LLE \par femoral to above knee popliteal artery bypass. He quit smoking about 3 weeks ago after a recent admission to [facility] when he presented with weakenss in July 2022 and was found to have on CT chest  PARISH masslike consolidation and patchy left lung opacities with bulky intrathoracic and right supraclavicular lymphadenopathy Suspicious of\par neoplastic process. Moderate left pleural effusion. large pericardial effusion and had a  Pericardiocentesis on 7/1 and Pericardial drain removed 7/3\par Apparently Cytology sample was not sent from the procedure.    Newly decreased EF on 2d echo - 26%   Pulm did Bronchoscopy/EBUS Bx on 7/5.and supraclavicular node biopsy which showed \par \par Poorly differentiated non-small cell carcinoma, with extensive necrosis,\par see note.\par \par Immunohistochemistry results:\par Material:       Cell block 1A\par Population:    Tumor\par Positive:        Pancytokeratin, CK7 (focal), CK20 (focal)\par Negative:      CK5/6, p63, p40, TTF-1, Napsin A, chromogranin,\par synaptophysin, CD56\par These results support the above diagnosis.\par \par Note:  The focally positive CK7 and CK20 immunoprofile is not organ-\par specific regarding tumor origin.  With the history of a large left hilar\par mass and bulky adenopathy, this tumor is most likely a lung primary, but\par tumor origin from other sites cannot be completely excluded.  Clinical\par correlation required.\par See concurrent subcarinal lymph node EBUS-guided FNA report [id number].\par PDL-1 testing and next generation sequencing are pending and addendum\par report will follow.\par \par Poorly differentiated non-small cell carcinoma, with extensive necrosis,\par see note.\par \par Immunohistochemistry results:\par Material:       Cell block 1A\par Population:    Tumor\par Positive:        Pancytokeratin, CK7 (focal), CK20 (focal)\par Negative:      CK5/6, p63, p40, TTF-1, Napsin A, chromogranin,\par synaptophysin, CD56\par These results support the above diagnosis.\par \par Note:  The focally positive CK7 and CK20 immunoprofile is not organ-\par specific regarding tumor origin.  With the history of a large left hilar\par mass and bulky adenopathy, this tumor is most likely a lung primary, but\par tumor origin from other sites cannot be completely excluded.  Clinical\par correlation required.\par See concurrent subcarinal lymph node EBUS-guided FNA report [id number].\par PDL-1 testing and next generation sequencing are pending and addendum\par report will follow.\par \par RESULTS :\par [employer]  - [employer]\par (KEYTRUDA ?) PD-L1 Immunohistochemical Analysis\par \par PD-L1 IMMUNOHISTOCHEMICAL ANALYSIS\par Tumor Proportion Score:  <1% / Negative\par \par He also was noted to have microcytic anemia with high ferritin.\par \par He had a PET/CT scan in regional that showed 18 Jul 2022\par \par Hypermetabolic left upper lobe paramediastinal mass consistent with malignancy.  FDG avid left upper lobe pulmonary nodules, consistent with malignancy/metastatic disease, non-FDG-avid subcentimeter bilateral pulmonary nodules but lesions are below resolution of a PET, intralobular thickening suspicious for lymphangitic carcinomatosis, hypermetabolic supraclavicular and thoracic lymphadenopathy, FDG avid hepatic lobe hypodensity suspicious for hepatic metastases, focal hypermetabolic activity in the right seventh rib and right ilium bone without discrete lesions on CT highly suspicious for osseous metastases\par \par He is here  With his family to discuss options with his family to discuss options\par

## 2022-08-09 NOTE — ASSESSMENT
[FreeTextEntry1] : Impression\par Metastatic poorly differentiated non-small cell lung cancer lung cancer, stage IV with bilateral supraclavicular thoracic adenopathy, possible lymphangitic spread, likely liver and bone metastases and likley malignant pericaridal effusion s/p pericardiocentesis\par -PD-L1 Keytruda less than 1%\par -NGS pending no  mutaion\par -TMB high 17.3 gene path\par -Performance status likely 1-2\par \par Plan\par -We had a discussion discussion again regarding treatment options given that he is declining chemotherapy I explained his options can be a trial of Keytruda given that his tumor mutation burden is 17.3 and recent complication showed dated that he feels well and responds granted that the cutoff that was used was greater than 19 in that study for example another option we spoke about his dual immunotherapy with Opdivo and ipilimumab I explained that dual immunotherapy has higher adverse risks in regards to toxicity compared to single agent immunotherapy such as Keytruda.\par \par We went over the benefits of dual immunotherapy approach, I went over the data from [id number] trial,\par Among 373 patients with tumor PD-L1 expression <1 percent, the median OS with nivolumab plus ipilimumab was 17.2 months, versus 12.2 months with chemotherapy \par  The four-year OS rate with nivolumab plus ipilimumab versus chemotherapy was 24 versus 10 percent in this subset \par Additionally, in those with tumor PD-L1 expression <1 percent, OS was greater with nivolumab plus ipilimumab versus nivolumab plus chemotherapy, which was also evaluated in this study. The percentage of patients with grade 3 or 4 treatment-related adverse events in the overall population (which included patients with CA-C3-ftrezsygvl as well as XJ-H2-wdnjxerb tumors) was 33 percent with nivolumab plus ipilimumab and 36 percent with chemotherapy. Treatment-related adverse events leading to discontinuation were more frequent with nivolumab plus ipilimumab versus chemotherapy (18 versus 9 percent, respectively). The most common immunologic adverse events of any grade among those receiving nivolumab plus ipilimumab were skin reactions (34 percent) and endocrine events (24 percent). Treatment-related deaths occurred in eight patients who received nivolumab plus ipilimumab and in six patients who received chemotherapy. \par \par Thus  after but extensive discussion they decided to proceed with nivolumab and ipilimumab therapy instead of single agent Keytruda\par Once again he declined MRI of the brain\par I went over the scheduling of treatment which is Opdivo every 3 weeks and Keytruda every 6 weeks\par They will see me back with week 3 of treatment\par Risk and benefits once again discussed in detail and informed consent was obtained.  I will stop ipilimumab if patient has serious reactions also given the high tumor mutation burden he is likely benefit from this approach as well.\par

## 2022-08-09 NOTE — REVIEW OF SYSTEMS
[Fever] : no fever [Fatigue] : fatigue [Recent Change In Weight] : ~T recent weight change [Negative] : Heme/Lymph

## 2022-08-09 NOTE — HISTORY OF PRESENT ILLNESS
[de-identified] : CC: I have lung cancer\par \par He is here at the request of Kevin Stephens\par \par He is here with his daughter and wife\par \par This is a 67 year old male with history of   CAD/PVD s/p PCI in 2009 x2 stents (f/u with Dr. Bucio), DM, HLD, HTN, COPD, anxiety, and smoker 50 pack years s/p s/p LLE \par femoral to above knee popliteal artery bypass. He quit smoking about 3 weeks ago after a recent admission to [facility] when he presented with weakenss in July 2022 and was found to have on CT chest  PARISH masslike consolidation and patchy left lung opacities with bulky intrathoracic and right supraclavicular lymphadenopathy Suspicious of\par neoplastic process. Moderate left pleural effusion. large pericardial effusion and had a  Pericardiocentesis on 7/1 and Pericardial drain removed 7/3\par Apparently Cytology sample was not sent from the procedure.    Newly decreased EF on 2d echo - 26%   Pulm did Bronchoscopy/EBUS Bx on 7/5.and supraclavicular node biopsy which showed \par \par Poorly differentiated non-small cell carcinoma, with extensive necrosis,\par see note.\par \par Immunohistochemistry results:\par Material:       Cell block 1A\par Population:    Tumor\par Positive:        Pancytokeratin, CK7 (focal), CK20 (focal)\par Negative:      CK5/6, p63, p40, TTF-1, Napsin A, chromogranin,\par synaptophysin, CD56\par These results support the above diagnosis.\par \par Note:  The focally positive CK7 and CK20 immunoprofile is not organ-\par specific regarding tumor origin.  With the history of a large left hilar\par mass and bulky adenopathy, this tumor is most likely a lung primary, but\par tumor origin from other sites cannot be completely excluded.  Clinical\par correlation required.\par See concurrent subcarinal lymph node EBUS-guided FNA report 27-SH-.\par PDL-1 testing and next generation sequencing are pending and addendum\par report will follow.\par \par Poorly differentiated non-small cell carcinoma, with extensive necrosis,\par see note.\par \par Immunohistochemistry results:\par Material:       Cell block 1A\par Population:    Tumor\par Positive:        Pancytokeratin, CK7 (focal), CK20 (focal)\par Negative:      CK5/6, p63, p40, TTF-1, Napsin A, chromogranin,\par synaptophysin, CD56\par These results support the above diagnosis.\par \par Note:  The focally positive CK7 and CK20 immunoprofile is not organ-\par specific regarding tumor origin.  With the history of a large left hilar\par mass and bulky adenopathy, this tumor is most likely a lung primary, but\par tumor origin from other sites cannot be completely excluded.  Clinical\par correlation required.\par See concurrent subcarinal lymph node EBUS-guided FNA report 16-ON-.\par PDL-1 testing and next generation sequencing are pending and addendum\par report will follow.\par \par RESULTS :\par [employer]  - [employer]\par (KEYTRUDA ?) PD-L1 Immunohistochemical Analysis\par \par PD-L1 IMMUNOHISTOCHEMICAL ANALYSIS\par Tumor Proportion Score:  <1% / Negative\par \par He also was noted to have microcytic anemia with high ferritin.\par \par He had a PET/CT scan in regional that showed 18 Jul 2022\par \par Hypermetabolic left upper lobe paramediastinal mass consistent with malignancy.  FDG avid left upper lobe pulmonary nodules, consistent with malignancy/metastatic disease, non-FDG-avid subcentimeter bilateral pulmonary nodules but lesions are below resolution of a PET, intralobular thickening suspicious for lymphangitic carcinomatosis, hypermetabolic supraclavicular and thoracic lymphadenopathy, FDG avid hepatic lobe hypodensity suspicious for hepatic metastases, focal hypermetabolic activity in the right seventh rib and right ilium bone without discrete lesions on CT highly suspicious for osseous metastases\par \par He is here  With his family to discuss options with his family to discuss options\par  [de-identified] : 8/5/2022\par He is here for follow-up with his daughter as well as his wife.  His NGS was negative for  mutation but PD-L1 was noted to be high at 17.3 this is from GEN path.  I did speak to the daughter about treatment options over the phone and explained that given has PD-L1 of less than 0% we could offer him chemoimmunotherapy but they declined chemotherapy and other option includes dual immunotherapy or even possibly Keytruda alone given to mutation burden of 17.3\par \par To had a second opinion and near [facility] although I do not have those records they were offered the same options specifically Opdivo and ipilimumab, I am not aware of Keytruda that was offered as a single agent and then institution.  Regardless they came back to see me and would like to proceed with Opdivo and ipilimumab.  He declined an MRI of the brain.\par \par They also report a repeat echo that showed improvement in his ejection fraction I will obtain that report but he is now also on Enresto.

## 2022-08-19 PROBLEM — Z00.00 ENCOUNTER FOR PREVENTIVE HEALTH EXAMINATION: Status: ACTIVE | Noted: 2017-02-17

## 2022-09-09 NOTE — HISTORY OF PRESENT ILLNESS
[de-identified] : CC: I have lung cancer\par \par He is here at the request of Kevin Stephens\par \par He is here with his daughter and wife\par \par This is a 67 year old male with history of   CAD/PVD s/p PCI in 2009 x2 stents (f/u with Dr. Bucio), DM, HLD, HTN, COPD, anxiety, and smoker 50 pack years s/p s/p LLE \par femoral to above knee popliteal artery bypass. He quit smoking about 3 weeks ago after a recent admission to [facility] when he presented with weakenss in July 2022 and was found to have on CT chest  PARISH masslike consolidation and patchy left lung opacities with bulky intrathoracic and right supraclavicular lymphadenopathy Suspicious of\par neoplastic process. Moderate left pleural effusion. large pericardial effusion and had a  Pericardiocentesis on 7/1 and Pericardial drain removed 7/3\par Apparently Cytology sample was not sent from the procedure.    Newly decreased EF on 2d echo - 26%   Pulm did Bronchoscopy/EBUS Bx on 7/5.and supraclavicular node biopsy which showed \par \par Poorly differentiated non-small cell carcinoma, with extensive necrosis,\par see note.\par \par Immunohistochemistry results:\par Material:       Cell block 1A\par Population:    Tumor\par Positive:        Pancytokeratin, CK7 (focal), CK20 (focal)\par Negative:      CK5/6, p63, p40, TTF-1, Napsin A, chromogranin,\par synaptophysin, CD56\par These results support the above diagnosis.\par \par Note:  The focally positive CK7 and CK20 immunoprofile is not organ-\par specific regarding tumor origin.  With the history of a large left hilar\par mass and bulky adenopathy, this tumor is most likely a lung primary, but\par tumor origin from other sites cannot be completely excluded.  Clinical\par correlation required.\par See concurrent subcarinal lymph node EBUS-guided FNA report 60-CT-.\par PDL-1 testing and next generation sequencing are pending and addendum\par report will follow.\par \par Poorly differentiated non-small cell carcinoma, with extensive necrosis,\par see note.\par \par Immunohistochemistry results:\par Material:       Cell block 1A\par Population:    Tumor\par Positive:        Pancytokeratin, CK7 (focal), CK20 (focal)\par Negative:      CK5/6, p63, p40, TTF-1, Napsin A, chromogranin,\par synaptophysin, CD56\par These results support the above diagnosis.\par \par Note:  The focally positive CK7 and CK20 immunoprofile is not organ-\par specific regarding tumor origin.  With the history of a large left hilar\par mass and bulky adenopathy, this tumor is most likely a lung primary, but\par tumor origin from other sites cannot be completely excluded.  Clinical\par correlation required.\par See concurrent subcarinal lymph node EBUS-guided FNA report 01-XW-.\par PDL-1 testing and next generation sequencing are pending and addendum\par report will follow.\par \par RESULTS :\par [employer]  - [employer]\par (KEYTRUDA ?) PD-L1 Immunohistochemical Analysis\par \par PD-L1 IMMUNOHISTOCHEMICAL ANALYSIS\par Tumor Proportion Score:  <1% / Negative\par \par He also was noted to have microcytic anemia with high ferritin.\par \par He had a PET/CT scan in regional that showed 18 Jul 2022\par \par Hypermetabolic left upper lobe paramediastinal mass consistent with malignancy.  FDG avid left upper lobe pulmonary nodules, consistent with malignancy/metastatic disease, non-FDG-avid subcentimeter bilateral pulmonary nodules but lesions are below resolution of a PET, intralobular thickening suspicious for lymphangitic carcinomatosis, hypermetabolic supraclavicular and thoracic lymphadenopathy, FDG avid hepatic lobe hypodensity suspicious for hepatic metastases, focal hypermetabolic activity in the right seventh rib and right ilium bone without discrete lesions on CT highly suspicious for osseous metastases\par \par He is here  With his family to discuss options with his family to discuss options\par  [de-identified] : 8/5/2022\par He is here for follow-up with his daughter as well as his wife.  His NGS was negative for  mutation but PD-L1 was noted to be high at 17.3 this is from GEN path.  I did speak to the daughter about treatment options over the phone and explained that given has PD-L1 of less than 0% we could offer him chemoimmunotherapy but they declined chemotherapy and other option includes dual immunotherapy or even possibly Keytruda alone given to mutation burden of 17.3\par \par To had a second opinion and near [facility] although I do not have those records they were offered the same options specifically Opdivo and ipilimumab, I am not aware of Keytruda that was offered as a single agent and then institution.  Regardless they came back to see me and would like to proceed with Opdivo and ipilimumab.  He declined an MRI of the brain.\par \par They also report a repeat echo that showed improvement in his ejection fraction I will obtain that report but he is now also on Enresto.\par \par 9/9/2022\par He is here for follow up. He started Ipi and Nivo on 8/19/2022. He is due for Nivo today alone. He has no side effecs. He lost his wife. He takes many supliments  he wishes to undergo IV vitamin C I expledin I have no objecions to vitamin D pills. He lost his voice. CBC today with just mild anemia

## 2022-09-09 NOTE — REVIEW OF SYSTEMS
[Fatigue] : fatigue [Recent Change In Weight] : ~T recent weight change [Negative] : Heme/Lymph [Fever] : no fever [Hoarseness] : hoarseness

## 2022-09-09 NOTE — ASSESSMENT
[FreeTextEntry1] : Impression\par Metastatic poorly differentiated non-small cell lung cancer lung cancer, stage IV with bilateral supraclavicular thoracic adenopathy, possible lymphangitic spread, likely liver and bone metastases and likley malignant pericaridal effusion s/p pericardiocentesis\par -PD-L1 Keytruda less than 1%\par -NGS pending no  mutaion\par -TMB high 17.3 gene path\par -Performance status likely 1-2\par \par Plan\par -We had a discussion discussion again regarding treatment options given that he is declining chemotherapy I explained his options can be a trial of Keytruda given that his tumor mutation burden is 17.3 and recent complication showed dated that he feels well and responds granted that the cutoff that was used was greater than 19 in that study for example another option we spoke about his dual immunotherapy with Opdivo and ipilimumab I explained that dual immunotherapy has higher adverse risks in regards to toxicity compared to single agent immunotherapy such as Keytruda.\par \par We went over the benefits of dual immunotherapy approach, I went over the data from [id number] trial,\par Among 373 patients with tumor PD-L1 expression <1 percent, the median OS with nivolumab plus ipilimumab was 17.2 months, versus 12.2 months with chemotherapy \par  The four-year OS rate with nivolumab plus ipilimumab versus chemotherapy was 24 versus 10 percent in this subset \par Additionally, in those with tumor PD-L1 expression <1 percent, OS was greater with nivolumab plus ipilimumab versus nivolumab plus chemotherapy, which was also evaluated in this study. The percentage of patients with grade 3 or 4 treatment-related adverse events in the overall population (which included patients with [id number]-[id number] as well as WU-T0-[id number] tumors) was 33 percent with nivolumab plus ipilimumab and 36 percent with chemotherapy. Treatment-related adverse events leading to discontinuation were more frequent with nivolumab plus ipilimumab versus chemotherapy (18 versus 9 percent, respectively). The most common immunologic adverse events of any grade among those receiving nivolumab plus ipilimumab were skin reactions (34 percent) and endocrine events (24 percent). Treatment-related deaths occurred in eight patients who received nivolumab plus ipilimumab and in six patients who received chemotherapy. \par \par Thus  after but extensive discussion they decided to proceed with nivolumab and ipilimumab therapy instead of single agent Keytruda\par Once again he declined MRI of the brain\par I went over the scheduling of treatment which is Opdivo every 3 weeks and Keytruda every 6 weeks\par They will see me back with week 3 of treatment\par Risk and benefits once again discussed in detail and informed consent was obtained.  I will stop ipilimumab if patient has serious reactions also given the high tumor mutation burden he is likely benefit from this approach as well.\par \par Plan\par  He started Ipi and Nivo on 8/19/2022. He is due for Nivo today alone and will contineu will check CMP, TSH as well, he will RTC in 3 weeks. Will check CT C/A/P  in Mid  October 2022

## 2022-10-03 NOTE — REVIEW OF SYSTEMS
[Fatigue] : fatigue [Recent Change In Weight] : ~T recent weight change [Hoarseness] : hoarseness [Negative] : Heme/Lymph [Fever] : no fever

## 2022-10-03 NOTE — HISTORY OF PRESENT ILLNESS
[de-identified] : CC: I have lung cancer\par \par He is here at the request of Kevin Stephens\par \par He is here with his daughter and wife\par \par This is a 67 year old male with history of   CAD/PVD s/p PCI in 2009 x2 stents (f/u with Dr. Bucio), DM, HLD, HTN, COPD, anxiety, and smoker 50 pack years s/p s/p LLE \par femoral to above knee popliteal artery bypass. He quit smoking about 3 weeks ago after a recent admission to [facility] when he presented with weakenss in July 2022 and was found to have on CT chest  PARISH masslike consolidation and patchy left lung opacities with bulky intrathoracic and right supraclavicular lymphadenopathy Suspicious of\par neoplastic process. Moderate left pleural effusion. large pericardial effusion and had a  Pericardiocentesis on 7/1 and Pericardial drain removed 7/3\par Apparently Cytology sample was not sent from the procedure.    Newly decreased EF on 2d echo - 26%   Pulm did Bronchoscopy/EBUS Bx on 7/5.and supraclavicular node biopsy which showed \par \par Poorly differentiated non-small cell carcinoma, with extensive necrosis,\par see note.\par \par Immunohistochemistry results:\par Material:       Cell block 1A\par Population:    Tumor\par Positive:        Pancytokeratin, CK7 (focal), CK20 (focal)\par Negative:      CK5/6, p63, p40, TTF-1, Napsin A, chromogranin,\par synaptophysin, CD56\par These results support the above diagnosis.\par \par Note:  The focally positive CK7 and CK20 immunoprofile is not organ-\par specific regarding tumor origin.  With the history of a large left hilar\par mass and bulky adenopathy, this tumor is most likely a lung primary, but\par tumor origin from other sites cannot be completely excluded.  Clinical\par correlation required.\par See concurrent subcarinal lymph node EBUS-guided FNA report 17-NI-.\par PDL-1 testing and next generation sequencing are pending and addendum\par report will follow.\par \par Poorly differentiated non-small cell carcinoma, with extensive necrosis,\par see note.\par \par Immunohistochemistry results:\par Material:       Cell block 1A\par Population:    Tumor\par Positive:        Pancytokeratin, CK7 (focal), CK20 (focal)\par Negative:      CK5/6, p63, p40, TTF-1, Napsin A, chromogranin,\par synaptophysin, CD56\par These results support the above diagnosis.\par \par Note:  The focally positive CK7 and CK20 immunoprofile is not organ-\par specific regarding tumor origin.  With the history of a large left hilar\par mass and bulky adenopathy, this tumor is most likely a lung primary, but\par tumor origin from other sites cannot be completely excluded.  Clinical\par correlation required.\par See concurrent subcarinal lymph node EBUS-guided FNA report [id number]-.\par PDL-1 testing and next generation sequencing are pending and addendum\par report will follow.\par \par RESULTS :\par [employer]  - [employer]\par (KEYTRUDA ?) PD-L1 Immunohistochemical Analysis\par \par PD-L1 IMMUNOHISTOCHEMICAL ANALYSIS\par Tumor Proportion Score:  <1% / Negative\par \par He also was noted to have microcytic anemia with high ferritin.\par \par He had a PET/CT scan in regional that showed 18 Jul 2022\par \par Hypermetabolic left upper lobe paramediastinal mass consistent with malignancy.  FDG avid left upper lobe pulmonary nodules, consistent with malignancy/metastatic disease, non-FDG-avid subcentimeter bilateral pulmonary nodules but lesions are below resolution of a PET, intralobular thickening suspicious for lymphangitic carcinomatosis, hypermetabolic supraclavicular and thoracic lymphadenopathy, FDG avid hepatic lobe hypodensity suspicious for hepatic metastases, focal hypermetabolic activity in the right seventh rib and right ilium bone without discrete lesions on CT highly suspicious for osseous metastases\par \par He is here  With his family to discuss options with his family to discuss options\par  [de-identified] : 8/5/2022\par He is here for follow-up with his daughter as well as his wife.  His NGS was negative for  mutation but PD-L1 was noted to be high at 17.3 this is from GEN path.  I did speak to the daughter about treatment options over the phone and explained that given has PD-L1 of less than 0% we could offer him chemoimmunotherapy but they declined chemotherapy and other option includes dual immunotherapy or even possibly Keytruda alone given to mutation burden of 17.3\par \par To had a second opinion and near [facility] although I do not have those records they were offered the same options specifically Opdivo and ipilimumab, I am not aware of Keytruda that was offered as a single agent and then institution.  Regardless they came back to see me and would like to proceed with Opdivo and ipilimumab.  He declined an MRI of the brain.\par \par They also report a repeat echo that showed improvement in his ejection fraction I will obtain that report but he is now also on Enresto.\par \par 9/9/2022\par He is here for follow up. He started Ipi and Nivo on 8/19/2022. He is due for Nivo today alone. He has no side effecs. He lost his wife. He takes many supliments  he wishes to undergo IV vitamin C I expledin I have no objecions to vitamin D pills. He lost his voice. CBC today with just mild anemia \par \par 10/3/2022\par He is here for follow up. Last visit he had reaction to Opdivo but it resolved with some solucortef and Benadryl.\par He is due for cycle 3 today. He feels geat. has some anxiety and insomnia so Ativan was refilled. Hoarsens is the same.

## 2022-10-03 NOTE — ASSESSMENT
[FreeTextEntry1] : Impression\par Metastatic poorly differentiated non-small cell lung cancer lung cancer, stage IV with bilateral supraclavicular thoracic adenopathy, possible lymphangitic spread, likely liver and bone metastases and likley malignant pericaridal effusion s/p pericardiocentesis\par -PD-L1 Keytruda less than 1%\par -NGS pending no  mutaion\par -TMB high 17.3 gene path\par -Performance status  1 \par \par _he had a reaction to Opdivo with last cycle that resolved with steroids and benadryl and he was able to complete it.\par \par Plan\par  He started Ipi and Nivo on 8/19/2022. He is due for Cycle 2 and will admisnter  Nivo  and Ipi today  with pre meds of solucortef and Benadryl. He had recent blood work 9/30/22 tat are fine\par -will check CT C/A/P in 2 weeks it was ordered\par -RTC in 3 weeks with C2D21

## 2022-10-24 NOTE — HISTORY OF PRESENT ILLNESS
[de-identified] : CC: I have lung cancer\par \par He is here at the request of Kevin Stephens\par \par He is here with his daughter and wife\par \par This is a 67 year old male with history of   CAD/PVD s/p PCI in 2009 x2 stents (f/u with Dr. Bucio), DM, HLD, HTN, COPD, anxiety, and smoker 50 pack years s/p s/p LLE \par femoral to above knee popliteal artery bypass. He quit smoking about 3 weeks ago after a recent admission to [facility] when he presented with weakenss in July 2022 and was found to have on CT chest  PARISH masslike consolidation and patchy left lung opacities with bulky intrathoracic and right supraclavicular lymphadenopathy Suspicious of\par neoplastic process. Moderate left pleural effusion. large pericardial effusion and had a  Pericardiocentesis on 7/1 and Pericardial drain removed 7/3\par Apparently Cytology sample was not sent from the procedure.    Newly decreased EF on 2d echo - 26%   Pulm did Bronchoscopy/EBUS Bx on 7/5.and supraclavicular node biopsy which showed \par \par Poorly differentiated non-small cell carcinoma, with extensive necrosis,\par see note.\par \par Immunohistochemistry results:\par Material:       Cell block 1A\par Population:    Tumor\par Positive:        Pancytokeratin, CK7 (focal), CK20 (focal)\par Negative:      CK5/6, p63, p40, TTF-1, Napsin A, chromogranin,\par synaptophysin, CD56\par These results support the above diagnosis.\par \par Note:  The focally positive CK7 and CK20 immunoprofile is not organ-\par specific regarding tumor origin.  With the history of a large left hilar\par mass and bulky adenopathy, this tumor is most likely a lung primary, but\par tumor origin from other sites cannot be completely excluded.  Clinical\par correlation required.\par See concurrent subcarinal lymph node EBUS-guided FNA report [id number].\par PDL-1 testing and next generation sequencing are pending and addendum\par report will follow.\par \par Poorly differentiated non-small cell carcinoma, with extensive necrosis,\par see note.\par \par Immunohistochemistry results:\par Material:       Cell block 1A\par Population:    Tumor\par Positive:        Pancytokeratin, CK7 (focal), CK20 (focal)\par Negative:      CK5/6, p63, p40, TTF-1, Napsin A, chromogranin,\par synaptophysin, CD56\par These results support the above diagnosis.\par \par Note:  The focally positive CK7 and CK20 immunoprofile is not organ-\par specific regarding tumor origin.  With the history of a large left hilar\par mass and bulky adenopathy, this tumor is most likely a lung primary, but\par tumor origin from other sites cannot be completely excluded.  Clinical\par correlation required.\par See concurrent subcarinal lymph node EBUS-guided FNA report [id number].\par PDL-1 testing and next generation sequencing are pending and addendum\par report will follow.\par \par RESULTS :\par [employer]  - [employer]\par (KEYTRUDA ?) PD-L1 Immunohistochemical Analysis\par \par PD-L1 IMMUNOHISTOCHEMICAL ANALYSIS\par Tumor Proportion Score:  <1% / Negative\par \par He also was noted to have microcytic anemia with high ferritin.\par \par He had a PET/CT scan in regional that showed 18 Jul 2022\par \par Hypermetabolic left upper lobe paramediastinal mass consistent with malignancy.  FDG avid left upper lobe pulmonary nodules, consistent with malignancy/metastatic disease, non-FDG-avid subcentimeter bilateral pulmonary nodules but lesions are below resolution of a PET, intralobular thickening suspicious for lymphangitic carcinomatosis, hypermetabolic supraclavicular and thoracic lymphadenopathy, FDG avid hepatic lobe hypodensity suspicious for hepatic metastases, focal hypermetabolic activity in the right seventh rib and right ilium bone without discrete lesions on CT highly suspicious for osseous metastases\par \par He is here  With his family to discuss options with his family to discuss options\par  [de-identified] : 8/5/2022\par He is here for follow-up with his daughter as well as his wife.  His NGS was negative for  mutation but PD-L1 was noted to be high at 17.3 this is from GEN path.  I did speak to the daughter about treatment options over the phone and explained that given has PD-L1 of less than 0% we could offer him chemoimmunotherapy but they declined chemotherapy and other option includes dual immunotherapy or even possibly Keytruda alone given to mutation burden of 17.3\par \par To had a second opinion and near [facility] although I do not have those records they were offered the same options specifically Opdivo and ipilimumab, I am not aware of Keytruda that was offered as a single agent and then institution.  Regardless they came back to see me and would like to proceed with Opdivo and ipilimumab.  He declined an MRI of the brain.\par \par They also report a repeat echo that showed improvement in his ejection fraction I will obtain that report but he is now also on Enresto.\par \par 9/9/2022\par He is here for follow up. He started Ipi and Nivo on 8/19/2022. He is due for Nivo today alone. He has no side effecs. He lost his wife. He takes many supliments  he wishes to undergo IV vitamin C I expledin I have no objecions to vitamin D pills. He lost his voice. CBC today with just mild anemia \par \par 10/3/2022\par He is here for follow up. Last visit he had reaction to Opdivo but it resolved with some solucortef and Benadryl.\par He is due for cycle 2D1 today. He feels geat. has some anxiety and insomnia so Ativan was refilled. Hoarsens is the same. \par \par 10/24/22\par He is here for follow up for his lung cancer on [facility]el immuno. He is due for cycle 2D21 today \par he had  CT C/A/P done on 10/18/22 that showed overall moderate decrease in  tumor burden, a new 9 mm nodule is seen, new rib fracture with calus formation.  Mild intraabdominal adenopathy, Sclerosis 0.6 cm liver lesion coresponmding to the PET. He feels well. he deenis any pain anymore maybe mild at the rib area.

## 2022-10-24 NOTE — PHYSICAL EXAM
[Restricted in physically strenuous activity but ambulatory and able to carry out work of a light or sedentary nature] : Status 1- Restricted in physically strenuous activity but ambulatory and able to carry out work of a light or sedentary nature, e.g., light house work, office work [Normal] : affect appropriate [de-identified] : good hear sounds

## 2022-10-24 NOTE — ASSESSMENT
[FreeTextEntry1] : Impression\par Metastatic poorly differentiated non-small cell lung cancer lung cancer, stage IV with bilateral supraclavicular thoracic adenopathy, possible lymphangitic spread, likely liver and bone metastases and likley malignant pericaridal effusion s/p pericardiocentesis\par -PD-L1 Keytruda less than 1%\par -NGS pending no  mutaion\par -TMB high 17.3 gene path\par -Performance status  1 \par \par _he had a reaction to Opdivo with last cycle that resolved with steroids and benadryl and he was able to complete it.\par \par Plan\par  He started Ipi and Nivo on 8/19/2022.\par -CT C/A/P from 10/17/22 showing and overall response maybe a mixed response at  most post C2D1\par - He is due for Cycle 221 and will administer  Nivo  and Ipi today  with pre meds of Solu-Cortef and Benadryl. \par -will check CT C/A/P in  about 2 months from now ~12/17/2022\par -CBC, CMP TSH today\par -ativan rffiled for anxiety and insomina today \par -RTC in 3 weeks with treatment

## 2022-11-14 PROBLEM — I31.39 PERICARDIAL EFFUSION: Status: ACTIVE | Noted: 2022-01-01

## 2022-11-14 NOTE — HISTORY OF PRESENT ILLNESS
I reviewed the H&P, I examined the patient, and there are no changes in the patient's condition.     [de-identified] : CC: I have lung cancer\par \par He is here at the request of Kevin Stephens\par \par He is here with his daughter and wife\par \par This is a 67 year old male with history of   CAD/PVD s/p PCI in 2009 x2 stents (f/u with Dr. Bucio), DM, HLD, HTN, COPD, anxiety, and smoker 50 pack years s/p s/p LLE \par femoral to above knee popliteal artery bypass. He quit smoking about 3 weeks ago after a recent admission to [facility] when he presented with weakenss in July 2022 and was found to have on CT chest  PARISH masslike consolidation and patchy left lung opacities with bulky intrathoracic and right supraclavicular lymphadenopathy Suspicious of\par neoplastic process. Moderate left pleural effusion. large pericardial effusion and had a  Pericardiocentesis on 7/1 and Pericardial drain removed 7/3\par Apparently Cytology sample was not sent from the procedure.    Newly decreased EF on 2d echo - 26%   Pulm did Bronchoscopy/EBUS Bx on 7/5.and supraclavicular node biopsy which showed \par \par Poorly differentiated non-small cell carcinoma, with extensive necrosis,\par see note.\par \par Immunohistochemistry results:\par Material:       Cell block 1A\par Population:    Tumor\par Positive:        Pancytokeratin, CK7 (focal), CK20 (focal)\par Negative:      CK5/6, p63, p40, TTF-1, Napsin A, chromogranin,\par synaptophysin, CD56\par These results support the above diagnosis.\par \par Note:  The focally positive CK7 and CK20 immunoprofile is not organ-\par specific regarding tumor origin.  With the history of a large left hilar\par mass and bulky adenopathy, this tumor is most likely a lung primary, but\par tumor origin from other sites cannot be completely excluded.  Clinical\par correlation required.\par See concurrent subcarinal lymph node EBUS-guided FNA report [id number]-.\par PDL-1 testing and next generation sequencing are pending and addendum\par report will follow.\par \par Poorly differentiated non-small cell carcinoma, with extensive necrosis,\par see note.\par \par Immunohistochemistry results:\par Material:       Cell block 1A\par Population:    Tumor\par Positive:        Pancytokeratin, CK7 (focal), CK20 (focal)\par Negative:      CK5/6, p63, p40, TTF-1, Napsin A, chromogranin,\par synaptophysin, CD56\par These results support the above diagnosis.\par \par Note:  The focally positive CK7 and CK20 immunoprofile is not organ-\par specific regarding tumor origin.  With the history of a large left hilar\par mass and bulky adenopathy, this tumor is most likely a lung primary, but\par tumor origin from other sites cannot be completely excluded.  Clinical\par correlation required.\par See concurrent subcarinal lymph node EBUS-guided FNA report [id number]-.\par PDL-1 testing and next generation sequencing are pending and addendum\par report will follow.\par \par RESULTS :\par [employer]  - [employer]\par (KEYTRUDA ?) PD-L1 Immunohistochemical Analysis\par \par PD-L1 IMMUNOHISTOCHEMICAL ANALYSIS\par Tumor Proportion Score:  <1% / Negative\par \par He also was noted to have microcytic anemia with high ferritin.\par \par He had a PET/CT scan in regional that showed 18 Jul 2022\par \par Hypermetabolic left upper lobe paramediastinal mass consistent with malignancy.  FDG avid left upper lobe pulmonary nodules, consistent with malignancy/metastatic disease, non-FDG-avid subcentimeter bilateral pulmonary nodules but lesions are below resolution of a PET, intralobular thickening suspicious for lymphangitic carcinomatosis, hypermetabolic supraclavicular and thoracic lymphadenopathy, FDG avid hepatic lobe hypodensity suspicious for hepatic metastases, focal hypermetabolic activity in the right seventh rib and right ilium bone without discrete lesions on CT highly suspicious for osseous metastases\par \par He is here  With his family to discuss options with his family to discuss options\par  [de-identified] : 8/5/2022\par He is here for follow-up with his daughter as well as his wife.  His NGS was negative for  mutation but PD-L1 was noted to be high at 17.3 this is from GEN path.  I did speak to the daughter about treatment options over the phone and explained that given has PD-L1 of less than 0% we could offer him chemoimmunotherapy but they declined chemotherapy and other option includes dual immunotherapy or even possibly Keytruda alone given to mutation burden of 17.3\par \par To had a second opinion and near [facility] although I do not have those records they were offered the same options specifically Opdivo and ipilimumab, I am not aware of Keytruda that was offered as a single agent and then institution.  Regardless they came back to see me and would like to proceed with Opdivo and ipilimumab.  He declined an MRI of the brain.\par \par They also report a repeat echo that showed improvement in his ejection fraction I will obtain that report but he is now also on Enresto.\par \par 9/9/2022\par He is here for follow up. He started Ipi and Nivo on 8/19/2022. He is due for Nivo today alone. He has no side effecs. He lost his wife. He takes many supliments  he wishes to undergo IV vitamin C I expledin I have no objecions to vitamin D pills. He lost his voice. CBC today with just mild anemia \par \par 10/3/2022\par He is here for follow up. Last visit he had reaction to Opdivo but it resolved with some solucortef and Benadryl.\par He is due for cycle 2D1 today. He feels geat. has some anxiety and insomnia so Ativan was refilled. Hoarsens is the same. \par \par 10/24/22\par He is here for follow up for his lung cancer on duel immuno. He is due for cycle 2D21 today \par he had  CT C/A/P done on 10/18/22 that showed overall moderate decrease in  tumor burden, a new 9 mm nodule is seen, new rib fracture with calus formation.  Mild intraabdominal adenopathy, Sclerosis 0.6 cm liver lesion coresponmding to the PET. He feels well. he deenis any pain anymore maybe mild at the rib area.\par \par 11/14/2022\par He is here for follow up. He is due  for cycle 3 today. He feells well. has no complaints. Hoarsnss is a bit better.

## 2022-11-14 NOTE — PHYSICAL EXAM
[Restricted in physically strenuous activity but ambulatory and able to carry out work of a light or sedentary nature] : Status 1- Restricted in physically strenuous activity but ambulatory and able to carry out work of a light or sedentary nature, e.g., light house work, office work [Normal] : affect appropriate [de-identified] : good hear sounds

## 2022-11-14 NOTE — ASSESSMENT
[FreeTextEntry1] : Impression\par Metastatic poorly differentiated non-small cell lung cancer lung cancer, stage IV with bilateral supraclavicular thoracic adenopathy, possible lymphangitic spread, likely liver and bone metastases and likley malignant pericaridal effusion s/p pericardiocentesis\par -PD-L1 Keytruda less than 1%\par -NGS pending no  mutaion\par -TMB high 17.3 gene path\par -Performance status  1 \par \par _he had a reaction to Opdivo with last cycle that resolved with steroids and benadryl and he was able to complete it.\par \par Plan\par  He started Ipi and Nivo on 8/19/2022.\par -CT C/A/P from 10/17/22 showing and overall response maybe a mixed response at  most post C2D1\par - He is due for Cycle 3D1 and will administer  Nivo  and Ipi today  with pre meds of Solu-Cortef and Benadryl. \par -will check CT C/A/P in  about 2 months from now ~12/17/2022\par -CBC, CMP TSH today\par -ativan rffiled for anxiety and insomina last visit\par -RTC in 3 weeks with treatment and will order CTs than

## 2022-12-05 PROBLEM — E03.8 HYPOTHYROIDISM, SECONDARY: Status: ACTIVE | Noted: 2022-01-01

## 2022-12-28 PROBLEM — Z51.12 ENCOUNTER FOR ANTINEOPLASTIC IMMUNOTHERAPY: Status: ACTIVE | Noted: 2022-01-01

## 2022-12-28 PROBLEM — C34.90 METASTATIC LUNG CANCER (METASTASIS FROM LUNG TO OTHER SITE): Status: ACTIVE | Noted: 2022-01-01

## 2022-12-29 NOTE — HISTORY OF PRESENT ILLNESS
[de-identified] : CC: I have lung cancer\par \par He is here at the request of Kevin Stephens\par \par He is here with his daughter and wife\par \par This is a 67 year old male with history of   CAD/PVD s/p PCI in 2009 x2 stents (f/u with Dr. Bucio), DM, HLD, HTN, COPD, anxiety, and smoker 50 pack years s/p s/p LLE \par femoral to above knee popliteal artery bypass. He quit smoking about 3 weeks ago after a recent admission to [facility] when he presented with weakenss in July 2022 and was found to have on CT chest  PARISH masslike consolidation and patchy left lung opacities with bulky intrathoracic and right supraclavicular lymphadenopathy Suspicious of\par neoplastic process. Moderate left pleural effusion. large pericardial effusion and had a  Pericardiocentesis on 7/1 and Pericardial drain removed 7/3\par Apparently Cytology sample was not sent from the procedure.    Newly decreased EF on 2d echo - 26%   Pulm did Bronchoscopy/EBUS Bx on 7/5.and supraclavicular node biopsy which showed \par \par Poorly differentiated non-small cell carcinoma, with extensive necrosis,\par see note.\par \par Immunohistochemistry results:\par Material:       Cell block 1A\par Population:    Tumor\par Positive:        Pancytokeratin, CK7 (focal), CK20 (focal)\par Negative:      CK5/6, p63, p40, TTF-1, Napsin A, chromogranin,\par synaptophysin, CD56\par These results support the above diagnosis.\par \par Note:  The focally positive CK7 and CK20 immunoprofile is not organ-\par specific regarding tumor origin.  With the history of a large left hilar\par mass and bulky adenopathy, this tumor is most likely a lung primary, but\par tumor origin from other sites cannot be completely excluded.  Clinical\par correlation required.\par See concurrent subcarinal lymph node EBUS-guided FNA report [id number]-.\par PDL-1 testing and next generation sequencing are pending and addendum\par report will follow.\par \par Poorly differentiated non-small cell carcinoma, with extensive necrosis,\par see note.\par \par Immunohistochemistry results:\par Material:       Cell block 1A\par Population:    Tumor\par Positive:        Pancytokeratin, CK7 (focal), CK20 (focal)\par Negative:      CK5/6, p63, p40, TTF-1, Napsin A, chromogranin,\par synaptophysin, CD56\par These results support the above diagnosis.\par \par Note:  The focally positive CK7 and CK20 immunoprofile is not organ-\par specific regarding tumor origin.  With the history of a large left hilar\par mass and bulky adenopathy, this tumor is most likely a lung primary, but\par tumor origin from other sites cannot be completely excluded.  Clinical\par correlation required.\par See concurrent subcarinal lymph node EBUS-guided FNA report [id number]-.\par PDL-1 testing and next generation sequencing are pending and addendum\par report will follow.\par \par RESULTS :\par [employer]  - [employer]\par (KEYTRUDA ?) PD-L1 Immunohistochemical Analysis\par \par PD-L1 IMMUNOHISTOCHEMICAL ANALYSIS\par Tumor Proportion Score:  <1% / Negative\par \par He also was noted to have microcytic anemia with high ferritin.\par \par He had a PET/CT scan in regional that showed 18 Jul 2022\par \par Hypermetabolic left upper lobe paramediastinal mass consistent with malignancy.  FDG avid left upper lobe pulmonary nodules, consistent with malignancy/metastatic disease, non-FDG-avid subcentimeter bilateral pulmonary nodules but lesions are below resolution of a PET, intralobular thickening suspicious for lymphangitic carcinomatosis, hypermetabolic supraclavicular and thoracic lymphadenopathy, FDG avid hepatic lobe hypodensity suspicious for hepatic metastases, focal hypermetabolic activity in the right seventh rib and right ilium bone without discrete lesions on CT highly suspicious for osseous metastases\par \par He is here  With his family to discuss options with his family to discuss options\par  [de-identified] : 8/5/2022\par He is here for follow-up with his daughter as well as his wife.  His NGS was negative for  mutation but PD-L1 was noted to be high at 17.3 this is from GEN path.  I did speak to the daughter about treatment options over the phone and explained that given has PD-L1 of less than 0% we could offer him chemoimmunotherapy but they declined chemotherapy and other option includes dual immunotherapy or even possibly Keytruda alone given to mutation burden of 17.3\par \par To had a second opinion and near [facility] although I do not have those records they were offered the same options specifically Opdivo and ipilimumab, I am not aware of Keytruda that was offered as a single agent and then institution.  Regardless they came back to see me and would like to proceed with Opdivo and ipilimumab.  He declined an MRI of the brain.\par \par They also report a repeat echo that showed improvement in his ejection fraction I will obtain that report but he is now also on Enresto.\par \par 9/9/2022\par He is here for follow up. He started Ipi and Nivo on 8/19/2022. He is due for Nivo today alone. He has no side effecs. He lost his wife. He takes many supliments  he wishes to undergo IV vitamin C I expledin I have no objecions to vitamin D pills. He lost his voice. CBC today with just mild anemia \par \par 10/3/2022\par He is here for follow up. Last visit he had reaction to Opdivo but it resolved with some solucortef and Benadryl.\par He is due for cycle 2D1 today. He feels geat. has some anxiety and insomnia so Ativan was refilled. Hoarsens is the same. \par \par 10/24/22\par He is here for follow up for his lung cancer on duel immuno. He is due for cycle 2D21 today \par he had  CT C/A/P done on 10/18/22 that showed overall moderate decrease in  tumor burden, a new 9 mm nodule is seen, new rib fracture with calus formation.  Mild intraabdominal adenopathy, Sclerosis 0.6 cm liver lesion coresponmding to the PET. He feels well. he deenis any pain anymore maybe mild at the rib area.\par \par 11/14/2022\par He is here for follow up. He is due  for cycle 3 today. He feells well. has no complaints. Hoarsnss is a bit better. \par \par 12/28/2022\par He is here for follow up and due for C4D1 today of ipi and nivo. His voice is now normal. He has no more pain. He feels tired.

## 2022-12-29 NOTE — ASSESSMENT
[FreeTextEntry1] : Impression\par Metastatic poorly differentiated non-small cell lung cancer lung cancer, stage IV with bilateral supraclavicular thoracic adenopathy, possible lymphangitic spread, likely liver and bone metastases and likley malignant pericaridal effusion s/p pericardiocentesis\par -PD-L1 Keytruda less than 1%\par -NGS pending no  mutaion\par -TMB high 17.3 gene path\par -Performance status  1 \par \par _he had a reaction to Opdivo with last cycle that resolved with steroids and benadryl and he was able to complete it.\par \par #Secondary hypothyrodism\par -started Synthroid early Dec 2022\par \par Plan\par  He started Ipi and Nivo on 8/19/2022 and due for C4d1 today of dual drugs .\par -CT C/A/P from 10/17/22 showing and overall response maybe a mixed response at  most post C2D1\par - He is due for Cycle 3D1 and will administer  Nivo  and Ipi today  with pre meds of Solu-Cortef and Benadryl. \par -since doing well will  check CT C/A/P  next month and will order with next visit\par -CBC, CMP TSH today\par -ativan when needed for anxiety, he is no longer taking pain meds\par -c/w synthrooid 75 mcg daily will adjsut based on TSHs\par -RTC in 3 weeks with treatment and will order CTs than

## 2023-01-01 ENCOUNTER — EMERGENCY (EMERGENCY)
Facility: HOSPITAL | Age: 68
LOS: 0 days | End: 2023-01-07
Attending: EMERGENCY MEDICINE | Admitting: EMERGENCY MEDICINE
Payer: MEDICARE

## 2023-01-01 DIAGNOSIS — I10 ESSENTIAL (PRIMARY) HYPERTENSION: ICD-10-CM

## 2023-01-01 DIAGNOSIS — Z95.5 PRESENCE OF CORONARY ANGIOPLASTY IMPLANT AND GRAFT: Chronic | ICD-10-CM

## 2023-01-01 DIAGNOSIS — I46.9 CARDIAC ARREST, CAUSE UNSPECIFIED: ICD-10-CM

## 2023-01-01 DIAGNOSIS — I25.10 ATHEROSCLEROTIC HEART DISEASE OF NATIVE CORONARY ARTERY WITHOUT ANGINA PECTORIS: ICD-10-CM

## 2023-01-01 DIAGNOSIS — E11.9 TYPE 2 DIABETES MELLITUS WITHOUT COMPLICATIONS: ICD-10-CM

## 2023-01-01 DIAGNOSIS — Z79.84 LONG TERM (CURRENT) USE OF ORAL HYPOGLYCEMIC DRUGS: ICD-10-CM

## 2023-01-01 DIAGNOSIS — Z85.118 PERSONAL HISTORY OF OTHER MALIGNANT NEOPLASM OF BRONCHUS AND LUNG: ICD-10-CM

## 2023-01-01 DIAGNOSIS — E78.5 HYPERLIPIDEMIA, UNSPECIFIED: ICD-10-CM

## 2023-01-01 PROCEDURE — 31500 INSERT EMERGENCY AIRWAY: CPT

## 2023-01-01 PROCEDURE — 99285 EMERGENCY DEPT VISIT HI MDM: CPT | Mod: 25

## 2023-01-01 RX ORDER — DEXAMETHASONE 4 MG/1
4 TABLET ORAL DAILY
Qty: 4 | Refills: 0 | Status: ACTIVE | COMMUNITY
Start: 2023-01-01 | End: 1900-01-01

## 2023-01-07 NOTE — ED PROVIDER NOTE - PHYSICAL EXAMINATION
GENERAL: Ill-appearing   SKIN: Cool   CARD: Pulseless   RESP: LCTAB; No wheezes, rales, rhonchi, or stridor.  ABD: Soft and nondistended  NEURO: Unresponsive, in cardiac arrest

## 2023-01-07 NOTE — ED PROVIDER NOTE - OBJECTIVE STATEMENT
68 yo male w/ PMH of lung cancer, DM, HLD, HTN, CAD presents for cardiac arrest.  Family witnessed pt collapse, on EMS arrival CPR started 10 minutes after, 8 rounds of CPR performed prior to arrival, presenting rhythm was PEA with 2 shockable rhythms.

## 2023-01-07 NOTE — ED PROVIDER NOTE - CLINICAL SUMMARY MEDICAL DECISION MAKING FREE TEXT BOX
68 y/o M PMHx Lung cancer, DM, HLD, HTN, CAD presents s/p cardiac arrest.  Family witnessed patient collapse and stop breathing. EMS was called. On EMS arrival, patient in cardiac arrest and CPR started 10 minutes after, 8 rounds of CPR performed prior to arrival, presenting rhythm was PEA with 2 shockable rhythms.    Patient rushed into Critical Care ED with active compressions. ET tube checked after patient did not have breath sounds and tube was displaced in the esophagus. Patient immediately intubated and ACLS continued. FS in field and with EMS done and ok. Patient had down time over one hour and remained asystole in the ED with no bedside cardiac activity. Family spoken to and present in the ED and patient was pronounced .    All questions and concerns from family addressed.

## 2023-01-07 NOTE — ED PROVIDER NOTE - ATTENDING CONTRIBUTION TO CARE
I personally evaluated patient. I agree with the findings and plan with all documentation on chart except as documented  in my note.     68 y/o M PMHx Lung cancer, DM, HLD, HTN, CAD presents s/p cardiac arrest.  Family witnessed patient collapse and stop breathing. EMS was called. On EMS arrival, patient in cardiac arrest and CPR started 10 minutes after, 8 rounds of CPR performed prior to arrival, presenting rhythm was PEA with 2 shockable rhythms.    Patient rushed into Critical Care ED with active compressions. ET tube checked after patient did not have breath sounds and tube was displaced in the esophagus. Patient immediately intubated and ACLS continued. FS in field and with EMS done and ok. Patient had down time over one hour and remained asystole in the ED with no bedside cardiac activity. Family spoken to and present in the ED and patient was pronounced .    All questions and concerns from family addressed.

## 2023-01-07 NOTE — ED ADULT NURSE NOTE - OBJECTIVE STATEMENT
pt BIBEMS in cardiac arrest with CPR in progress. pt found unresponsive at 1310; ems arrived and initiated CPR at 1320; epix3, bicarb x1 and calcium x1 administered in the field; pt intubated in field; pt was in vfib received 2 shocks and arrived to ED in PEA.

## 2023-01-16 ENCOUNTER — APPOINTMENT (OUTPATIENT)
Dept: INFUSION THERAPY | Facility: CLINIC | Age: 68
End: 2023-01-16

## 2023-02-06 ENCOUNTER — APPOINTMENT (OUTPATIENT)
Dept: INFUSION THERAPY | Facility: CLINIC | Age: 68
End: 2023-02-06

## 2023-02-06 ENCOUNTER — APPOINTMENT (OUTPATIENT)
Dept: HEMATOLOGY ONCOLOGY | Facility: CLINIC | Age: 68
End: 2023-02-06

## 2024-04-23 NOTE — ED PROVIDER NOTE - DOES DEATH MEET AT LEAST ONE CRITERION FOR HOSPITAL AUTOPSY?
Held slot to 05/23/2024 10:30 Dr. Cruz, sent msg to pt and advanced  group.  
Responded to portal msg.    
No

## 2024-04-29 NOTE — PRE-ANESTHESIA EVALUATION ADULT - NSANTHINDVINFOSD_GEN_ALL_CORE
acute distress.  Eyes:      Extraocular Movements: Extraocular movements intact.      Pupils: Pupils are equal, round, and reactive to light.   Cardiovascular:      Rate and Rhythm: Normal rate and regular rhythm.   Pulmonary:      Effort: Pulmonary effort is normal. No respiratory distress.      Breath sounds: Normal breath sounds.   Abdominal:      Palpations: Abdomen is soft.      Tenderness: There is no abdominal tenderness.   Musculoskeletal:         General: Tenderness (discomfort with ROM L shoulder) present.      Cervical back: Neck supple.      Right lower leg: No edema.      Left lower leg: No edema.   Neurological:      Mental Status: He is alert and oriented to person, place, and time.      Cranial Nerves: No cranial nerve deficit.      Sensory: No sensory deficit.   Psychiatric:         Mood and Affect: Mood normal.                An electronic signature was used to authenticate this note.    --Avelina Mahan,      This dictation was generated by voice recognition computer software.  Although all attempts are made to edit the dictation for accuracy, there may be errors in the transcription that are not intended.   patient

## 2024-06-03 NOTE — PRE-ANESTHESIA EVALUATION ADULT - NSANTHAPLANRD_GEN_ALL_CORE
MEDICARE WELLNESS VISIT NOTE    HISTORY OF PRESENT ILLNESS:   Shahid presents for his Subsequent Annual Medicare Wellness Visit.   He has no current complaints or concerns.        Denies any further CP, SOB, dizziness since the event several months ago.     Patient Care Team:  Isma Rhoades DO as PCP - General (Family Medicine)  Dayron Figueroa MD as General Surgeon (General Surgery)        Patient Active Problem List   Diagnosis    Essential hypertension, benign    Gastroesophageal reflux disease without esophagitis    Generalized osteoarthrosis, unspecified site    CATRACHITA (obstructive sleep apnea)    Benign localized hyperplasia of prostate without urinary obstruction and other lower urinary tract symptoms (LUTS)    Unspecified vitamin D deficiency    Coronary artery disease-nonstenotic three-vessel disease based on cardiac catheterization in 2003. Medically managed. Negative Cardiolite study in May of 2013    Type 2 diabetes mellitus with diabetic nephropathy, with long-term current use of insulin  (CMD)    Mixed hyperlipidemia    History of colon polyps    Class 2 severe obesity due to excess calories with serious comorbidity and body mass index (BMI) of 36.0 to 36.9 in adult  (CMD)    Stage 3 chronic kidney disease  (CMD)    Obesity (BMI 30-39.9)    Hypertension complicating diabetes  (CMD)    DM type 2 with diabetic mixed hyperlipidemia  (CMD)    Chest pain/palpitations    1st degree AV block    New onset atrial flutter  (CMD)    Anticoagulated with Eliquis    Sinus pause    Atrial fibrillation  (CMD)    Uses self-applied continuous glucose monitoring device         Past Medical History:   Diagnosis Date    AF (atrial fibrillation)  (CMD)     Atrial fibrillation with RVR  (CMD) 5/25/2022    Atrial flutter  (CMD) 05/04/2022    New onset by Jerardo Kinney    BPH (benign prostatic hyperplasia)     CAD (coronary artery disease)     Chronic kidney disease     GERD (gastroesophageal reflux disease)     OA  (osteoarthritis)     knees, back    Obesity, unspecified     CATRACHITA on CPAP     Other and unspecified hyperlipidemia     Type II or unspecified type diabetes mellitus without mention of complication, not stated as uncontrolled     Unspecified essential hypertension          Past Surgical History:   Procedure Laterality Date    Appendectomy      Colonoscopy  2012    neg, f/u due 2017    Colonoscopy  2008    Colonoscopy  2017    repeat colon in 5 yrs, phxp    Hemorhoidectomy int ext single column group      Hemorrhoidectomy    Laparoscopy, cholecystectomy  2006    Cholecystectomy, laparoscopic    Left heart cath,percutaneous  2003    Cardiac Cath    Open access colonoscopy  2022    tubular adenoma repeat in 5 years         Social History     Tobacco Use    Smoking status: Former     Current packs/day: 0.00     Average packs/day: 2.0 packs/day for 22.0 years (44.0 ttl pk-yrs)     Types: Cigarettes     Start date: 1962     Quit date: 1984     Years since quittin.4    Smokeless tobacco: Never   Vaping Use    Vaping status: never used   Substance Use Topics    Alcohol use: Yes     Comment: Every other week    Drug use: No     Drug use:    Drug Use:    No              Family History - Reviewed     Current Outpatient Medications   Medication Sig Dispense Refill    pantoprazole (PROTONIX) 40 MG tablet TAKE 1 TABLET BY MOUTH TWICE A DAY; IN THE MORNING AND EVENING. 180 tablet 1    metoPROLOL tartrate (LOPRESSOR) 50 MG tablet TAKE 1 TABLET BY MOUTH DAILY IN THE MORNING AND TAKE ONE TABLET BY MOUTH ONCE NIGHTLY 180 tablet 1    insulin regular human, CONCENTRATED, (HumuLIN R U-500 KwikPen) 500 UNIT/ML pen-injector Inject 90 units am and 70 units pm 30 minutes before meals, plus 5 units per 50 over 200 mg/dL. TDD up to 200 units per day. 12 mL 11    empagliflozin (Jardiance) 10 MG tablet Take 1 tablet by mouth daily (before breakfast). 30 tablet 3    allopurinol (ZYLOPRIM) 300 MG  tablet Take 1 tablet by mouth daily. 90 tablet 3    atorvastatin (LIPITOR) 20 MG tablet TAKE ONE TABLET BY MOUTH DAILY 90 tablet 1    dulaglutide (Trulicity) 1.5 MG/0.5ML pen-injector Inject 1 pen under the skin once weekly. 6 mL 0    tamsulosin (FLOMAX) 0.4 MG Cap TAKE 1 CAPSULE BY MOUTH DAILY AFTER A MEAL 30 capsule 3    apixaBAN (Eliquis) 5 MG Tab Take 1 tablet by mouth every 12 hours. 60 tablet 3    hydroCHLOROthiazide 25 MG tablet Take 1 tablet by mouth daily. 90 tablet 1    triamcinolone (ARISTOCORT) 0.1 % ointment Apply topically 2 times daily as needed (up to 1 week AFTER 5-fluorouracil application to the scalp). 30 g 3    Glucagon (Baqsimi Two Pack) 3 MG/DOSE Powder Call 911.  Place 1 spray in 1 nostril.  If no response in 15 minutes, place 1 more spray in nostril with new device. 1 each 3    hydrOXYzine (ATARAX) 25 MG tablet Take 1 tablet by mouth every 8 hours as needed for Itching. 60 tablet 1    nitroGLYCERIN (NITROSTAT) 0.4 MG sublingual tablet DISSOLVE 1 TAB UNDER TONGUE FOR CHEST PAIN - IF PAIN REMAINS AFTER 5 MIN, CALL 911 AND REPEAT DOSE. MAX 3 TABS IN 15 MINUTES 25 tablet 0    hydroCORTisone (CORTIZONE) 1 % cream Apply topically 3 times daily. 120 g 0    cetirizine (ZyrTEC) 10 MG tablet Take 1 tablet by mouth daily as needed for Rhinitis (itching). 30 tablet 0    enalapril (VASOTEC) 5 MG tablet Take 1 tablet by mouth daily. 90 tablet 2    OneTouch Ultra test strip USE TO TEST BLOOD SUGARS 1-3 TIMES DAILY. 300 strip 3    docusate sodium (COLACE) 100 MG capsule Take 1 capsule by mouth 2 times daily as needed for Constipation (constipation). 60 capsule 1    Insulin Pen Needle (Pen Needles) 31G X 6 MM Misc 1 each 4 times daily. Use new pen needle for each injection 4x/day. 400 each 3    acetaminophen (TYLENOL) 500 MG tablet Take 500 mg by mouth every 6 hours as needed for Pain.      dimenhyDRINATE (DRAMAMINE PO) Take 2 tablets by mouth daily as needed (vertigo).       Cholecalciferol (VITAMIN D) 2000  UNITS tablet Take 2,000 Units by mouth daily.      Coenzyme Q10 (COQ-10 PO) Take 1 tablet by mouth daily.      Omega 3 1000 MG capsule Take 1,000 mg by mouth daily.        No current facility-administered medications for this visit.        The following items on the Medicare Health Risk Assessment were found to be positive  1.) Do you have an Advance directive, living will, or power of  for health care document that contains your wishes for end of life care?: No     5.) Do you do moderate to strenuous exercise (brisk walk) for about 20 minutes for 3 or more days per week?: No, I usually do not exercise this much     6 a.) How many servings of Fruits and Vegetables do you have each day ( 1 serving = 1 piece of fruit, 1/2 cup fruits or vegetables): None     6 b.) How many servings of High Fiber / Whole Grain Foods to you have each day ( 1 serving = 1 cup cold cereal, 1/2 cup cooked cereal, 1 slice bread): 1 per day     7a.) Have you had a fall in the past year?: Yes     7b.) Do you feel unsteady when standing or walking?: Yes     11b.) Bowel control problems: Sometimes     11l.) Sexual Problems: Always         Vision and Hearing screens: Not performed    Advance care planning documents on file - no     Cognitive/Functional Status: no evidence of cognitive dysfunction by direct observation    Opioid Review: Shahid is not taking opioid medications.    Recent PHQ 2/9 Score:    PHQ 2:  PHQ 2 Score Adult PHQ 2 Score Adult PHQ 2 Interpretation Little interest or pleasure in activity?   6/2/2024  12:07 PM 0 No further screening needed 0       PHQ 9:  PHQ 9 Score Adult PHQ 9 Score Adult PHQ 9 Interpretation   4/1/2022  10:01 AM 2 Minimal Depression       DEPRESSION ASSESSMENT/PLAN:  Will discuss with PCP if needed      There is no height or weight on file to calculate BMI.    BMI FOLLOW-UP/PLAN:  Patient is obese.    Lawn work       Needed Screening/Treatment:   Immunizations reviewed and patient needs: COVID-19 and  TDAP  Needed follow up:  None    See orders.   See Patient Instructions section.   Return in about 1 year (around 6/3/2025) for MWE.      Health Maintenance       COVID-19 Vaccine (5 - 2023-24 season)  Overdue since 9/1/2023    DTaP/Tdap/Td Vaccine (2 - Td or Tdap)  Overdue since 11/25/2023    Medicare Advantage- Medicare Wellness Visit (Yearly - January to December)  Overdue since 1/1/2024             Visit Vitals  /74   Pulse 66   Ht 5' 10\" (1.778 m)   Wt 116.6 kg (257 lb)   BMI 36.88 kg/m²   ;  HEENT:  Pupils equal, round, and reactive to light and accommodation.  Extraocular muscles intact.  Oral:  Within normal limits.  TMs and external auditory canals normal.  NECK:  Supple without thyromegaly, adenopathy or carotid bruits.  LUNGS:  Clear to auscultation throughout.  Easy respirations.  HEART:  Regular rate and rhythm without murmur.  CHEST:  Axillary and breast examination is within normal limits.  No axillary adenopathy.  ABDOMEN:  Relatively lean, soft and nontender.  No palpable masses or hepatomegaly or splenomegaly.  LOWER EXTREMITIES:  Without edema.  Peripheral pulses are within normal limits.  Capillary refill is normal.  Feet warm and dry.  NEUROLOGICAL:  No focal deficits.  Reflexes normal at the knee and ankle.  Cranial nerves II-XII intact.  SKIN:  light brown, irregular shaped nevus L upper ear.       Following review of the above:  Patient is not proceeding with: COVID-19 and Dtap/Tdap/Td    Note: Refer to final orders and clinician documentation.        Shahid was seen today for medicare wellness visit.      Medicare annual wellness visit, subsequent    Annual physical exam    Hypertension complicating diabetes  (CMD)  - Controlled today. Refill.   -     enalapril (VASOTEC) 5 MG tablet; Take 1 tablet by mouth daily.  -     hydroCHLOROthiazide 25 MG tablet; Take 1 tablet by mouth daily.    DM type 2 with diabetic mixed hyperlipidemia  (CMD)  - Stable, controlled. A1C 6.9. On statin, aceI. No  changes.  Managed by endocrinology.   On humulin R 90 A, 70 PM. On jardiance 10mg, trulicity 1.5mg weekly.   -     atorvastatin (LIPITOR) 20 MG tablet; Take 1 tablet by mouth daily.    Mixed hyperlipidemia  - Refill statin.     Stage 3a chronic kidney disease  (CMD)  Followed closely by nephrology, stable.    Paroxysmal atrial fibrillation  (CMD)  Anticoagulated with Eliquis  - Managed by EP/cardiology. Considering Watchman device.    Benign localized hyperplasia of prostate without urinary obstruction and other lower urinary tract symptoms (LUTS)  -     PSA; Future    Skin Lesion L ear  - Likely benign nevus- pt advised to bring this up with derm at next appt.     Tdap to be done at pharmacy.      general

## 2024-08-16 NOTE — END OF VISIT
Please advise-    OK for letter stating patient is unable to receive pertussis vaccine due to past hx of anaphylactic reaction?   [Time Spent: ___ minutes] : I have spent [unfilled] minutes of time on the encounter.

## 2025-06-16 NOTE — ED ADULT TRIAGE NOTE - CCCP TRG CHIEF CMPLNT
Okay to resume home medications, okay to resume home diet, stay hydrated!  Norco given at 2:22pm, wait 4-6hrs before taking Tylenol OR Norco again, as needed  Toradol given at 1:41pm, wait until 7:41pm before taking Motrin/Ibuprofen, as needed        Post-Surgery Checklist    Follow up care is an important part of a successful surgery.  Your surgeon needs to see that you are healing and recovering well.  Please make sure to call and schedule your first follow-up visit with your surgeon.    Call your surgeon if you have any questions specific to your recovery.  Here is what you can do at home:    Pain Management.  Take your pain medication as directed, before the pain becomes too severe.  It may not work as well, if you wait too long between doses.  Pain medication may upset your stomach.  You can take a small amount of food to help with it.  Ask your healthcare provider of other ways to control pain, which may be heat, ice, or relaxation.    If your healthcare provider prescribes over the counter (OTC) medication, like acetaminophen, then ask how much you should take each day and verify that the medication will not interact with any other prescribed medication.  You can overdose on these medications, especially in combination with other medications.  Drinking alcohol and taking pain medication can cause dizziness and slow your breathing.  It can even be deadly.  Do NOT drink alcohol while taking pain medication.  Constipation. Constipation is a common side effect of pain medicine.  Drinking plenty of fluids and eating fruits and vegetables high in fiber can also help.  Call your healthcare provider before taking any medications such as laxatives or stool softeners to ease the constipation.  Remember to call your surgeon prior to taking any laxatives.  Preventing blood clots and pneumonia.  Blood clots and pneumonia can still occur during the recovery period.  Follow the discharge instructions that you were given by  numbness your surgeon.  Your surgeon may want you to resume regular activity/exercises or wear compression stockings to prevent any blood clots.  Also, make sure to keep up with deep breathing and coughing exercises you learned in the hospital to prevent pneumonia.    Incision care. Follow instructions that you were given by your surgeon or in the hospital regarding when it's safe to shower.  Until then, keep the incision dry.  Watch for signs of infection, which include redness, swelling, drainage from incision, fever (100.4   F or higher), or as directed by your health care provider. (Please see section on Preventing Surgical Site Infections for more information)  Activity.  At your follow-up visit, ask your surgeon when it's safe to return to your regular activities.  Slowly get back to exercise and other activities.    Returning to work.  Going back to work depends on your surgery and your type of job.  You and your surgeon will decide when you can return to work.  Driving.  Do NOT drive until the surgeon tells you that you can.  Do NOT drive or operate any heavy machinery for 24 hours after anesthesia and while you are still taking pain medication because it can make you react more slowly to things.  Do not make important decisions.  For the first 24 hours after anesthesia, do NOT make any important decisions, like signing legal documents.    Get good nutrition.  Healthy eating helps your body heal.  If you had a special diet before surgery, ask your surgeon if you should stay on the same diet.  Nausea. Some people may experience nausea and vomiting after surgery, which may be due to anesthesia, pain medication, or the stress of surgery.  Please remember to not push yourself to eat.  Your body will tell you when to eat and how much.  Follow these suggested tips to help manage nausea:  Start with clear liquids and soup  Advance to semi-solid foods like mashed potatoes, gelatin, and applesauce.    Slowly move to solid  foods, but don't eat fatty, rich, or spicy foods at first.    You can try smaller meals more frequently.  Follow nutrition directions provided by surgeon.  If you have obstructive sleep apnea.  You were given anesthesia and pain medicine during surgery to keep you comfortable and your pain controlled.  After surgery, you may experience more apnea spells or longer apnea spells due to the medication that you were given.  At home:  Keep using the continuous positive airway pressure (CPAP) device when you sleep.  Unless your healthcare provider tells you not to, use it when you sleep, day or night.    Talk with your healthcare provider before taking any pain medicine, muscle relaxants, or sedatives.  Your provider will tell you about the possible dangers of taking these medications.      When to call your healthcare provider    Call your surgeon right away if you have any of the following:    Pain, swelling, and redness in your leg or arm  Increased pain or pain that is not relieved with medications  Feel too sleepy, dizzy, or groggy  Drainage, redness, bleeding, or swelling around the incision  Incision opens up (cover it with a clean dressing or cloth)  Severe nausea or vomiting  Fever of 100.4   F (38  C) or higher  Skin changes, such as a rash, itching, or hives, which may mean an allergic reaction (if any facial swelling or trouble breathing, call 911 right away)      Call 911    Call 911 if you have the following symptoms:    Chest Pain  Trouble Breathing  Fast Heartbeat  Fainting  Heavy or Uncontrolled Bleeding    © 9198-4367 [employer]. [address] [place] [address]. All rights reserved. This information is not intended as a substitute for professional medical care. Always follow your healthcare professional's instructions.  © 5765-9303 [employer]. [address] [place] [address]. All rights reserved. This information is not intended as a substitute for  professional medical care. Always follow your healthcare professional's instructions.         Want to Say “Thank You” to a Nurse?  The [employer] Award® was created in memory of LAUREN Awad by his family to say thank you to nurses who provide an outstanding level of care.    Submit a nomination using any method below.     OR    [email or web address]  Or visit the Resource section   on your [facility] wali